# Patient Record
Sex: MALE | Race: ASIAN | NOT HISPANIC OR LATINO | URBAN - METROPOLITAN AREA
[De-identification: names, ages, dates, MRNs, and addresses within clinical notes are randomized per-mention and may not be internally consistent; named-entity substitution may affect disease eponyms.]

---

## 2021-05-25 ENCOUNTER — INPATIENT (INPATIENT)
Facility: HOSPITAL | Age: 71
LOS: 8 days | Discharge: NURSING, REHAB OR LONG-TERM CARE | DRG: 65 | End: 2021-06-03
Attending: HOSPITALIST | Admitting: FAMILY MEDICINE
Payer: MEDICARE

## 2021-05-25 VITALS
WEIGHT: 134.04 LBS | DIASTOLIC BLOOD PRESSURE: 97 MMHG | RESPIRATION RATE: 16 BRPM | TEMPERATURE: 98 F | OXYGEN SATURATION: 99 % | HEART RATE: 71 BPM | SYSTOLIC BLOOD PRESSURE: 185 MMHG

## 2021-05-25 DIAGNOSIS — Z29.9 ENCOUNTER FOR PROPHYLACTIC MEASURES, UNSPECIFIED: ICD-10-CM

## 2021-05-25 DIAGNOSIS — F17.210 NICOTINE DEPENDENCE, CIGARETTES, UNCOMPLICATED: ICD-10-CM

## 2021-05-25 DIAGNOSIS — I63.9 CEREBRAL INFARCTION, UNSPECIFIED: ICD-10-CM

## 2021-05-25 DIAGNOSIS — Z87.09 PERSONAL HISTORY OF OTHER DISEASES OF THE RESPIRATORY SYSTEM: ICD-10-CM

## 2021-05-25 DIAGNOSIS — R73.03 PREDIABETES: ICD-10-CM

## 2021-05-25 LAB
ALBUMIN SERPL ELPH-MCNC: 3.9 G/DL — SIGNIFICANT CHANGE UP (ref 3.3–5)
ALP SERPL-CCNC: 76 U/L — SIGNIFICANT CHANGE UP (ref 40–120)
ALT FLD-CCNC: 27 U/L — SIGNIFICANT CHANGE UP (ref 12–78)
ANION GAP SERPL CALC-SCNC: 7 MMOL/L — SIGNIFICANT CHANGE UP (ref 5–17)
APTT BLD: 29.9 SEC — SIGNIFICANT CHANGE UP (ref 27.5–35.5)
AST SERPL-CCNC: 20 U/L — SIGNIFICANT CHANGE UP (ref 15–37)
BASOPHILS # BLD AUTO: 0.04 K/UL — SIGNIFICANT CHANGE UP (ref 0–0.2)
BASOPHILS NFR BLD AUTO: 0.5 % — SIGNIFICANT CHANGE UP (ref 0–2)
BILIRUB SERPL-MCNC: 0.3 MG/DL — SIGNIFICANT CHANGE UP (ref 0.2–1.2)
BLD GP AB SCN SERPL QL: SIGNIFICANT CHANGE UP
BUN SERPL-MCNC: 12 MG/DL — SIGNIFICANT CHANGE UP (ref 7–23)
CALCIUM SERPL-MCNC: 8.5 MG/DL — SIGNIFICANT CHANGE UP (ref 8.5–10.1)
CHLORIDE SERPL-SCNC: 110 MMOL/L — HIGH (ref 96–108)
CK MB CFR SERPL CALC: <1 NG/ML — SIGNIFICANT CHANGE UP (ref 0–3.6)
CO2 SERPL-SCNC: 25 MMOL/L — SIGNIFICANT CHANGE UP (ref 22–31)
CREAT SERPL-MCNC: 0.83 MG/DL — SIGNIFICANT CHANGE UP (ref 0.5–1.3)
EOSINOPHIL # BLD AUTO: 0.16 K/UL — SIGNIFICANT CHANGE UP (ref 0–0.5)
EOSINOPHIL NFR BLD AUTO: 2 % — SIGNIFICANT CHANGE UP (ref 0–6)
GLUCOSE SERPL-MCNC: 100 MG/DL — HIGH (ref 70–99)
HCT VFR BLD CALC: 48.3 % — SIGNIFICANT CHANGE UP (ref 39–50)
HGB BLD-MCNC: 15.6 G/DL — SIGNIFICANT CHANGE UP (ref 13–17)
IMM GRANULOCYTES NFR BLD AUTO: 0.5 % — SIGNIFICANT CHANGE UP (ref 0–1.5)
INR BLD: 0.88 RATIO — SIGNIFICANT CHANGE UP (ref 0.88–1.16)
LYMPHOCYTES # BLD AUTO: 3.11 K/UL — SIGNIFICANT CHANGE UP (ref 1–3.3)
LYMPHOCYTES # BLD AUTO: 37.9 % — SIGNIFICANT CHANGE UP (ref 13–44)
MCHC RBC-ENTMCNC: 27.1 PG — SIGNIFICANT CHANGE UP (ref 27–34)
MCHC RBC-ENTMCNC: 32.3 GM/DL — SIGNIFICANT CHANGE UP (ref 32–36)
MCV RBC AUTO: 84 FL — SIGNIFICANT CHANGE UP (ref 80–100)
MONOCYTES # BLD AUTO: 1.09 K/UL — HIGH (ref 0–0.9)
MONOCYTES NFR BLD AUTO: 13.3 % — SIGNIFICANT CHANGE UP (ref 2–14)
NEUTROPHILS # BLD AUTO: 3.76 K/UL — SIGNIFICANT CHANGE UP (ref 1.8–7.4)
NEUTROPHILS NFR BLD AUTO: 45.8 % — SIGNIFICANT CHANGE UP (ref 43–77)
NRBC # BLD: 0 /100 WBCS — SIGNIFICANT CHANGE UP (ref 0–0)
PLATELET # BLD AUTO: 346 K/UL — SIGNIFICANT CHANGE UP (ref 150–400)
POTASSIUM SERPL-MCNC: 3.7 MMOL/L — SIGNIFICANT CHANGE UP (ref 3.5–5.3)
POTASSIUM SERPL-SCNC: 3.7 MMOL/L — SIGNIFICANT CHANGE UP (ref 3.5–5.3)
PROT SERPL-MCNC: 7.5 G/DL — SIGNIFICANT CHANGE UP (ref 6–8.3)
PROTHROM AB SERPL-ACNC: 10.4 SEC — LOW (ref 10.6–13.6)
RBC # BLD: 5.75 M/UL — SIGNIFICANT CHANGE UP (ref 4.2–5.8)
RBC # FLD: 13.8 % — SIGNIFICANT CHANGE UP (ref 10.3–14.5)
SARS-COV-2 RNA SPEC QL NAA+PROBE: SIGNIFICANT CHANGE UP
SODIUM SERPL-SCNC: 142 MMOL/L — SIGNIFICANT CHANGE UP (ref 135–145)
TROPONIN I SERPL-MCNC: <.015 NG/ML — SIGNIFICANT CHANGE UP (ref 0.01–0.04)
WBC # BLD: 8.2 K/UL — SIGNIFICANT CHANGE UP (ref 3.8–10.5)
WBC # FLD AUTO: 8.2 K/UL — SIGNIFICANT CHANGE UP (ref 3.8–10.5)

## 2021-05-25 PROCEDURE — 99223 1ST HOSP IP/OBS HIGH 75: CPT | Mod: GC

## 2021-05-25 PROCEDURE — 71045 X-RAY EXAM CHEST 1 VIEW: CPT | Mod: 26

## 2021-05-25 PROCEDURE — 70450 CT HEAD/BRAIN W/O DYE: CPT | Mod: 26,MA,59

## 2021-05-25 PROCEDURE — 70496 CT ANGIOGRAPHY HEAD: CPT | Mod: 26,MA

## 2021-05-25 PROCEDURE — 99285 EMERGENCY DEPT VISIT HI MDM: CPT

## 2021-05-25 PROCEDURE — 70498 CT ANGIOGRAPHY NECK: CPT | Mod: 26,MA

## 2021-05-25 PROCEDURE — 0042T: CPT

## 2021-05-25 RX ORDER — SODIUM CHLORIDE 9 MG/ML
1000 INJECTION, SOLUTION INTRAVENOUS
Refills: 0 | Status: DISCONTINUED | OUTPATIENT
Start: 2021-05-25 | End: 2021-05-25

## 2021-05-25 RX ORDER — ENOXAPARIN SODIUM 100 MG/ML
40 INJECTION SUBCUTANEOUS DAILY
Refills: 0 | Status: DISCONTINUED | OUTPATIENT
Start: 2021-05-25 | End: 2021-06-03

## 2021-05-25 RX ORDER — ASPIRIN/CALCIUM CARB/MAGNESIUM 324 MG
325 TABLET ORAL ONCE
Refills: 0 | Status: COMPLETED | OUTPATIENT
Start: 2021-05-25 | End: 2021-05-25

## 2021-05-25 RX ORDER — ALBUTEROL 90 UG/1
2 AEROSOL, METERED ORAL EVERY 6 HOURS
Refills: 0 | Status: DISCONTINUED | OUTPATIENT
Start: 2021-05-25 | End: 2021-06-03

## 2021-05-25 RX ORDER — NICOTINE POLACRILEX 2 MG
1 GUM BUCCAL DAILY
Refills: 0 | Status: DISCONTINUED | OUTPATIENT
Start: 2021-05-25 | End: 2021-06-03

## 2021-05-25 RX ORDER — ASPIRIN/CALCIUM CARB/MAGNESIUM 324 MG
300 TABLET ORAL DAILY
Refills: 0 | Status: DISCONTINUED | OUTPATIENT
Start: 2021-05-26 | End: 2021-05-27

## 2021-05-25 RX ORDER — ATORVASTATIN CALCIUM 80 MG/1
80 TABLET, FILM COATED ORAL AT BEDTIME
Refills: 0 | Status: DISCONTINUED | OUTPATIENT
Start: 2021-05-25 | End: 2021-06-03

## 2021-05-25 RX ORDER — ATORVASTATIN CALCIUM 80 MG/1
80 TABLET, FILM COATED ORAL AT BEDTIME
Refills: 0 | Status: DISCONTINUED | OUTPATIENT
Start: 2021-05-25 | End: 2021-05-25

## 2021-05-25 RX ORDER — ASPIRIN/CALCIUM CARB/MAGNESIUM 324 MG
300 TABLET ORAL DAILY
Refills: 0 | Status: DISCONTINUED | OUTPATIENT
Start: 2021-05-25 | End: 2021-05-25

## 2021-05-25 RX ADMIN — ATORVASTATIN CALCIUM 80 MILLIGRAM(S): 80 TABLET, FILM COATED ORAL at 21:45

## 2021-05-25 RX ADMIN — Medication 1 PATCH: at 20:35

## 2021-05-25 RX ADMIN — Medication 325 MILLIGRAM(S): at 18:23

## 2021-05-25 RX ADMIN — ENOXAPARIN SODIUM 40 MILLIGRAM(S): 100 INJECTION SUBCUTANEOUS at 20:35

## 2021-05-25 RX ADMIN — SODIUM CHLORIDE 70 MILLILITER(S): 9 INJECTION, SOLUTION INTRAVENOUS at 19:42

## 2021-05-25 NOTE — H&P ADULT - HISTORY OF PRESENT ILLNESS
72 yo M pmhx borderline diabetes, current smoker presenting with left arm and leg weakness that started 11:45 AM when patient was on a boat fishing. He was alone and rowed back to his car. When he got out of the boat, he started to become dizzy, as if he was going to pass out and found that he had difficulty ambulating due to extreme weakness of the left side of his body. The weakness remained until he reached home around 12:00. He laid down in bed and about 30 minutes later, he began to experience the weakness again. He called his PCP, Dr. Mills in [place] who told him he might be having a mini stroke and to go to the ER. He then told his son, John who brought him to the ER. He admits to continued weakness on the left side of his body, improved from before and dizziness upon standing. He also admitted to some difficulty swallowing when trying to take asa 325 in the ED.     Denies facial dropping, numbness/tingling, fall, trauma, loss of consciousness, chest pain, palpitations, shortness of breath.     ED Course:   178/88 HR 79 RR 16 SpO2 96%   Labs: insignificant   Imaging: CT Head/Stoke Protocol: HEAD CT: Mild volume loss, microvascular disease, no acute hemorrhage or midline shift.    CT PERFUSION demonstrated: No core infarct. No active ischemia.  If symptoms persist consider follow up head CT or MRI, MRA  if no contraindication.  CTA COW:  Patent intracranial circulation without flow limiting stenosis.  CTA NECK: Patent, ECAs, ICAs, no  hemodynamically significant stenosis at  ICA origins by NASCET criteria.  Bilateral vertebral arteries are patent without flow limiting stenosis.    Given  X 1  72 yo M pmhx borderline diabetes, current smoker, COPD presenting with left arm and leg weakness that started 11:45 AM when patient was on a boat fishing. He was alone and rowed back to his car. When he got out of the boat, he started to become dizzy, as if he was going to pass out and found that he had difficulty ambulating due to extreme weakness of the left side of his body. The weakness remained until he reached home around 12:00. He laid down in bed and about 30 minutes later, he began to experience the weakness again. He called his PCP, Dr. Mills in [place] who told him he might be having a mini stroke and to go to the ER. He then told his son, John who brought him to the ER. He admits to continued weakness on the left side of his body, improved from before and dizziness upon standing. He also admitted to some difficulty swallowing when trying to take asa 325 in the ED.     Denies facial dropping, numbness/tingling, fall, trauma, loss of consciousness, chest pain, palpitations, shortness of breath.     ED Course:   178/88 HR 79 RR 16 SpO2 96%   Labs: insignificant   Imaging: CT Head/Stoke Protocol: HEAD CT: Mild volume loss, microvascular disease, no acute hemorrhage or midline shift.    CT PERFUSION demonstrated: No core infarct. No active ischemia.  If symptoms persist consider follow up head CT or MRI, MRA  if no contraindication.  CTA COW:  Patent intracranial circulation without flow limiting stenosis.  CTA NECK: Patent, ECAs, ICAs, no  hemodynamically significant stenosis at  ICA origins by NASCET criteria.  Bilateral vertebral arteries are patent without flow limiting stenosis.    Given  X 1  70 yo M pmhx borderline diabetes, current smoker, COPD presenting with left arm and leg weakness that started 11:45 AM when patient was on a boat fishing. He was alone and rowed back to his car. When he got out of the boat, he started to become dizzy, as if he was going to pass out and found that he had difficulty ambulating due to extreme weakness of the left side of his body. The weakness remained until he reached home around 12:00. He laid down in bed and about 30 minutes later, he began to experience the weakness again. He called his PCP, Dr. Mills in [place] who told him he might be having a mini stroke and to go to the ER. He then told his son, John who brought him to the ER. He admits to continued weakness on the left side of his body, improved from before and dizziness upon standing. He also admitted to some difficulty swallowing when trying to take asa 325 in the ED.     Admits to similar episode occurring a few years ago in which he was evaluated in the ER for stroke. He was told he had an imbalance in the ears and given medication for 3 days. Symptoms resolved after the medications.     Denies facial dropping, numbness/tingling, fall, trauma, loss of consciousness, chest pain, palpitations, shortness of breath.     ED Course:   178/88 HR 79 RR 16 SpO2 96%   Labs: insignificant   Imaging: CT Head/Stoke Protocol: HEAD CT: Mild volume loss, microvascular disease, no acute hemorrhage or midline shift.    CT PERFUSION demonstrated: No core infarct. No active ischemia.  If symptoms persist consider follow up head CT or MRI, MRA  if no contraindication.  CTA COW:  Patent intracranial circulation without flow limiting stenosis.  CTA NECK: Patent, ECAs, ICAs, no  hemodynamically significant stenosis at  ICA origins by NASCET criteria.  Bilateral vertebral arteries are patent without flow limiting stenosis.    Given  X 1  70 yo M pmhx borderline diabetes, current smoker, COPD presenting with left arm and leg weakness that started 11:45 AM when patient was on a boat fishing. He was alone and rowed back to his car. When he got out of the boat, he started to become dizzy, as if he was going to pass out and found that he had difficulty ambulating due to extreme weakness of the left side of his body. The weakness remained until he reached home around 12:00. He laid down in bed and about 30 minutes later, he began to experience the weakness again. He called his PCP, Dr. Mills in [place] who told him he might be having a mini stroke and to go to the ER. He then told his son, John who brought him to the ER. He admits to continued weakness on the left side of his body, improved from before and dizziness upon standing. He also admitted to some difficulty swallowing when trying to take asa 325 in the ED.     Admits to similar episode occurring a few years ago in which he was evaluated in the ER for stroke. He was told he had an imbalance in the ears and given medication for 3 days. Symptoms resolved after the medications.     Denies facial dropping, numbness/tingling, fall, trauma, loss of consciousness, chest pain, palpitations, shortness of breath.     ED Course:   178/88 HR 79 RR 16 SpO2 96%   Labs: CK MB negative, troponin negative   Imaging: CT Head/Stoke Protocol: HEAD CT: Mild volume loss, microvascular disease, no acute hemorrhage or midline shift.    CT PERFUSION demonstrated: No core infarct. No active ischemia.  If symptoms persist consider follow up head CT or MRI, MRA  if no contraindication.  CTA COW:  Patent intracranial circulation without flow limiting stenosis.  CTA NECK: Patent, ECAs, ICAs, no  hemodynamically significant stenosis at  ICA origins by NASCET criteria.  Bilateral vertebral arteries are patent without flow limiting stenosis.    Given  X 1  70 yo M pmhx borderline diabetes, current smoker, COPD presenting with left arm and leg weakness that started 11:45 AM when patient was on a boat fishing. He was alone and was able to row back to his car. When he got out of the boat, he started to become dizzy, as if he was going to pass out and found that he had difficulty ambulating due to extreme weakness of the left side of his body. The weakness remained until he reached home around 12:00. He laid down in bed and about 30 minutes later, he began to experience the weakness again. He called his PCP, Dr. Mills in [place] who told him he might be having a mini stroke and to go to the ER. He then told his son, John who brought him to the ER. He admits to continued weakness on the left side of his body, improved from before and dizziness upon standing. He also admitted to some difficulty swallowing when trying to take asa 325 in the ED.     Admits to similar episode occurring a few years ago in which he was evaluated in the ER for stroke. He was told he had an imbalance in the ears and given medication for 3 days. Symptoms resolved after the medications.     Denies facial dropping, numbness/tingling, fall, trauma, loss of consciousness, chest pain, palpitations, shortness of breath.     ED Course:   178/88 HR 79 RR 16 SpO2 96%   Labs: CK MB negative, troponin negative   Imaging: CT Head/Stoke Protocol: HEAD CT: Mild volume loss, microvascular disease, no acute hemorrhage or midline shift.  CT PERFUSION demonstrated: No core infarct. No active ischemia.  If symptoms persist consider follow up head CT or MRI, MRA  if no contraindication.  CTA COW:  Patent intracranial circulation without flow limiting stenosis.  CTA NECK: Patent, ECAs, ICAs, no  hemodynamically significant stenosis at  ICA origins by NASCET criteria.  Bilateral vertebral arteries are patent without flow limiting stenosis.    Given  X 1  72 yo M pmhx borderline diabetes, current smoker, COPD presenting with left arm and leg weakness that started 11:45 AM when patient was on a boat fishing. He was alone and was able to row back to his car. When he got out of the boat, he started to become dizzy, as if he was going to pass out and found that he had difficulty ambulating due to extreme weakness of the left side of his body. The weakness remained until he reached home around 12:00. He laid down in bed and about 30 minutes later, he began to experience the weakness again. He called his PCP, Dr. Mills in [place] who told him he might be having a mini stroke and to go to the ER. He then told his son, John who brought him to the ER. He admits to continued weakness on the left side of his body, improved from before and dizziness upon standing. He also admitted to some difficulty swallowing when trying to take asa 325 in the ED.     Admits to similar episode occurring a few years ago in which he was evaluated in the ER for stroke. He was told he had an imbalance in the ears and given medication for 3 days. Symptoms resolved after the medications.     Denies facial dropping, numbness/tingling, fall, trauma, loss of consciousness, chest pain, palpitations, shortness of breath.     ED Course:   178/88 HR 79 RR 16 SpO2 96%   EKG NSR no ST or T wave changes   Labs: CK MB negative, troponin negative   Imaging: CT Head/Stoke Protocol: HEAD CT: Mild volume loss, microvascular disease, no acute hemorrhage or midline shift.  CT PERFUSION demonstrated: No core infarct. No active ischemia.  If symptoms persist consider follow up head CT or MRI, MRA  if no contraindication.  CTA COW:  Patent intracranial circulation without flow limiting stenosis.  CTA NECK: Patent, ECAs, ICAs, no  hemodynamically significant stenosis at  ICA origins by NASCET criteria.  Bilateral vertebral arteries are patent without flow limiting stenosis.        Given  X 1

## 2021-05-25 NOTE — H&P ADULT - NSHPREVIEWOFSYSTEMS_GEN_ALL_CORE
Constitutional: denies fever, chills, sweating  HEENT: admits to lightheadedness, dizziness   Respiratory: denies SOB, cough, or wheezing  Cardiovascular: denies CP, palpitations  Gastrointestinal: denies nausea, vomiting, diarrhea, constipation, abdominal pain, or bloody stools  Genitourinary: denies painful urination, increased frequency, urgency, or bloody urine  Skin/Breast: denies rashes or itching  Musculoskeletal: denies muscle aches, joint swelling, or muscle weakness  Neurologic: denies loss of sensation, numbness, or tingling  ROS negative except as noted above

## 2021-05-25 NOTE — H&P ADULT - NSHPOUTPATIENTPROVIDERS_GEN_ALL_CORE
Josiane Mills (PCP - [place])   COVID Moderna 4/2021 Josiane Mills (PCP - [place])   COVID Moderna x 2; 4/2021

## 2021-05-25 NOTE — H&P ADULT - PROBLEM SELECTOR PLAN 5
Lovenox 40 mg subQ daily  IMPROVE VTE Individual Risk Assessment          RISK                                                          Points  [  ] Previous VTE                                                3  [  ] Thrombophilia                                             2  [  ] Lower limb paralysis                                   2        (unable to hold up >15 seconds)    [  ] Current Cancer                                             2         (within 6 months)  [  ] Immobilization > 24 hrs                              1  [  ] ICU/CCU stay > 24 hours                             1  [ 1 ] Age > 60                                                         1    IMPROVE VTE Score:         [     1    ]

## 2021-05-25 NOTE — H&P ADULT - PROBLEM SELECTOR PLAN 3
Chantix held due to strict NPO status, may continue once passes bedside screen  - start nicotine patch 14 transdermal daily

## 2021-05-25 NOTE — H&P ADULT - NSHPSOCIALHISTORY_GEN_ALL_CORE
Smokin/2 pack/day since teens; 25 pack year history  ETOH: denies  ADLS: independently  Lives with son  CODE: Full

## 2021-05-25 NOTE — H&P ADULT - ATTENDING COMMENTS
70 yo M pmhx borderline diabetes, current smoker, COPD presenting with left sided weakness and dizziness admitted for CVA. Plan: apprec neuro recs, monitor neuro course, neuro check, s/s eval apprec, monitor clinical course, smoking cessation discussed with admitting team approx 8 min, nicotine patch, pt eval apprec

## 2021-05-25 NOTE — ED PROVIDER NOTE - OBJECTIVE STATEMENT
71 m no PMH here with left leg and arm weakness. started between 11 and 12 this morning while he was fishing. He reports he feels his strength is better now. No other symptoms. no dizziness, headache. He does not take any medications.

## 2021-05-25 NOTE — ED PROVIDER NOTE - NS ED ROS FT
Constitutional: No fever.  Neurological: No headache. + weakness.  Eyes: No vision changes.   Ears, Nose, Mouth, Throat: No congestion.  Cardiovascular: No chest pain.  Respiratory: No difficulty breathing.  Gastrointestinal: No nausea or vomiting.  Genitourinary: No dysuria.  Musculoskeletal: No joint pain.  Integumentary (skin and/or breast): No rash.

## 2021-05-25 NOTE — H&P ADULT - PROBLEM SELECTOR PLAN 1
Patient presenting with left sided weakness, dizziness likely 2/2 to neurological event, CVA   CT Brain Stroke Protocol showing no acute ischemic events, no stenosis   - NIHSS 2, residual weakness left side  - MRI head noncon for AM   - Neuro checks q4  - Strict NPO due to dysphagia pending bedside dysphagia screen; Speech and Swallow Consult  - ASA suppository daily  - PT Evaluation  - Lipid panel/HA1C for AM  - Admit to tele   - Fall Risk precautions, Aspiration Precautions  - NeuroMolly consulted appreciate recs Patient presenting with left sided weakness, dizziness likely 2/2 to neurological event, CVA   CT Brain Stroke Protocol showing no acute ischemic events, no stenosis   - NIHSS 2, residual weakness left side  - MRI head noncon for AM   - Neuro checks q4  - Strict NPO due to dysphagia pending bedside dysphagia screen; Speech and Swallow Consult  - ASA suppository daily; lipitor held due to strict NPO status may start lipitor 80 mg QHS   - PT Evaluation  - Lipid panel/HA1C for AM  - Admit to tele   - Fall Risk precautions, Aspiration Precautions  - NeuroMolly consulted appreciate recs Patient presenting with left sided weakness, dizziness likely 2/2 to neurological event, CVA   CT Brain Stroke Protocol showing no acute ischemic events, no stenosis   - NIHSS 2, residual weakness left side  - MRI head noncon for AM   - Neuro checks q4  - Strict NPO due to dysphagia pending bedside dysphagia screen; Speech and Swallow Consult  - ASA suppository daily; lipitor held due to strict NPO status may start lipitor 80 mg QHS once passes dysphagia screen  - PT Evaluation  - Lipid panel/HA1C for AM  - Admit to tele   - Fall Risk precautions, Aspiration Precautions  - Neuro, Molly consulted appreciate recs

## 2021-05-25 NOTE — ED PROVIDER NOTE - PHYSICAL EXAMINATION
Vital signs as available reviewed.  General:  Comfortable, no acute distress.  Head:  Normocephalic, atraumatic.  Eyes:  Conjunctiva pink, no icterus.  Cardiovascular:  Regular rate, no obvious murmur.  Respiratory:  Clear to auscultation, good air entry bilaterally.  Abdomen:  Soft, non-tender.  Musculoskeletal:  No deformity or calf tenderness.  Neurologic: See stroke tab.  Skin:  Warm and dry.

## 2021-05-25 NOTE — ED ADULT NURSE REASSESSMENT NOTE - NS ED NURSE REASSESS COMMENT FT1
Assumed care of pt from previous RN, Pt ambulated to bathroom with steady gait, resting comfortably on stretcher, IVF infusing, awaiting admission bed, respirations even and unlabored, will continue to monitor closely.

## 2021-05-25 NOTE — ED ADULT NURSE REASSESSMENT NOTE - NS ED NURSE REASSESS COMMENT FT1
Upon completion of CT scan, pt's symptoms improved immensely.  Pt now able to lift and hold left leg and has very minimal drift to left arm.

## 2021-05-25 NOTE — H&P ADULT - ASSESSMENT
72 yo M pmhx borderline diabetes, current smoker, COPD presenting with left sided weakness and dizziness admitted for CVA.

## 2021-05-25 NOTE — H&P ADULT - NSHPPHYSICALEXAM_GEN_ALL_CORE
LOS:     VITALS:   T(C): 36.6 (05-25-21 @ 15:09), Max: 36.6 (05-25-21 @ 15:09)  HR: 70 (05-25-21 @ 18:20) (70 - 79)  BP: 153/75 (05-25-21 @ 18:20) (153/75 - 185/97)  RR: 16 (05-25-21 @ 18:20) (16 - 16)  SpO2: 98% (05-25-21 @ 18:20) (96% - 99%)    GENERAL: NAD, lying in bed comfortably  HEAD:  Atraumatic, Normocephalic  EYES: EOMI, PERRLA, conjunctiva and sclera clear  ENT: Moist mucous membranes  NECK: Supple, No JVD  CHEST/LUNG: Clear to auscultation bilaterally; No rales, rhonchi, wheezing, or rubs. Unlabored respirations  HEART: Regular rate and rhythm; No murmurs, rubs, or gallops  ABDOMEN: BSx4; Soft, nontender, nondistended  EXTREMITIES:  2+ Peripheral Pulses, brisk capillary refill. No clubbing, cyanosis, or edema  NERVOUS SYSTEM:  A&Ox3, + 3/5 strength left upper extremity and left lower extremity, sensation intact, VITALS:   T(C): 36.6 (05-25-21 @ 15:09), Max: 36.6 (05-25-21 @ 15:09)  HR: 70 (05-25-21 @ 18:20) (70 - 79)  BP: 153/75 (05-25-21 @ 18:20) (153/75 - 185/97)  RR: 16 (05-25-21 @ 18:20) (16 - 16)  SpO2: 98% (05-25-21 @ 18:20) (96% - 99%)    GENERAL: NAD, lying in bed comfortably  HEAD:  Atraumatic, Normocephalic  EYES: EOMI, PERRLA, conjunctiva and sclera clear  ENT: Moist mucous membranes  NECK: Supple, No JVD  CHEST/LUNG: Clear to auscultation bilaterally; No rales, rhonchi, wheezing, or rubs. Unlabored respirations  HEART: Regular rate and rhythm; No murmurs, rubs, or gallops  ABDOMEN: BSx4; Soft, nontender, nondistended  EXTREMITIES:  2+ Peripheral Pulses, brisk capillary refill. No clubbing, cyanosis, or edema  NERVOUS SYSTEM:  A&Ox3, + 3/5 strength left upper extremity and left lower extremity, sensation intact,

## 2021-05-25 NOTE — H&P ADULT - NSICDXFAMILYHX_GEN_ALL_CORE_FT
FAMILY HISTORY:  Mother  Still living? No  Family history of cerebrovascular accident (CVA) in mother, Age at diagnosis: Age Unknown    Sibling  Still living? Unknown  Family hx of lung cancer, Age at diagnosis: Age Unknown  FHx: colon cancer, Age at diagnosis: Age Unknown

## 2021-05-25 NOTE — ED ADULT NURSE NOTE - OBJECTIVE STATEMENT
Pt A&Ox4, ambulatory to ED c/o left side weakness.  Pt states he woke up "fine" but then was working outside and had difficulty walking and weakness to his right arm.  Pt states similar symptoms have happened in the past and resolved.  Pt presents with drift to left arm and leg, hand grasp equal.

## 2021-05-25 NOTE — H&P ADULT - NSICDXPASTMEDICALHX_GEN_ALL_CORE_FT
PAST MEDICAL HISTORY:  Borderline diabetic     Chronic obstructive pulmonary disease, unspecified COPD type     Current smoker

## 2021-05-26 LAB
A1C WITH ESTIMATED AVERAGE GLUCOSE RESULT: 6.3 % — HIGH (ref 4–5.6)
A1C WITH ESTIMATED AVERAGE GLUCOSE RESULT: 6.4 % — HIGH (ref 4–5.6)
ALBUMIN SERPL ELPH-MCNC: 3.7 G/DL — SIGNIFICANT CHANGE UP (ref 3.3–5)
ALP SERPL-CCNC: 76 U/L — SIGNIFICANT CHANGE UP (ref 40–120)
ALT FLD-CCNC: 19 U/L — SIGNIFICANT CHANGE UP (ref 12–78)
ANION GAP SERPL CALC-SCNC: 8 MMOL/L — SIGNIFICANT CHANGE UP (ref 5–17)
AST SERPL-CCNC: 25 U/L — SIGNIFICANT CHANGE UP (ref 15–37)
BASOPHILS # BLD AUTO: 0.05 K/UL — SIGNIFICANT CHANGE UP (ref 0–0.2)
BASOPHILS NFR BLD AUTO: 0.5 % — SIGNIFICANT CHANGE UP (ref 0–2)
BILIRUB SERPL-MCNC: 0.5 MG/DL — SIGNIFICANT CHANGE UP (ref 0.2–1.2)
BUN SERPL-MCNC: 8 MG/DL — SIGNIFICANT CHANGE UP (ref 7–23)
CALCIUM SERPL-MCNC: 8.6 MG/DL — SIGNIFICANT CHANGE UP (ref 8.5–10.1)
CHLORIDE SERPL-SCNC: 109 MMOL/L — HIGH (ref 96–108)
CHOLEST SERPL-MCNC: 166 MG/DL — SIGNIFICANT CHANGE UP
CO2 SERPL-SCNC: 25 MMOL/L — SIGNIFICANT CHANGE UP (ref 22–31)
COVID-19 SPIKE DOMAIN AB INTERP: POSITIVE
COVID-19 SPIKE DOMAIN ANTIBODY RESULT: >250 U/ML — HIGH
CREAT SERPL-MCNC: 0.68 MG/DL — SIGNIFICANT CHANGE UP (ref 0.5–1.3)
EOSINOPHIL # BLD AUTO: 0.08 K/UL — SIGNIFICANT CHANGE UP (ref 0–0.5)
EOSINOPHIL NFR BLD AUTO: 0.8 % — SIGNIFICANT CHANGE UP (ref 0–6)
ESTIMATED AVERAGE GLUCOSE: 134 MG/DL — HIGH (ref 68–114)
ESTIMATED AVERAGE GLUCOSE: 137 MG/DL — HIGH (ref 68–114)
GLUCOSE SERPL-MCNC: 102 MG/DL — HIGH (ref 70–99)
HCT VFR BLD CALC: 46.7 % — SIGNIFICANT CHANGE UP (ref 39–50)
HCV AB S/CO SERPL IA: 0.11 S/CO — SIGNIFICANT CHANGE UP (ref 0–0.99)
HCV AB SERPL-IMP: SIGNIFICANT CHANGE UP
HDLC SERPL-MCNC: 50 MG/DL — SIGNIFICANT CHANGE UP
HGB BLD-MCNC: 15.3 G/DL — SIGNIFICANT CHANGE UP (ref 13–17)
IMM GRANULOCYTES NFR BLD AUTO: 0.6 % — SIGNIFICANT CHANGE UP (ref 0–1.5)
LIPID PNL WITH DIRECT LDL SERPL: 87 MG/DL — SIGNIFICANT CHANGE UP
LYMPHOCYTES # BLD AUTO: 2.21 K/UL — SIGNIFICANT CHANGE UP (ref 1–3.3)
LYMPHOCYTES # BLD AUTO: 23 % — SIGNIFICANT CHANGE UP (ref 13–44)
MAGNESIUM SERPL-MCNC: 2.3 MG/DL — SIGNIFICANT CHANGE UP (ref 1.6–2.6)
MCHC RBC-ENTMCNC: 27.5 PG — SIGNIFICANT CHANGE UP (ref 27–34)
MCHC RBC-ENTMCNC: 32.8 GM/DL — SIGNIFICANT CHANGE UP (ref 32–36)
MCV RBC AUTO: 83.8 FL — SIGNIFICANT CHANGE UP (ref 80–100)
MONOCYTES # BLD AUTO: 1.05 K/UL — HIGH (ref 0–0.9)
MONOCYTES NFR BLD AUTO: 10.9 % — SIGNIFICANT CHANGE UP (ref 2–14)
NEUTROPHILS # BLD AUTO: 6.15 K/UL — SIGNIFICANT CHANGE UP (ref 1.8–7.4)
NEUTROPHILS NFR BLD AUTO: 64.2 % — SIGNIFICANT CHANGE UP (ref 43–77)
NON HDL CHOLESTEROL: 117 MG/DL — SIGNIFICANT CHANGE UP
NRBC # BLD: 0 /100 WBCS — SIGNIFICANT CHANGE UP (ref 0–0)
PHOSPHATE SERPL-MCNC: 2.5 MG/DL — SIGNIFICANT CHANGE UP (ref 2.5–4.5)
PLATELET # BLD AUTO: 340 K/UL — SIGNIFICANT CHANGE UP (ref 150–400)
POTASSIUM SERPL-MCNC: 3.9 MMOL/L — SIGNIFICANT CHANGE UP (ref 3.5–5.3)
POTASSIUM SERPL-SCNC: 3.9 MMOL/L — SIGNIFICANT CHANGE UP (ref 3.5–5.3)
PROT SERPL-MCNC: 7.1 G/DL — SIGNIFICANT CHANGE UP (ref 6–8.3)
RBC # BLD: 5.57 M/UL — SIGNIFICANT CHANGE UP (ref 4.2–5.8)
RBC # FLD: 13.8 % — SIGNIFICANT CHANGE UP (ref 10.3–14.5)
SARS-COV-2 IGG+IGM SERPL QL IA: >250 U/ML — HIGH
SARS-COV-2 IGG+IGM SERPL QL IA: POSITIVE
SODIUM SERPL-SCNC: 142 MMOL/L — SIGNIFICANT CHANGE UP (ref 135–145)
TRIGL SERPL-MCNC: 149 MG/DL — SIGNIFICANT CHANGE UP
WBC # BLD: 9.6 K/UL — SIGNIFICANT CHANGE UP (ref 3.8–10.5)
WBC # FLD AUTO: 9.6 K/UL — SIGNIFICANT CHANGE UP (ref 3.8–10.5)

## 2021-05-26 PROCEDURE — 93010 ELECTROCARDIOGRAM REPORT: CPT

## 2021-05-26 PROCEDURE — 99233 SBSQ HOSP IP/OBS HIGH 50: CPT

## 2021-05-26 PROCEDURE — 70551 MRI BRAIN STEM W/O DYE: CPT | Mod: 26

## 2021-05-26 RX ORDER — SODIUM CHLORIDE 9 MG/ML
1000 INJECTION, SOLUTION INTRAVENOUS
Refills: 0 | Status: DISCONTINUED | OUTPATIENT
Start: 2021-05-26 | End: 2021-05-27

## 2021-05-26 RX ORDER — HYDROCORTISONE 1 %
1 OINTMENT (GRAM) TOPICAL
Qty: 0 | Refills: 0 | DISCHARGE

## 2021-05-26 RX ADMIN — Medication 1 PATCH: at 21:05

## 2021-05-26 RX ADMIN — Medication 1 PATCH: at 21:06

## 2021-05-26 RX ADMIN — SODIUM CHLORIDE 50 MILLILITER(S): 9 INJECTION, SOLUTION INTRAVENOUS at 12:27

## 2021-05-26 RX ADMIN — Medication 300 MILLIGRAM(S): at 15:27

## 2021-05-26 RX ADMIN — Medication 1 PATCH: at 15:26

## 2021-05-26 RX ADMIN — Medication 1 PATCH: at 07:25

## 2021-05-26 RX ADMIN — ENOXAPARIN SODIUM 40 MILLIGRAM(S): 100 INJECTION SUBCUTANEOUS at 12:27

## 2021-05-26 NOTE — SWALLOW BEDSIDE ASSESSMENT ADULT - SWALLOW EVAL: DIAGNOSIS
Pt self-administered PO trials of puree, honey thick liquids, and nectar thick liquids. Pt p/w a functional oral phase marked by adequate retrieval and containment, timely manipulation and transfer, and adequate clearance post swallow. Pharyngeal dysphagia marked by suspected delayed swallow onset, +laryngeal elevation to palpation. Pt demonstrated immediate reflexive cough response with honey thick liquids. Pt demonstrated wet vocal quality across all PO. Recommend that oral nutrition/hydration/medication is contraindicated, consider short-term non-oral means. Recommend MBS to determine PO candidacy and utilization of compensatory strategies. Discussed with MD.

## 2021-05-26 NOTE — PHYSICAL THERAPY INITIAL EVALUATION ADULT - PERTINENT HX OF CURRENT PROBLEM, REHAB EVAL
2 yo M pmhx borderline diabetes, current smoker, COPD presenting with left sided weakness and dizziness admitted for CVA.

## 2021-05-26 NOTE — PROGRESS NOTE ADULT - SUBJECTIVE AND OBJECTIVE BOX
Neurology follow up note    [facility] [id number]      Interval History:    Patient feels ok no new complaints.    MEDICATIONS    ALBUTerol    90 MICROgram(s) HFA Inhaler 2 Puff(s) Inhalation every 6 hours PRN  aspirin Suppository 300 milliGRAM(s) Rectal daily  atorvastatin 80 milliGRAM(s) Oral at bedtime  enoxaparin Injectable 40 milliGRAM(s) SubCutaneous daily  nicotine -  14 mG/24Hr(s) Patch 1 patch Transdermal daily  sodium chloride 0.45%. 1000 milliLiter(s) IV Continuous <Continuous>      Allergies    No Known Allergies    Intolerances        Height (cm): 162.6 (05-26 @ 01:09)  Weight (kg): 58.8 (05-26 @ 01:09)  BMI (kg/m2): 22.2 (05-26 @ 01:09)    Vital Signs Last 24 Hrs  T(C): 36.7 (26 May 2021 05:17), Max: 36.7 (26 May 2021 00:30)  T(F): 98.1 (26 May 2021 05:17), Max: 98.1 (26 May 2021 05:17)  HR: 86 (26 May 2021 05:17) (64 - 86)  BP: 148/71 (26 May 2021 05:17) (143/70 - 185/97)  BP(mean): --  RR: 18 (26 May 2021 05:17) (16 - 18)  SpO2: 95% (26 May 2021 05:17) (95% - 99%)      REVIEW OF SYSTEMS:  Constitutional:  The patient denies fever, chills, or night sweats.  Head:  No headaches.  Eyes:  No double vision or blurry vision.  Ears:  No ringing in the ears.  Neck:  No neck pain.  Cardiovascular:  No chest pain.  Respiratory:  No shortness of breath.  Abdomen:  No nausea, vomiting, or abdominal pain.  Extremities/Neurological:  No numbness or tingling.  Musculoskeletal:  No joint pain.      PHYSICAL EXAMINATION:    HEENT:  Head:  Normocephalic, atraumatic.  Eyes:  No scleral icterus.  Ears:  Hearing bilaterally intact.  NECK:  Supple..  CARDIOVASCULAR:  S1 and S2 heard.  RESPIRATORY:  Good air entry bilaterally.  ABDOMEN:  Soft, nontender.  EXTREMITIES:  No clubbing or cyanosis were noted.      NEUROLOGIC:  The patient is awake, alert,   Extraocular movements were intact.  Pupils were equal, round, and reactive bilaterally 3 mm to 2 mm.  Speech was fluent.  Smile was symmetric.  Motor:  Right upper and right lower were 5/5, left upper was able to elevate  bed would fall to bed.  Overall strength was 3/5 bi/triceps hand grasp.   Left lower extremity was 3+/5.  Sensory:  Bilateral upper and lower intact to light touch.            LABS:  CBC Full  -  ( 26 May 2021 07:38 )  WBC Count : 9.60 K/uL  RBC Count : 5.57 M/uL  Hemoglobin : 15.3 g/dL  Hematocrit : 46.7 %  Platelet Count - Automated : 340 K/uL  Mean Cell Volume : 83.8 fl  Mean Cell Hemoglobin : 27.5 pg  Mean Cell Hemoglobin Concentration : 32.8 gm/dL  Auto Neutrophil # : 6.15 K/uL  Auto Lymphocyte # : 2.21 K/uL  Auto Monocyte # : 1.05 K/uL  Auto Eosinophil # : 0.08 K/uL  Auto Basophil # : 0.05 K/uL  Auto Neutrophil % : 64.2 %  Auto Lymphocyte % : 23.0 %  Auto Monocyte % : 10.9 %  Auto Eosinophil % : 0.8 %  Auto Basophil % : 0.5 %      05-26    142  |  109<H>  |  8   ----------------------------<  102<H>  3.9   |  25  |  0.68    Ca    8.6      26 May 2021 07:38  Phos  2.5     05-26  Mg     2.3     05-26    TPro  7.1  /  Alb  3.7  /  TBili  0.5  /  DBili  x   /  AST  25  /  ALT  19  /  AlkPhos  76  05-26    Hemoglobin A1C:     LIVER FUNCTIONS - ( 26 May 2021 07:38 )  Alb: 3.7 g/dL / Pro: 7.1 g/dL / ALK PHOS: 76 U/L / ALT: 19 U/L / AST: 25 U/L / GGT: x           Vitamin B12   PT/INR - ( 25 May 2021 15:48 )   PT: 10.4 sec;   INR: 0.88 ratio         PTT - ( 25 May 2021 15:48 )  PTT:29.9 sec      RADIOLOGY    ANALYSIS AND PLAN:  This is a 71-year-old with left-sided hemiparesis.  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  What could be calculated from the NIH stroke scale would be 4.  The patient would not be a TPA candidate secondary to timeframe of symptoms, and at present etiology of cerebrovascular accident unknown.  Telemetry evaluation.  Echocardiogram.  We will plan for MRI imaging of the brain.  We would recommend to avoid hypotension, allow for permissive hypertensive.  Do not treat a systolic blood pressure unless it is above 200 or diastolic above 105.  We will start the patient on aspirin.  High-dose statin.  Physical therapy evaluation.  S/S evaluation if able to swallow asa 325     Son's name is John, his telephone number is [phone] yesterday today went to voicemail     Greater than 45 minutes of time was spent with the patient, plan of care, reviewing data, with greater than 50% of the visit was spent counseling and/or coordinating care with multidisciplinary healthcare team

## 2021-05-26 NOTE — SWALLOW BEDSIDE ASSESSMENT ADULT - COMMENTS
Pt received upright in bed, awake and cooperative, on room air. Pt was agreeable to assessment. Pt followed simple commands independently. Pt's vocal quality was hoarse, speech production was grossly WFL. Pt denied pain pre and post assessment.    MRI brain: "Small focus of restricted diffusion within the right posterior corona radiata measuring approximately 1.5 x 0.9 cm, compatible with an acute lacunar infarct. No associated mass effect or hemorrhage. Mild periventricular and subcortical white matter chronic microvascular ischemic changes."  CXR 5/25: "Hyperinflated lungs. No active infiltrates." Pt's WBC is WFL, no fever.

## 2021-05-26 NOTE — SWALLOW BEDSIDE ASSESSMENT ADULT - PHARYNGEAL PHASE
Delayed pharyngeal swallow/Wet vocal quality post oral intake Delayed pharyngeal swallow/Wet vocal quality post oral intake/Cough post oral intake

## 2021-05-26 NOTE — PROGRESS NOTE ADULT - SUBJECTIVE AND OBJECTIVE BOX
Saul Marion MD  [phone]    SUBJECTIVE:  Seen with Dr. Barnes.  Interviewed with Cantonese  #[id number].  Resting in bed.  Reports that leg weakness is resolved.  Still with left shoulder weakness.  No dizziness.  No numbness/tingling.  Denies any apparent aspiration of saliva.  No N/V.  No SOB.  NAD.    Tele: NSR, no events reported.    MEDICATIONS  (STANDING):  aspirin Suppository 300 milliGRAM(s) Rectal daily  atorvastatin 80 milliGRAM(s) Oral at bedtime  enoxaparin Injectable 40 milliGRAM(s) SubCutaneous daily  nicotine -  14 mG/24Hr(s) Patch 1 patch Transdermal daily    MEDICATIONS  (PRN):  ALBUTerol    90 MICROgram(s) HFA Inhaler 2 Puff(s) Inhalation every 6 hours PRN Shortness of Breath and/or Wheezing      Vital Signs Last 24 Hrs  T(C): 36.7 (26 May 2021 05:17), Max: 36.7 (26 May 2021 00:30)  T(F): 98.1 (26 May 2021 05:17), Max: 98.1 (26 May 2021 05:17)  HR: 86 (26 May 2021 05:17) (64 - 86)  BP: 148/71 (26 May 2021 05:17) (143/70 - 185/97)  BP(mean): --  RR: 18 (26 May 2021 05:17) (16 - 18)  SpO2: 95% (26 May 2021 05:17) (95% - 99%)    CAPILLARY BLOOD GLUCOSE      POCT Blood Glucose.: 95 mg/dL (25 May 2021 16:43)    I&O's Summary    25 May 2021 07:01  -  26 May 2021 07:00  --------------------------------------------------------  IN: 0 mL / OUT: 600 mL / NET: -600 mL        PHYSICAL EXAM:  GENERAL: Looks stated age, NAD  CARDIOVASCULAR: Normal S1, S2  PULMONARY: Lungs clear to auscultation bilaterally. No wheezes/rales/rhonchi  GI: Abdomen non-distended, soft, Nontender.  Bowel sounds present  MSK/Ext:  No leg edema.  No calf tenderness bilaterally  PSYCH: Normal Affect. AAOx3  NEUROLOGY: CN II-XII grossly intact.  UE and LE sensations grossly intact.  4/5 left hand  strength, 5/5 on the right.  5/5 LE strength b/l.  5/5 left elbow strength.  Left shoulder with 3/5 strength with abduction, 4/5 with adduction.     LABS:                        15.3   9.60  )-----------( 340      ( 26 May 2021 07:38 )             46.7     05-26    142  |  109<H>  |  8   ----------------------------<  102<H>  3.9   |  25  |  0.68    Ca    8.6      26 May 2021 07:38  Phos  2.5     05-26  Mg     2.3     05-26    TPro  7.1  /  Alb  3.7  /  TBili  0.5  /  DBili  x   /  AST  25  /  ALT  19  /  AlkPhos  76  05-26    PT/INR - ( 25 May 2021 15:48 )   PT: 10.4 sec;   INR: 0.88 ratio         PTT - ( 25 May 2021 15:48 )  PTT:29.9 sec  CARDIAC MARKERS ( 25 May 2021 15:48 )  <.015 ng/mL / x     / x     / x     / <1.0 ng/mL            RADIOLOGY & ADDITIONAL TESTS:

## 2021-05-26 NOTE — SWALLOW BEDSIDE ASSESSMENT ADULT - SLP PERTINENT HISTORY OF CURRENT PROBLEM
Per charting, "70 yo M pmhx borderline diabetes, current smoker, COPD presenting with left sided weakness and dizziness admitted for CVA."

## 2021-05-26 NOTE — PHYSICAL THERAPY INITIAL EVALUATION ADULT - ADDITIONAL COMMENTS
Pt reports living in a private house with his wife and son. Pt reports 1 [address], bedroom on ground floor. Pt was independent with ambulation and ADLs PTA. Pt drives. Pt has a tub and states he will be getting a shower chair and grab bars.

## 2021-05-26 NOTE — PROGRESS NOTE ADULT - PROBLEM SELECTOR PLAN 1
Patient presented with left-sided weakness, dizziness.  CT brain negative for CVA and CTA head/neck unremarkable.  Case d/w Dr. Barnes.  Suspect CVA, now with resolution of left LE weakness.  Plan for MRI today.   Neuro checks q4.  Case d/w Speech Pathologist, recommending to continue NPO, MBS in AM.  Will give IVF while NPO.  Continue ASA suppository.  Add Lipitor 80 mg QHS once able to take PO.  PT Evaluation.  A1C 6.3, Lipid panel pending.  Continue tele monitoring.   Fall Risk precautions.

## 2021-05-26 NOTE — PHYSICAL THERAPY INITIAL EVALUATION ADULT - RANGE OF MOTION EXAMINATION, REHAB EVAL
Right UE ROM was WNL (within normal limits)/Right LE ROM was WNL (within normal limits)/Left LE ROM was WFL (within functional limits)

## 2021-05-26 NOTE — SWALLOW BEDSIDE ASSESSMENT ADULT - ASR SWALLOW ASPIRATION MONITOR
change of breathing pattern/oral hygiene/position upright (90Y)/cough/gurgly voice/fever/pneumonia/throat clearing/upper respiratory infection
no

## 2021-05-27 PROCEDURE — 99233 SBSQ HOSP IP/OBS HIGH 50: CPT

## 2021-05-27 PROCEDURE — 74230 X-RAY XM SWLNG FUNCJ C+: CPT | Mod: 26

## 2021-05-27 RX ORDER — ASPIRIN/CALCIUM CARB/MAGNESIUM 324 MG
325 TABLET ORAL DAILY
Refills: 0 | Status: DISCONTINUED | OUTPATIENT
Start: 2021-05-27 | End: 2021-05-28

## 2021-05-27 RX ORDER — SODIUM CHLORIDE 9 MG/ML
1000 INJECTION INTRAMUSCULAR; INTRAVENOUS; SUBCUTANEOUS
Refills: 0 | Status: DISCONTINUED | OUTPATIENT
Start: 2021-05-27 | End: 2021-05-28

## 2021-05-27 RX ADMIN — Medication 1 PATCH: at 11:19

## 2021-05-27 RX ADMIN — ENOXAPARIN SODIUM 40 MILLIGRAM(S): 100 INJECTION SUBCUTANEOUS at 11:19

## 2021-05-27 RX ADMIN — Medication 1 PATCH: at 07:50

## 2021-05-27 RX ADMIN — Medication 300 MILLIGRAM(S): at 11:19

## 2021-05-27 RX ADMIN — Medication 325 MILLIGRAM(S): at 21:24

## 2021-05-27 RX ADMIN — SODIUM CHLORIDE 75 MILLILITER(S): 9 INJECTION INTRAMUSCULAR; INTRAVENOUS; SUBCUTANEOUS at 17:17

## 2021-05-27 RX ADMIN — ATORVASTATIN CALCIUM 80 MILLIGRAM(S): 80 TABLET, FILM COATED ORAL at 21:24

## 2021-05-27 RX ADMIN — Medication 1 PATCH: at 21:24

## 2021-05-27 NOTE — PROGRESS NOTE ADULT - SUBJECTIVE AND OBJECTIVE BOX
Saul Marion MD  [phone]    SUBJECTIVE:  Interviewed with Mandarin  #[id number].  Reports feeling better from yesterday.  Denies any left LE weakness per se but feels that gait was unsteady when walking with PT.  Still with left shoulder and hand weakness.  No SOB on RA.  No N/V.  NAD.    MEDICATIONS  (STANDING):  aspirin enteric coated 325 milliGRAM(s) Oral daily  atorvastatin 80 milliGRAM(s) Oral at bedtime  enoxaparin Injectable 40 milliGRAM(s) SubCutaneous daily  nicotine -  14 mG/24Hr(s) Patch 1 patch Transdermal daily    MEDICATIONS  (PRN):  ALBUTerol    90 MICROgram(s) HFA Inhaler 2 Puff(s) Inhalation every 6 hours PRN Shortness of Breath and/or Wheezing      Vital Signs Last 24 Hrs  T(C): 36.5 (27 May 2021 13:43), Max: 36.7 (26 May 2021 16:15)  T(F): 97.7 (27 May 2021 13:43), Max: 98.1 (26 May 2021 16:15)  HR: 93 (27 May 2021 13:43) (75 - 95)  BP: 131/79 (27 May 2021 13:43) (131/79 - 155/84)  BP(mean): --  RR: 18 (27 May 2021 13:43) (18 - 18)  SpO2: 94% (27 May 2021 13:43) (92% - 95%)    CAPILLARY BLOOD GLUCOSE        I&O's Summary    26 May 2021 07:01  -  27 May 2021 07:00  --------------------------------------------------------  IN: 350 mL / OUT: 500 mL / NET: -150 mL        PHYSICAL EXAM:  GENERAL: Looks stated age, NAD  CARDIOVASCULAR: Normal S1, S2  PULMONARY: Lungs clear to auscultation bilaterally. No wheezes/rales/rhonchi  GI: Abdomen non-distended, soft, Nontender.  Bowel sounds present  MSK/Ext:  No leg edema.  No calf tenderness bilaterally  PSYCH: Normal Affect. AAOx3  NEUROLOGY: CN II-XII grossly intact.  5/5 Right UE/LE, left LE strength.  Left hand  4/5, left elbow 5/5 with flexion/extension.   Left shoulder with 3/5 strength with abduction, 4/5 with adduction.     LABS:                        15.3   9.60  )-----------( 340      ( 26 May 2021 07:38 )             46.7     05-26    142  |  109<H>  |  8   ----------------------------<  102<H>  3.9   |  25  |  0.68    Ca    8.6      26 May 2021 07:38  Phos  2.5     05-26  Mg     2.3     05-26    TPro  7.1  /  Alb  3.7  /  TBili  0.5  /  DBili  x   /  AST  25  /  ALT  19  /  AlkPhos  76  05-26    PT/INR - ( 25 May 2021 15:48 )   PT: 10.4 sec;   INR: 0.88 ratio         PTT - ( 25 May 2021 15:48 )  PTT:29.9 sec  CARDIAC MARKERS ( 25 May 2021 15:48 )  <.015 ng/mL / x     / x     / x     / <1.0 ng/mL            RADIOLOGY & ADDITIONAL TESTS:    < from: MR Head No Cont (05.26.21 @ 11:20) >  FINDINGS: Comparison to CT head of 5/25/2021.    There is a small focus of restricted diffusion within the right posterior corona radiata measuring approximately 1.5 x 0.9 cm, compatible with an acute lacunar infarct. No associated mass effect or hemorrhage. There are scattered foci of FLAIR hyperintensity in the periventricular and subcortical white matter of the bilateral cerebri, compatible with mild chronic microvascular ischemic changes. There is no midline shift or herniation pattern. No mass effect is found in the brain.    The ventricles, sulci and basal cisterns are prominent, compatible with age related generalized cerebral volume loss.    The vertebral and internal carotid arteries demonstrate expected flow voids indicating their patency.    The paranasal sinuses are clear.    IMPRESSION:   Small focus of restricted diffusion within the right posterior corona radiata measuring approximately 1.5 x 0.9 cm, compatible with an acute lacunar infarct. No associated mass effect or hemorrhage. Mild periventricular and subcortical white matter chronic microvascular ischemic changes.    < end of copied text >     Saul Marion MD  [phone]    SUBJECTIVE:  Interviewed with Mandarin  #[id number].  Reports feeling better from yesterday.  Denies any left LE weakness per se but feels that gait was unsteady when walking with PT.  Still with left shoulder and hand weakness.  No SOB on RA.  No N/V.  NAD.    Tele: Sinus rhythm, .  No events reported.    MEDICATIONS  (STANDING):  aspirin enteric coated 325 milliGRAM(s) Oral daily  atorvastatin 80 milliGRAM(s) Oral at bedtime  enoxaparin Injectable 40 milliGRAM(s) SubCutaneous daily  nicotine -  14 mG/24Hr(s) Patch 1 patch Transdermal daily    MEDICATIONS  (PRN):  ALBUTerol    90 MICROgram(s) HFA Inhaler 2 Puff(s) Inhalation every 6 hours PRN Shortness of Breath and/or Wheezing      Vital Signs Last 24 Hrs  T(C): 36.5 (27 May 2021 13:43), Max: 36.7 (26 May 2021 16:15)  T(F): 97.7 (27 May 2021 13:43), Max: 98.1 (26 May 2021 16:15)  HR: 93 (27 May 2021 13:43) (75 - 95)  BP: 131/79 (27 May 2021 13:43) (131/79 - 155/84)  BP(mean): --  RR: 18 (27 May 2021 13:43) (18 - 18)  SpO2: 94% (27 May 2021 13:43) (92% - 95%)    CAPILLARY BLOOD GLUCOSE        I&O's Summary    26 May 2021 07:01  -  27 May 2021 07:00  --------------------------------------------------------  IN: 350 mL / OUT: 500 mL / NET: -150 mL        PHYSICAL EXAM:  GENERAL: Looks stated age, NAD  CARDIOVASCULAR: Normal S1, S2  PULMONARY: Lungs clear to auscultation bilaterally. No wheezes/rales/rhonchi  GI: Abdomen non-distended, soft, Nontender.  Bowel sounds present  MSK/Ext:  No leg edema.  No calf tenderness bilaterally  PSYCH: Normal Affect. AAOx3  NEUROLOGY: CN II-XII grossly intact.  5/5 Right UE/LE, left LE strength.  Left hand  4/5, left elbow 5/5 with flexion/extension.   Left shoulder with 3/5 strength with abduction, 4/5 with adduction.     LABS:                        15.3   9.60  )-----------( 340      ( 26 May 2021 07:38 )             46.7     05-26    142  |  109<H>  |  8   ----------------------------<  102<H>  3.9   |  25  |  0.68    Ca    8.6      26 May 2021 07:38  Phos  2.5     05-26  Mg     2.3     05-26    TPro  7.1  /  Alb  3.7  /  TBili  0.5  /  DBili  x   /  AST  25  /  ALT  19  /  AlkPhos  76  05-26    PT/INR - ( 25 May 2021 15:48 )   PT: 10.4 sec;   INR: 0.88 ratio         PTT - ( 25 May 2021 15:48 )  PTT:29.9 sec  CARDIAC MARKERS ( 25 May 2021 15:48 )  <.015 ng/mL / x     / x     / x     / <1.0 ng/mL            RADIOLOGY & ADDITIONAL TESTS:    < from: MR Head No Cont (05.26.21 @ 11:20) >  FINDINGS: Comparison to CT head of 5/25/2021.    There is a small focus of restricted diffusion within the right posterior corona radiata measuring approximately 1.5 x 0.9 cm, compatible with an acute lacunar infarct. No associated mass effect or hemorrhage. There are scattered foci of FLAIR hyperintensity in the periventricular and subcortical white matter of the bilateral cerebri, compatible with mild chronic microvascular ischemic changes. There is no midline shift or herniation pattern. No mass effect is found in the brain.    The ventricles, sulci and basal cisterns are prominent, compatible with age related generalized cerebral volume loss.    The vertebral and internal carotid arteries demonstrate expected flow voids indicating their patency.    The paranasal sinuses are clear.    IMPRESSION:   Small focus of restricted diffusion within the right posterior corona radiata measuring approximately 1.5 x 0.9 cm, compatible with an acute lacunar infarct. No associated mass effect or hemorrhage. Mild periventricular and subcortical white matter chronic microvascular ischemic changes.    < end of copied text >

## 2021-05-27 NOTE — SWALLOW VFSS/MBS ASSESSMENT ADULT - SLP GENERAL OBSERVATIONS
Pt received sitting upright in MBS chair, +lateral view. Pt was awake and cooperative, on room air. Pt was able to follow commands for purposes of study. Pt denied pain pre and post study.

## 2021-05-27 NOTE — SWALLOW VFSS/MBS ASSESSMENT ADULT - RECOMMENDED CONSISTENCY
1. Soft solids (dysphagia 3) with nectar thick liquids via SINGLE/SMALL CUP SIPS equivalent to teaspoon volume.  2. Pt is at a heightened aspiration risk if consuming larger bolus volume.  3. No straws.  4. Aspiration precautions.  5. Safe swallowing guidelines: position upright 90 degrees, small bite/sip, complete full mastication of solid textures, alternate bite/sip, pace meal slowly, and remain upright 30 minutes post meal.  6. Crushed/whole pills in applesauce.  7. Swallowing therapy s/p d/c to maximize oropharyngeal swallow mechanism.  8. Ongoing oral care. IMPRESSIONS: to mild vallecular and pyriform residue post swallow.  5. Mild pharyngeal dysphagia when given honey thick liquids, puree, and regular solids marked by timely pharyngeal swallow trigger, mildly reduced BOT retraction, mildly reduced hyolaryngeal elevation/excursion, and complete epiglottic retroflexion. There was no penetration and/or aspiration pre/during/post swallow. There was trace BOT and posterior pharyngeal wall and mild valleculae and pyriform residue post swallow.    RECOMMENDATIONS:  1. Soft solids (dysphagia 3) with nectar thick liquids via SINGLE/SMALL CUP SIPS equivalent to teaspoon volume.  2. Pt is at a heightened aspiration risk if consuming larger bolus volume.  3. No straws.  4. Aspiration precautions.  5. Safe swallowing guidelines: position upright 90 degrees, small bite/sip, complete full mastication of solid textures, alternate bite/sip, pace meal slowly, and remain upright 30 minutes post meal.  6. Crushed/whole pills in applesauce.  7. Swallowing therapy s/p d/c to maximize oropharyngeal swallow mechanism.  8. Ongoing oral care.

## 2021-05-27 NOTE — PROGRESS NOTE ADULT - SUBJECTIVE AND OBJECTIVE BOX
Neurology follow up note    FUK [id number]      Interval History:    Patient feels ok no new complaints.    MEDICATIONS    ALBUTerol    90 MICROgram(s) HFA Inhaler 2 Puff(s) Inhalation every 6 hours PRN  aspirin enteric coated 325 milliGRAM(s) Oral daily  atorvastatin 80 milliGRAM(s) Oral at bedtime  enoxaparin Injectable 40 milliGRAM(s) SubCutaneous daily  nicotine -  14 mG/24Hr(s) Patch 1 patch Transdermal daily      Allergies    No Known Allergies    Intolerances            Vital Signs Last 24 Hrs  T(C): 36.5 (27 May 2021 13:43), Max: 36.7 (26 May 2021 16:15)  T(F): 97.7 (27 May 2021 13:43), Max: 98.1 (26 May 2021 16:15)  HR: 93 (27 May 2021 13:43) (75 - 95)  BP: 131/79 (27 May 2021 13:43) (131/79 - 155/84)  BP(mean): --  RR: 18 (27 May 2021 13:43) (18 - 18)  SpO2: 94% (27 May 2021 13:43) (92% - 95%)    REVIEW OF SYSTEMS:  Constitutional:  The patient denies fever, chills, or night sweats.  Head:  No headaches.  Eyes:  No double vision or blurry vision.  Ears:  No ringing in the ears.  Neck:  No neck pain.  Cardiovascular:  No chest pain.  Respiratory:  No shortness of breath.  Abdomen:  No nausea, vomiting, or abdominal pain.  Extremities/Neurological:  No numbness or tingling.  Musculoskeletal:  No joint pain.      PHYSICAL EXAMINATION:    HEENT:  Head:  Normocephalic, atraumatic.  Eyes:  No scleral icterus.  Ears:  Hearing bilaterally intact.  NECK:  Supple..  CARDIOVASCULAR:  S1 and S2 heard.  RESPIRATORY:  Good air entry bilaterally.  ABDOMEN:  Soft, nontender.  EXTREMITIES:  No clubbing or cyanosis were noted.      NEUROLOGIC:  The patient is awake, alert,   Extraocular movements were intact.  Pupils were equal, round, and reactive bilaterally 3 mm to 2 mm.  Speech was slight dysarthria noted today.  Smile was slight left drop.  Motor:  Right upper and right lower were 5/5, left upper was able to elevate  bed would fall to bed.  Overall strength was 3/5 bi/triceps hand grasp.   Left lower extremity was 3+/5.  Sensory:  Bilateral upper and lower intact to light touch.                      LABS:  CBC Full  -  ( 26 May 2021 07:38 )  WBC Count : 9.60 K/uL  RBC Count : 5.57 M/uL  Hemoglobin : 15.3 g/dL  Hematocrit : 46.7 %  Platelet Count - Automated : 340 K/uL  Mean Cell Volume : 83.8 fl  Mean Cell Hemoglobin : 27.5 pg  Mean Cell Hemoglobin Concentration : 32.8 gm/dL  Auto Neutrophil # : 6.15 K/uL  Auto Lymphocyte # : 2.21 K/uL  Auto Monocyte # : 1.05 K/uL  Auto Eosinophil # : 0.08 K/uL  Auto Basophil # : 0.05 K/uL  Auto Neutrophil % : 64.2 %  Auto Lymphocyte % : 23.0 %  Auto Monocyte % : 10.9 %  Auto Eosinophil % : 0.8 %  Auto Basophil % : 0.5 %      05-26    142  |  109<H>  |  8   ----------------------------<  102<H>  3.9   |  25  |  0.68    Ca    8.6      26 May 2021 07:38  Phos  2.5     05-26  Mg     2.3     05-26    TPro  7.1  /  Alb  3.7  /  TBili  0.5  /  DBili  x   /  AST  25  /  ALT  19  /  AlkPhos  76  05-26    Hemoglobin A1C:     LIVER FUNCTIONS - ( 26 May 2021 07:38 )  Alb: 3.7 g/dL / Pro: 7.1 g/dL / ALK PHOS: 76 U/L / ALT: 19 U/L / AST: 25 U/L / GGT: x           Vitamin B12   PT/INR - ( 25 May 2021 15:48 )   PT: 10.4 sec;   INR: 0.88 ratio         PTT - ( 25 May 2021 15:48 )  PTT:29.9 sec      RADIOLOGY    ANALYSIS AND PLAN:  This is a 71-year-old with left-sided hemiparesis.  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  What could be calculated from the NIH stroke scale would be 4.  The patient would not be a TPA candidate secondary to timeframe of symptoms, and at present etiology of cerebrovascular accident unknown.  Telemetry evaluation.  Echocardiogram.  We will plan for MRI imaging of the brain.  We would recommend to avoid hypotension, allow for permissive hypertensive.  Do not treat a systolic blood pressure unless it is above 200 or diastolic above 105.  We will start the patient on aspirin.  High-dose statin.  Physical therapy evaluation.  S/S evaluation if able to swallow asa 325     Son's name is John, his telephone number is [phone] spoke to rafael yesterday     Greater than 45 minutes of time was spent with the patient, plan of care, reviewing data, with greater than 50% of the visit was spent counseling and/or coordinating care with multidisciplinary healthcare team     Neurology follow up note    FUK [id number]      Interval History:    Patient feels ok no new complaints.    MEDICATIONS    ALBUTerol    90 MICROgram(s) HFA Inhaler 2 Puff(s) Inhalation every 6 hours PRN  aspirin enteric coated 325 milliGRAM(s) Oral daily  atorvastatin 80 milliGRAM(s) Oral at bedtime  enoxaparin Injectable 40 milliGRAM(s) SubCutaneous daily  nicotine -  14 mG/24Hr(s) Patch 1 patch Transdermal daily      Allergies    No Known Allergies    Intolerances            Vital Signs Last 24 Hrs  T(C): 36.5 (27 May 2021 13:43), Max: 36.7 (26 May 2021 16:15)  T(F): 97.7 (27 May 2021 13:43), Max: 98.1 (26 May 2021 16:15)  HR: 93 (27 May 2021 13:43) (75 - 95)  BP: 131/79 (27 May 2021 13:43) (131/79 - 155/84)  BP(mean): --  RR: 18 (27 May 2021 13:43) (18 - 18)  SpO2: 94% (27 May 2021 13:43) (92% - 95%)    REVIEW OF SYSTEMS:  Constitutional:  The patient denies fever, chills, or night sweats.  Head:  No headaches.  Eyes:  No double vision or blurry vision.  Ears:  No ringing in the ears.  Neck:  No neck pain.  Cardiovascular:  No chest pain.  Respiratory:  No shortness of breath.  Abdomen:  No nausea, vomiting, or abdominal pain.  Extremities/Neurological:  No numbness or tingling.  Musculoskeletal:  No joint pain.      PHYSICAL EXAMINATION:    HEENT:  Head:  Normocephalic, atraumatic.  Eyes:  No scleral icterus.  Ears:  Hearing bilaterally intact.  NECK:  Supple..  CARDIOVASCULAR:  S1 and S2 heard.  RESPIRATORY:  Good air entry bilaterally.  ABDOMEN:  Soft, nontender.  EXTREMITIES:  No clubbing or cyanosis were noted.      NEUROLOGIC:  The patient is awake, alert,   Extraocular movements were intact.  Pupils were equal, round, and reactive bilaterally 3 mm to 2 mm.  Speech was slight dysarthria noted today.  Smile was slight left drop.  Motor:  Right upper and right lower were 5/5, left upper was able to elevate  bed would fall to bed.  Overall strength was 3/5 bi/triceps hand grasp.   Left lower extremity was 3+/5.  Sensory:  Bilateral upper and lower intact to light touch.                      LABS:  CBC Full  -  ( 26 May 2021 07:38 )  WBC Count : 9.60 K/uL  RBC Count : 5.57 M/uL  Hemoglobin : 15.3 g/dL  Hematocrit : 46.7 %  Platelet Count - Automated : 340 K/uL  Mean Cell Volume : 83.8 fl  Mean Cell Hemoglobin : 27.5 pg  Mean Cell Hemoglobin Concentration : 32.8 gm/dL  Auto Neutrophil # : 6.15 K/uL  Auto Lymphocyte # : 2.21 K/uL  Auto Monocyte # : 1.05 K/uL  Auto Eosinophil # : 0.08 K/uL  Auto Basophil # : 0.05 K/uL  Auto Neutrophil % : 64.2 %  Auto Lymphocyte % : 23.0 %  Auto Monocyte % : 10.9 %  Auto Eosinophil % : 0.8 %  Auto Basophil % : 0.5 %      05-26    142  |  109<H>  |  8   ----------------------------<  102<H>  3.9   |  25  |  0.68    Ca    8.6      26 May 2021 07:38  Phos  2.5     05-26  Mg     2.3     05-26    TPro  7.1  /  Alb  3.7  /  TBili  0.5  /  DBili  x   /  AST  25  /  ALT  19  /  AlkPhos  76  05-26    Hemoglobin A1C:     LIVER FUNCTIONS - ( 26 May 2021 07:38 )  Alb: 3.7 g/dL / Pro: 7.1 g/dL / ALK PHOS: 76 U/L / ALT: 19 U/L / AST: 25 U/L / GGT: x           Vitamin B12   PT/INR - ( 25 May 2021 15:48 )   PT: 10.4 sec;   INR: 0.88 ratio         PTT - ( 25 May 2021 15:48 )  PTT:29.9 sec      RADIOLOGY    ANALYSIS AND PLAN:  This is a 71-year-old with left-sided hemiparesis.  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  What could be calculated from the NIH stroke scale would be 4.  The patient would not be a TPA candidate secondary to timeframe of symptoms, and at present etiology of cerebrovascular accident unknown.  Telemetry evaluation.  MRI imaging of the brain noted positive CVA.  We would recommend to avoid hypotension, allow for permissive hypertensive.  Do not treat a systolic blood pressure unless it is above 200 or diastolic above 105.  We will start the patient on aspirin.  High-dose statin.  Physical therapy evaluation.  S/S evaluation if able to swallow asa 325   will start ns to monitor vital     Son's name is John, his telephone number is [phone]  5/27/2021    Greater than 40 minutes of time was spent with the patient, plan of care, reviewing data, with greater than 50% of the visit was spent counseling and/or coordinating care with multidisciplinary healthcare team

## 2021-05-27 NOTE — SWALLOW VFSS/MBS ASSESSMENT ADULT - COMMENTS
Clinical swallow assessment completed 5/26, at which time oral nutrition/hydration/medication was contraindicated. MBS was recommended to determine PO candidacy.    CXR 5/25: "Hyperinflated lungs. No active infiltrates."

## 2021-05-27 NOTE — SWALLOW VFSS/MBS ASSESSMENT ADULT - DIAGNOSTIC IMPRESSIONS
1. Mild oral dysphagia when given thin and nectar thick liquids marked by adequate retrieval and containment, reduced bolus cohesion resulting in premature spillage to hypopharynx, timely oral transit, and adequate clearance post swallow.  2. Functional oral phase when given honey thick liquids, puree, and regular solids marked by adequate retrieval and containment, timely mastication, adequate bolus cohesion, timely oral transit, and adequate clearance post swallow.  3. Moderate to severe pharyngeal dysphagia when given nectar thick liquids marked by brief delay in pharyngeal swallow trigger at the level of the pyriforms, mildly reduced BOT retraction, reduced hoylaryngeal elevation/excursion, and incomplete epiglottic retroflexion. There was SILENT aspiration during the swallow; aspirated material was mild and ejected upon completion of swallow. There was mild residue in laryngeal vestibule post swallow. Pt was cued to cough; volitional was partially effective in ejecting barium from airway. Bolus modification to single/small cup sip (equivalent to teaspoon volume) was successful in eliminating aspiration and resulted in intermittent trace flash penetration with complete retrieval. There was trace BOT and posterior pharyngeal wall and trace to mild valleculae and pyriform residue post swallow.  4. Moderate pharyngeal dysphagia when given thin liquids marked by brief delay in pharyngeal swallow trigger at the level of the pyriforms, mildly reduced BOT retraction, reduced hyolaryngeal elevation/excursion, and incomplete epiglottic retroflexion. There was SILENT penetration during the swallow w/o complete retrieval. Compensatory strategies of chin tuck maneuver, right head turn, and bolus modification to teaspoon volume were unsuccessful in eliminating penetration. There was mild residue in laryngeal vestibule post swallow, which was observed to migrate down towards the level of the VFs despite cued cough. There was trace BOT and posterior pharyngeal wall and trace

## 2021-05-27 NOTE — PROGRESS NOTE ADULT - PROBLEM SELECTOR PLAN 1
Patient presented with left-sided weakness, dizziness.  CT brain was negative for CVA and CTA head/neck was unremarkable but MRI shows a small focus of restricted diffusion within the right posterior corona radiata measuring approximately 1.5 x 0.9 cm, compatible with an acute lacunar infarct.  MBS done, recommended for soft diet with nectar-thick liquids.  Also instructed patient to take small sips of about one teaspoon at a time when drinking.  Continue ASA (change to PO) and will now give Lipitor.  Counseled patient on lifestyle modifications including importance of quitting smoking.  Neuro checks q4.  PT Eval appreciated, recommending rehab.  A1C 6.3, Lipid panel noted, LDL 87, HDL 50, .  Continue tele monitoring.  Fall Risk precautions. Patient presented with left-sided weakness, dizziness.  CT brain was negative for CVA and CTA head/neck was unremarkable but MRI shows a small focus of restricted diffusion within the right posterior corona radiata measuring approximately 1.5 x 0.9 cm, compatible with an acute lacunar infarct.  MBS done, recommended for soft diet with nectar-thick liquids.  Also instructed patient to take small sips of about one teaspoon at a time when drinking.  Continue ASA (change to PO).  Lipitor 80mg qhs.  Case d/w Dr. Barnes, felt speech to be a little more dysarthric today.  Mildly tachycardic now on tele.  Will give additional IVF, NSS @75ml/hr.  Continue tele monitoring.  Counseled patient on lifestyle modifications including importance of quitting smoking.  Neuro checks q4.  PT Eval appreciated, recommending rehab.  A1C 6.3, Lipid panel noted, LDL 87, HDL 50, .  Fall Risk precautions.

## 2021-05-27 NOTE — SWALLOW VFSS/MBS ASSESSMENT ADULT - SLP PERTINENT HISTORY OF CURRENT PROBLEM
Per charting, "71-year-old man with PMH of borderline diabetes, current smoker, COPD presenting with left-sided (UE/LE) weakness and dizziness, admitted for CVA."

## 2021-05-27 NOTE — PROGRESS NOTE ADULT - PROBLEM SELECTOR PLAN 3
Chantix initially held due to strict NPO status.  Does not appear to be formulary.  Will speak to pharmacist to clarify.  Continue nicotine patch 14mg/24 hr transdermal daily for now.

## 2021-05-27 NOTE — PROGRESS NOTE ADULT - PROBLEM SELECTOR PLAN 4
Patient reports history of borderline diabetes, controlled with diet.  Patient with pre-diabetes based on of A1C 6.3, repeat 6.4.  Consistent carb diet.  Nutrition consult.

## 2021-05-27 NOTE — SWALLOW VFSS/MBS ASSESSMENT ADULT - ROSENBEK'S PENETRATION ASPIRATION SCALE
(1) no aspiration, contrast does not enter airway (6) contrast passes glottis, no subglottic residue remains (aspiration) (5) contrast contacts vocal cords, visible residue remains (penetration)

## 2021-05-27 NOTE — PROGRESS NOTE ADULT - PROBLEM SELECTOR PLAN 2
Current Smoker 1/2 pack/day > 50 years.  Advised patient to stop smoking.  Continue albuterol as needed for SOB/Wheezing.

## 2021-05-27 NOTE — SWALLOW VFSS/MBS ASSESSMENT ADULT - ORAL PHASE
within functional limits Incomplete tongue to palate contact/Uncontrolled bolus / spillover in hypopharynx

## 2021-05-28 ENCOUNTER — TRANSCRIPTION ENCOUNTER (OUTPATIENT)
Age: 71
End: 2021-05-28

## 2021-05-28 DIAGNOSIS — R13.10 DYSPHAGIA, UNSPECIFIED: ICD-10-CM

## 2021-05-28 DIAGNOSIS — Z02.9 ENCOUNTER FOR ADMINISTRATIVE EXAMINATIONS, UNSPECIFIED: ICD-10-CM

## 2021-05-28 DIAGNOSIS — K59.00 CONSTIPATION, UNSPECIFIED: ICD-10-CM

## 2021-05-28 LAB
ANION GAP SERPL CALC-SCNC: 7 MMOL/L — SIGNIFICANT CHANGE UP (ref 5–17)
BUN SERPL-MCNC: 14 MG/DL — SIGNIFICANT CHANGE UP (ref 7–23)
CALCIUM SERPL-MCNC: 8.5 MG/DL — SIGNIFICANT CHANGE UP (ref 8.5–10.1)
CHLORIDE SERPL-SCNC: 109 MMOL/L — HIGH (ref 96–108)
CO2 SERPL-SCNC: 27 MMOL/L — SIGNIFICANT CHANGE UP (ref 22–31)
CREAT SERPL-MCNC: 0.73 MG/DL — SIGNIFICANT CHANGE UP (ref 0.5–1.3)
GLUCOSE SERPL-MCNC: 111 MG/DL — HIGH (ref 70–99)
HCT VFR BLD CALC: 50.2 % — HIGH (ref 39–50)
HGB BLD-MCNC: 16 G/DL — SIGNIFICANT CHANGE UP (ref 13–17)
MCHC RBC-ENTMCNC: 26.5 PG — LOW (ref 27–34)
MCHC RBC-ENTMCNC: 31.9 GM/DL — LOW (ref 32–36)
MCV RBC AUTO: 83.1 FL — SIGNIFICANT CHANGE UP (ref 80–100)
NRBC # BLD: 0 /100 WBCS — SIGNIFICANT CHANGE UP (ref 0–0)
PLATELET # BLD AUTO: 336 K/UL — SIGNIFICANT CHANGE UP (ref 150–400)
POTASSIUM SERPL-MCNC: 4 MMOL/L — SIGNIFICANT CHANGE UP (ref 3.5–5.3)
POTASSIUM SERPL-SCNC: 4 MMOL/L — SIGNIFICANT CHANGE UP (ref 3.5–5.3)
RBC # BLD: 6.04 M/UL — HIGH (ref 4.2–5.8)
RBC # FLD: 13.8 % — SIGNIFICANT CHANGE UP (ref 10.3–14.5)
SODIUM SERPL-SCNC: 143 MMOL/L — SIGNIFICANT CHANGE UP (ref 135–145)
WBC # BLD: 10.95 K/UL — HIGH (ref 3.8–10.5)
WBC # FLD AUTO: 10.95 K/UL — HIGH (ref 3.8–10.5)

## 2021-05-28 PROCEDURE — 99233 SBSQ HOSP IP/OBS HIGH 50: CPT

## 2021-05-28 RX ORDER — POLYETHYLENE GLYCOL 3350 17 G/17G
17 POWDER, FOR SOLUTION ORAL ONCE
Refills: 0 | Status: COMPLETED | OUTPATIENT
Start: 2021-05-28 | End: 2021-05-28

## 2021-05-28 RX ORDER — TIOTROPIUM BROMIDE 18 UG/1
1 CAPSULE ORAL; RESPIRATORY (INHALATION) DAILY
Refills: 0 | Status: DISCONTINUED | OUTPATIENT
Start: 2021-05-28 | End: 2021-06-03

## 2021-05-28 RX ORDER — CLOPIDOGREL BISULFATE 75 MG/1
75 TABLET, FILM COATED ORAL DAILY
Refills: 0 | Status: DISCONTINUED | OUTPATIENT
Start: 2021-05-28 | End: 2021-06-03

## 2021-05-28 RX ORDER — BUDESONIDE AND FORMOTEROL FUMARATE DIHYDRATE 160; 4.5 UG/1; UG/1
2 AEROSOL RESPIRATORY (INHALATION)
Refills: 0 | Status: DISCONTINUED | OUTPATIENT
Start: 2021-05-28 | End: 2021-06-03

## 2021-05-28 RX ORDER — ASPIRIN/CALCIUM CARB/MAGNESIUM 324 MG
81 TABLET ORAL DAILY
Refills: 0 | Status: DISCONTINUED | OUTPATIENT
Start: 2021-05-28 | End: 2021-06-03

## 2021-05-28 RX ADMIN — ENOXAPARIN SODIUM 40 MILLIGRAM(S): 100 INJECTION SUBCUTANEOUS at 11:22

## 2021-05-28 RX ADMIN — POLYETHYLENE GLYCOL 3350 17 GRAM(S): 17 POWDER, FOR SOLUTION ORAL at 18:36

## 2021-05-28 RX ADMIN — Medication 1 PATCH: at 20:10

## 2021-05-28 RX ADMIN — TIOTROPIUM BROMIDE 1 CAPSULE(S): 18 CAPSULE ORAL; RESPIRATORY (INHALATION) at 21:24

## 2021-05-28 RX ADMIN — Medication 1 PATCH: at 04:56

## 2021-05-28 RX ADMIN — BUDESONIDE AND FORMOTEROL FUMARATE DIHYDRATE 2 PUFF(S): 160; 4.5 AEROSOL RESPIRATORY (INHALATION) at 18:38

## 2021-05-28 RX ADMIN — Medication 1 PATCH: at 11:21

## 2021-05-28 RX ADMIN — Medication 1 PATCH: at 11:22

## 2021-05-28 RX ADMIN — Medication 325 MILLIGRAM(S): at 11:22

## 2021-05-28 RX ADMIN — SODIUM CHLORIDE 75 MILLILITER(S): 9 INJECTION INTRAMUSCULAR; INTRAVENOUS; SUBCUTANEOUS at 04:58

## 2021-05-28 RX ADMIN — ATORVASTATIN CALCIUM 80 MILLIGRAM(S): 80 TABLET, FILM COATED ORAL at 21:23

## 2021-05-28 NOTE — PROGRESS NOTE ADULT - PROBLEM SELECTOR PLAN 2
Current Smoker 1/2 pack/day > 50 years.  Advised patient to stop smoking.  Will order Spiriva and Symbicort in place of home inhaler medication.  Continue albuterol as needed for SOB/Wheezing. MBS done, recommended for soft diet with nectar-thick liquids.  Again instructed patient to take small sips of about one teaspoon at a time when drinking.  Case d/w Speech Pathologist.  Recommending swallow therapy after discharge and repeat MBS after discharge.

## 2021-05-28 NOTE — DISCHARGE NOTE PROVIDER - NSDCFUADDINST_GEN_ALL_CORE_FT
form 5/28 as start date after total of 90 days dc the plavix and continue asprin 81 mg  You should take the Plavix (Clopidogrel) 75mg daily for 90 days starting from Friday May 28th and then discontinue after those 90 days.  You should take Aspirin 81mg daily and Lipitor (Atorvastatin) 80mg at bedtime indefinitely unless and until instructed otherwise by your outpatient doctors.

## 2021-05-28 NOTE — PROGRESS NOTE ADULT - PROBLEM SELECTOR PLAN 5
Lovenox 40mg SC daily for DVT prophylaxis. Will give Miralax x1. Patient reports history of borderline diabetes, controlled with diet.  Patient with pre-diabetes based on of A1C 6.3, repeat 6.4.  Consistent carb diet.  Nutrition consult/counseling given.

## 2021-05-28 NOTE — PROGRESS NOTE ADULT - PROBLEM SELECTOR PLAN 3
Chantix initially held due to strict NPO status.  Continue nicotine patch 14mg/24 hr transdermal daily. Chantix initially held due to strict NPO status.  Continue nicotine patch 14mg/24 hr transdermal daily.  Patient agreeable to continue nicotine patch in place of Chantix on discharge (as he was still smoking while taking Chantix).  Confirmed with pharmacist, dosing would be 14mg/24 hr patch for 6 weeks total then 7mg/24 hr patch for two more weeks. Current Smoker 1/2 pack/day > 50 years.  Advised patient to stop smoking.  Will order Spiriva and Symbicort in place of home inhaler medication.  Continue albuterol as needed for SOB/Wheezing.

## 2021-05-28 NOTE — DISCHARGE NOTE PROVIDER - HOSPITAL COURSE
The patient is a 71-year-old man, current 1/2 ppd smoker, with PMH of COPD and borderline DM who p/w initially presented with left arm and leg weakness that started while the patient was on a boat fishing.  The patient was alone and was able to row back to shore, but when he got out of the boat, he became dizzy, and had difficulty ambulating due to extreme weakness of the left side of his body.  The weakness remained until he reached home. He laid down in bed and about 30 minutes later, but began to experience the weakness again.  At that point he called his PCP and was referred to the ED.    On arrival, the patient reported continued left-sided weakness and also reported some difficulty swallowing when trying to take an aspirin in the ED.  In the ED, CT brain was negative for a CVA and CTA of the head/neck unremarkable.  The patient was admitted to the medicine service and followed by neurology on consult.    The patient initially failed his dysphagia screen and was kept NPO.  An MRI was performed that showed a small focus of restricted diffusion within the right posterior corona radiata measuring approximately 1.5 x 0.9 cm, compatible with an acute lacunar infarct.  The patient underwent an MBS and was recommended for a soft diet with nectar-thick liquids with small sips of liquid.  During the course of the hospital stay, the left lower extremity weakness resolved but the patient noted unsteadiness with his gait.  The patient also had persistent weakness with the left hand grap and weakness by the left shoulder.    The patient was seen by PT and OT and recommended for rehab.  In addition, Speech Pathology recommended swallow therapy with possible repeat MBS after discharge.  The patient was initially treated with Aspirin and then Lipitor was added once the patient was advanced to a diet.  The patient was then switched to Plavix 75mg daily for 90 days with Aspirin 81mg daily to continue indefinitely along with the Lipitor.    The patient was repeatedly counseled during the hospital course on the importance of smoking cessation.  Prior to admission, the patient had been taking Chantix but was still smoking while on Chantix.  In the hospital, the patient was started on a Nicotine patch and was agreeable to continuing on the nicotine patch and tapering off after discharge.    With regard to the history of borderline diabetes, A1C level was checked and was elevated at 6.4, consistent with pre-diabetes.  The patient was seen by nutrition and dietary education was provided.

## 2021-05-28 NOTE — DISCHARGE NOTE PROVIDER - NSDCCPCAREPLAN_GEN_ALL_CORE_FT
PRINCIPAL DISCHARGE DIAGNOSIS  Diagnosis: CVA (cerebral vascular accident)  Assessment and Plan of Treatment: Please take your aspirin, Plavix (Clopidogrel), and Lipitor (Atorvastatin) as prescribed.  You are being discharged to a rehabilitation facility following your stroke.  Please follow-up with your primary care doctor after discharge from rehab.  Please also follow-up with      SECONDARY DISCHARGE DIAGNOSES  Diagnosis: Dysphagia  Assessment and Plan of Treatment:     Diagnosis: Cigarette smoker  Assessment and Plan of Treatment:     Diagnosis: Borderline diabetes  Assessment and Plan of Treatment:     Diagnosis: COPD (chronic obstructive pulmonary disease)  Assessment and Plan of Treatment:      PRINCIPAL DISCHARGE DIAGNOSIS  Diagnosis: CVA (cerebral vascular accident)  Assessment and Plan of Treatment: Please take your aspirin, Plavix (Clopidogrel), and Lipitor (Atorvastatin) as prescribed.    Please note:  You are instructed to take the Plavix 75mg daily for 90 days starting from Friday May 28th and then discontinue.  You should take Aspirin 81mg daily and Lipitor 80mg at bedtime indefinitely unless and until instructed otherwise by your outpatient doctors.  You are being discharged to a rehabilitation facility following your stroke.  Please follow-up with your primary care doctor after discharge from   Please also follow-up with a neurologist after discharge from   If you do not have a neurologist, we recommend that you follow-up with Dr. Mora.      SECONDARY DISCHARGE DIAGNOSES  Diagnosis: Dysphagia  Assessment and Plan of Treatment: Please maintain a soft diet with nectar-thick liquids.  You will need swallow therapy and should be referred for a repeat modified barium swallow (MBS) study after therapy to see if your diet could be adjusted.    Diagnosis: Cigarette smoker  Assessment and Plan of Treatment: Please taper off the nicotine patch as prescribed and please refrain from smoking.  Do NOT smoke while taking a nicotine patch.    Diagnosis: Borderline diabetes  Assessment and Plan of Treatment: Please maintain a consistent carbohydrate diet and instructed.    Diagnosis: COPD (chronic obstructive pulmonary disease)  Assessment and Plan of Treatment: Please continue with your home inhaler medications.

## 2021-05-28 NOTE — PROGRESS NOTE ADULT - PROBLEM SELECTOR PLAN 7
Lovenox 40mg SC daily for DVT prophylaxis.    Case discussed with patient's sons, Srinath and John, over the phone with patient's consent.

## 2021-05-28 NOTE — OCCUPATIONAL THERAPY INITIAL EVALUATION ADULT - ADL RETRAINING, OT EVAL
Pt will complete UB dressing Melissa with vc's for sequencing compensatory techniques within 1 week. Pt will feed self using dominant LUE and Melissa post setup within 1 week

## 2021-05-28 NOTE — PROGRESS NOTE ADULT - PROBLEM SELECTOR PLAN 4
Patient reports history of borderline diabetes, controlled with diet.  Patient with pre-diabetes based on of A1C 6.3, repeat 6.4.  Consistent carb diet.  Nutrition consult/counseling given. Chantix initially held due to strict NPO status.  Continue nicotine patch 14mg/24 hr transdermal daily.  Patient agreeable to continue nicotine patch in place of Chantix on discharge (as he was still smoking while taking Chantix).  Confirmed with pharmacist, dosing would be 14mg/24 hr patch for 6 weeks total then 7mg/24 hr patch for two more weeks.

## 2021-05-28 NOTE — PROGRESS NOTE ADULT - SUBJECTIVE AND OBJECTIVE BOX
Neurology follow up note    FUK [id number]      Interval History:    Patient feels ok no new complaints.    MEDICATIONS    ALBUTerol    90 MICROgram(s) HFA Inhaler 2 Puff(s) Inhalation every 6 hours PRN  aspirin enteric coated 325 milliGRAM(s) Oral daily  atorvastatin 80 milliGRAM(s) Oral at bedtime  enoxaparin Injectable 40 milliGRAM(s) SubCutaneous daily  nicotine -  14 mG/24Hr(s) Patch 1 patch Transdermal daily  sodium chloride 0.9%. 1000 milliLiter(s) IV Continuous <Continuous>      Allergies    No Known Allergies    Intolerances            Vital Signs Last 24 Hrs  T(C): 36.5 (28 May 2021 11:28), Max: 36.7 (28 May 2021 04:30)  T(F): 97.7 (28 May 2021 11:28), Max: 98 (28 May 2021 04:30)  HR: 97 (28 May 2021 11:28) (71 - 98)  BP: 145/78 (28 May 2021 11:28) (131/79 - 167/73)  BP(mean): --  RR: 17 (28 May 2021 11:28) (17 - 19)  SpO2: 98% (28 May 2021 11:28) (94% - 98%)    REVIEW OF SYSTEMS:  Constitutional:  The patient denies fever, chills, or night sweats.  Head:  No headaches.  Eyes:  No double vision or blurry vision.  Ears:  No ringing in the ears.  Neck:  No neck pain.  Cardiovascular:  No chest pain.  Respiratory:  No shortness of breath.  Abdomen:  No nausea, vomiting, or abdominal pain.  Extremities/Neurological:  No numbness or tingling.  Musculoskeletal:  No joint pain.      PHYSICAL EXAMINATION:    HEENT:  Head:  Normocephalic, atraumatic.  Eyes:  No scleral icterus.  Ears:  Hearing bilaterally intact.  NECK:  Supple..  CARDIOVASCULAR:  S1 and S2 heard.  RESPIRATORY:  Good air entry bilaterally.  ABDOMEN:  Soft, nontender.  EXTREMITIES:  No clubbing or cyanosis were noted.      NEUROLOGIC:  The patient is awake, alert,   Extraocular movements were intact.  Pupils were equal, round, and reactive bilaterally 3 mm to 2 mm.  Speech was less dysarthria noted today.  Smile was less slight left drop.  Motor:  Right upper and right lower were 5/5, left upper was able to elevate  bed would fall to bed.  Overall strength was 3/5 bi/triceps hand grasp.   Left lower extremity was 4/5.  Sensory:  Bilateral upper and lower intact to light touch.                           LABS:  CBC Full  -  ( 28 May 2021 11:35 )  WBC Count : 10.95 K/uL  RBC Count : 6.04 M/uL  Hemoglobin : 16.0 g/dL  Hematocrit : 50.2 %  Platelet Count - Automated : 336 K/uL  Mean Cell Volume : 83.1 fl  Mean Cell Hemoglobin : 26.5 pg  Mean Cell Hemoglobin Concentration : 31.9 gm/dL  Auto Neutrophil # : x  Auto Lymphocyte # : x  Auto Monocyte # : x  Auto Eosinophil # : x  Auto Basophil # : x  Auto Neutrophil % : x  Auto Lymphocyte % : x  Auto Monocyte % : x  Auto Eosinophil % : x  Auto Basophil % : x      05-28    143  |  109<H>  |  14  ----------------------------<  111<H>  4.0   |  27  |  0.73    Ca    8.5      28 May 2021 06:50      Hemoglobin A1C:       Vitamin B12         RADIOLOGY    ANALYSIS AND PLAN:  This is a 71-year-old with left-sided hemiparesis.  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  What could be calculated from the NIH stroke scale would be 4.  The patient would not be a TPA candidate secondary to timeframe of symptoms, and at present etiology of cerebrovascular accident unknown.  Telemetry evaluation.  MRI imaging of the brain noted positive CVA.  High-dose statin.  Physical therapy evaluation.  will change asa 81 and plavix 75 and after total of 90 days ok to dc plavix     Son's name is John, his telephone number is [phone]  5/27/2021 spoke to rafael today     Greater than 40 minutes of time was spent with the patient, plan of care, reviewing data, with greater than 50% of the visit was spent counseling and/or coordinating care with multidisciplinary healthcare team

## 2021-05-28 NOTE — DIETITIAN INITIAL EVALUATION ADULT. - OTHER INFO
Pt s/p CVA, seen by SLP, on Dys 3 diet, nectar liquids at present. Will follow for SLP re eval for diet liberalization as permitted. Reviewed concepts of consistent CHO eating pattern with pt, limiting portion sizes of CHO containing foods. Pt verbalizes understanding.

## 2021-05-28 NOTE — DIETITIAN INITIAL EVALUATION ADULT. - ORAL INTAKE PTA/DIET HISTORY
Pt with limited English, but able to answer questions. appetite good, tolerating diet. Private doctor has discussed pre DM with pt and counseled him on managing glucose/diet. Pt states he will read over info and call or have son call with any questions.
7

## 2021-05-28 NOTE — OCCUPATIONAL THERAPY INITIAL EVALUATION ADULT - RANGE OF MOTION EXAMINATION, UPPER EXTREMITY
Pt is left hand dominant. LUE AROM WFL throughout elbow/wrist/forearm; able to achieve mass grasp/release, shoulder flex approx 0-90. Sensation intact to light touch. Moderately impaired FMC, able to oppose 2nd and 3rd digits only LUE. Mild dysmetria finger to/from nose./Right UE Active ROM was WFL (within functional limits)

## 2021-05-28 NOTE — DISCHARGE NOTE PROVIDER - CARE PROVIDER_API CALL
LUZMARIA DIOR  [id number]  [address] [place] [address]  Phone: [phone]  Fax: [phone]  Follow Up Time:     Deng Mora (DO)  Neurology; Vascular Neurology  [address] [facility], [address]  [place] [address]  Phone: [phone]  Fax: [phone]  Follow Up Time:

## 2021-05-28 NOTE — OCCUPATIONAL THERAPY INITIAL EVALUATION ADULT - GENERAL OBSERVATIONS, REHAB EVAL
Pt received seated in bedside chair (+) alarm, (+) tele; NAD. Pt alert, pleasant, cooperative with OT for eval.

## 2021-05-28 NOTE — DIETITIAN INITIAL EVALUATION ADULT. - PROBLEM SELECTOR PLAN 1
Patient presenting with left sided weakness, dizziness likely 2/2 to neurological event, CVA   CT Brain Stroke Protocol showing no acute ischemic events, no stenosis   - NIHSS 2, residual weakness left side  - MRI head noncon for AM   - Neuro checks q4  - Strict NPO due to dysphagia pending bedside dysphagia screen; Speech and Swallow Consult  - ASA suppository daily; lipitor held due to strict NPO status may start lipitor 80 mg QHS once passes dysphagia screen  - PT Evaluation  - Lipid panel/HA1C for AM  - Admit to tele   - Fall Risk precautions, Aspiration Precautions  - Neuro, Molly consulted appreciate recs

## 2021-05-28 NOTE — PROGRESS NOTE ADULT - SUBJECTIVE AND OBJECTIVE BOX
Saul Marion MD  [phone]    SUBJECTIVE:  Interviewed with Cheko  #[id number].  Overall feeling better.  No left leg weakness but still requiring a walker and does express concern with using a walker giving the decreased  strength in the left hand.  Left shoulder still weak.  Tolerating modified diet.  No N/V.  NAD.    Tele: NSR, no events reported     MEDICATIONS  (STANDING):  aspirin enteric coated 325 milliGRAM(s) Oral daily  atorvastatin 80 milliGRAM(s) Oral at bedtime  enoxaparin Injectable 40 milliGRAM(s) SubCutaneous daily  nicotine -  14 mG/24Hr(s) Patch 1 patch Transdermal daily  sodium chloride 0.9%. 1000 milliLiter(s) (75 mL/Hr) IV Continuous <Continuous>    MEDICATIONS  (PRN):  ALBUTerol    90 MICROgram(s) HFA Inhaler 2 Puff(s) Inhalation every 6 hours PRN Shortness of Breath and/or Wheezing      Vital Signs Last 24 Hrs  T(C): 36.5 (28 May 2021 11:28), Max: 36.7 (28 May 2021 04:30)  T(F): 97.7 (28 May 2021 11:28), Max: 98 (28 May 2021 04:30)  HR: 97 (28 May 2021 11:28) (71 - 98)  BP: 145/78 (28 May 2021 11:28) (131/79 - 167/73)  BP(mean): --  RR: 17 (28 May 2021 11:28) (17 - 19)  SpO2: 98% (28 May 2021 11:28) (94% - 98%)    CAPILLARY BLOOD GLUCOSE        I&O's Summary    27 May 2021 07:01  -  28 May 2021 07:00  --------------------------------------------------------  IN: 900 mL / OUT: 600 mL / NET: 300 mL    28 May 2021 07:01  -  28 May 2021 12:20  --------------------------------------------------------  IN: 150 mL / OUT: 500 mL / NET: -350 mL        PHYSICAL EXAM:  GENERAL: Looks stated age, NAD  CARDIOVASCULAR: Normal S1, S2  PULMONARY: Lungs clear to auscultation bilaterally. No wheezes/rales/rhonchi  GI: Abdomen non-distended, soft, Nontender.  Bowel sounds present  MSK/Ext:  No leg edema.  No calf tenderness bilaterally  PSYCH: Normal Affect.  NEUROLOGY: CN II-XII grossly intact.  No dysarthria appreciated with speaking to patient via  or with English.  5/5 right UE and b/l LE strength.  Left hand  4/5, left elbow 5/5 with flexion/extension.   Left shoulder with 3/5 strength with abduction, 4+/5 with adduction.     LABS:                        16.0   10.95 )-----------( 336      ( 28 May 2021 11:35 )             50.2     05-28    143  |  109<H>  |  14  ----------------------------<  111<H>  4.0   |  27  |  0.73    Ca    8.5      28 May 2021 06:50                  RADIOLOGY & ADDITIONAL TESTS:       Saul Marion MD  [phone]    SUBJECTIVE:  Interviewed with Cheko  #[id number].  Overall feeling better.  No left leg weakness but still requiring a walker and does express concern with using a walker giving the decreased  strength in the left hand.  Left shoulder still weak.  Tolerating modified diet.  No N/V.  Last BM three days ago, reports feeling constipated, agreeable to trying laxative.  NAD.    Tele: NSR, no events reported     MEDICATIONS  (STANDING):  aspirin enteric coated 325 milliGRAM(s) Oral daily  atorvastatin 80 milliGRAM(s) Oral at bedtime  enoxaparin Injectable 40 milliGRAM(s) SubCutaneous daily  nicotine -  14 mG/24Hr(s) Patch 1 patch Transdermal daily  sodium chloride 0.9%. 1000 milliLiter(s) (75 mL/Hr) IV Continuous <Continuous>    MEDICATIONS  (PRN):  ALBUTerol    90 MICROgram(s) HFA Inhaler 2 Puff(s) Inhalation every 6 hours PRN Shortness of Breath and/or Wheezing      Vital Signs Last 24 Hrs  T(C): 36.5 (28 May 2021 11:28), Max: 36.7 (28 May 2021 04:30)  T(F): 97.7 (28 May 2021 11:28), Max: 98 (28 May 2021 04:30)  HR: 97 (28 May 2021 11:28) (71 - 98)  BP: 145/78 (28 May 2021 11:28) (131/79 - 167/73)  BP(mean): --  RR: 17 (28 May 2021 11:28) (17 - 19)  SpO2: 98% (28 May 2021 11:28) (94% - 98%)    CAPILLARY BLOOD GLUCOSE        I&O's Summary    27 May 2021 07:01  -  28 May 2021 07:00  --------------------------------------------------------  IN: 900 mL / OUT: 600 mL / NET: 300 mL    28 May 2021 07:01  -  28 May 2021 12:20  --------------------------------------------------------  IN: 150 mL / OUT: 500 mL / NET: -350 mL        PHYSICAL EXAM:  GENERAL: Looks stated age, NAD  CARDIOVASCULAR: Normal S1, S2  PULMONARY: Lungs clear to auscultation bilaterally. No wheezes/rales/rhonchi  GI: Abdomen non-distended, soft, Nontender.  Bowel sounds present  MSK/Ext:  No leg edema.  No calf tenderness bilaterally  PSYCH: Normal Affect.  NEUROLOGY: CN II-XII grossly intact.  No dysarthria appreciated with speaking to patient via  or with English.  5/5 right UE and b/l LE strength.  Left hand  4/5, left elbow 5/5 with flexion/extension.   Left shoulder with 3/5 strength with abduction, 4+/5 with adduction.     LABS:                        16.0   10.95 )-----------( 336      ( 28 May 2021 11:35 )             50.2     05-28    143  |  109<H>  |  14  ----------------------------<  111<H>  4.0   |  27  |  0.73    Ca    8.5      28 May 2021 06:50                  RADIOLOGY & ADDITIONAL TESTS:

## 2021-05-28 NOTE — PROGRESS NOTE ADULT - PROBLEM SELECTOR PLAN 1
Patient presented with left-sided weakness, dizziness.  CT brain was negative for CVA and CTA head/neck was unremarkable but MRI shows a small focus of restricted diffusion within the right posterior corona radiata measuring approximately 1.5 x 0.9 cm, compatible with an acute lacunar infarct.  MBS done, recommended for soft diet with nectar-thick liquids.  Again instructed patient to take small sips of about one teaspoon at a time when drinking.  Continue ASA and Lipitor.  Started on IVF @75ml/hr on 5/27 after noted to be mildly tachycardic and found by Dr. Barnes to have some dysarthria on exam.  Tachycardia resolved, no apparent dysarthria on my exam.  Continue IVF for now.  Neurology follow-up.  Counseled patient on lifestyle modifications including importance of quitting smoking.  Neuro checks q4.  PT and OT evals appreciated, recommending rehab.  A1C 6.3, Lipid panel noted, LDL 87, HDL 50, .  Fall Risk precautions. Patient presented with left-sided weakness, dizziness.  CT brain was negative for CVA and CTA head/neck was unremarkable but MRI shows a small focus of restricted diffusion within the right posterior corona radiata measuring approximately 1.5 x 0.9 cm, compatible with an acute lacunar infarct.  MBS done, recommended for soft diet with nectar-thick liquids.  Again instructed patient to take small sips of about one teaspoon at a time when drinking.  Started on IVF @75ml/hr on 5/27 after noted to be mildly tachycardic and found by Dr. Barnes to have some dysarthria on exam.  Tachycardia resolved, no apparent dysarthria on my exam.  Case d/w Dr. Barnes, will discontinue IVF.  Possible discharge to rehab tomorrow if stable off IV fluids.  Plavix 75mg daily for 90 days and ASA 81mg daily indefinitely.  Continue Lipitor.  Counseled patient on lifestyle modifications including importance of quitting smoking.  PT and OT evals appreciated, recommending rehab.  A1C 6.3, Lipid panel noted, LDL 87, HDL 50, .  Fall Risk precautions. Patient presented with left-sided weakness, dizziness.  CT brain was negative for CVA and CTA head/neck was unremarkable but MRI shows a small focus of restricted diffusion within the right posterior corona radiata measuring approximately 1.5 x 0.9 cm, compatible with an acute lacunar infarct.  Started on IVF @75ml/hr on 5/27 after noted to be mildly tachycardic and found by Dr. Barnes to have some dysarthria on exam.  Tachycardia resolved, no apparent dysarthria on my exam.  Case d/w Dr. Barnes, will discontinue IVF.  Possible discharge to rehab tomorrow if stable off IV fluids.  Plavix 75mg daily for 90 days and ASA 81mg daily indefinitely.  Continue Lipitor.  Counseled patient on lifestyle modifications including importance of quitting smoking.  PT and OT evals appreciated, recommending rehab.  A1C 6.3, Lipid panel noted, LDL 87, HDL 50, .  Fall Risk precautions. Patient presented with left-sided weakness, dizziness.  CT brain was negative for CVA and CTA head/neck was unremarkable but MRI shows a small focus of restricted diffusion within the right posterior corona radiata measuring approximately 1.5 x 0.9 cm, compatible with an acute lacunar infarct.  Started on IVF @75ml/hr on 5/27 after noted to be mildly tachycardic and found by Dr. Barnes to have some dysarthria on exam.  Tachycardia resolved, no apparent dysarthria on my exam.  Case d/w Dr. Barnes, will discontinue IVF.  Plavix 75mg daily for 90 days and ASA 81mg daily indefinitely.  Continue Lipitor.  Counseled patient on lifestyle modifications including importance of quitting smoking.  PT and OT evals appreciated, recommending rehab.  A1C 6.3, Lipid panel noted, LDL 87, HDL 50, .  Fall Risk precautions.

## 2021-05-28 NOTE — OCCUPATIONAL THERAPY INITIAL EVALUATION ADULT - PERTINENT HX OF CURRENT PROBLEM, REHAB EVAL
70 yo M pmhx borderline diabetes, current smoker, COPD presenting with left arm and leg weakness that started 11:45 AM when patient was on a boat fishing. He was alone and was able to row back to his car. When he got out of the boat, he started to become dizzy, as if he was going to pass out and found that he had difficulty ambulating due to extreme weakness of the left side of his body. MRI head: (+) acute lacunar infarct.

## 2021-05-28 NOTE — DISCHARGE NOTE PROVIDER - NSDCMRMEDTOKEN_GEN_ALL_CORE_FT
Albuterol (Eqv-ProAir HFA) 90 mcg/inh inhalation aerosol: 2 puff(s) inhaled every 6 hours, As Needed  cetirizine 10 mg oral tablet: 1 tab(s) orally once a day  Chantix 1 mg oral tablet: 1 tab(s) orally once a day  hydrocortisone 2.5% topical ointment: Apply topically to affected area 2 times a day  Stiolto Respimat 60 ACT 2.5 mcg-2.5 mcg/inh inhalation aerosol: 2 puff(s) inhaled every 24 hours   Albuterol (Eqv-ProAir HFA) 90 mcg/inh inhalation aerosol: 2 puff(s) inhaled every 6 hours, As Needed  aspirin 81 mg oral delayed release tablet: 1 tab(s) orally once a day  atorvastatin 80 mg oral tablet: 1 tab(s) orally once a day (at bedtime)  cetirizine 10 mg oral tablet: 1 tab(s) orally once a day  Chantix 1 mg oral tablet: 1 tab(s) orally once a day  clopidogrel 75 mg oral tablet: 1 tab(s) orally once a day  nicotine 14 mg/24 hr transdermal film, extended release:  transdermal   Stiolto Respimat 60 ACT 2.5 mcg-2.5 mcg/inh inhalation aerosol: 2 puff(s) inhaled every 24 hours  tiotropium 18 mcg inhalation capsule: 1 cap(s) inhaled once a day

## 2021-05-28 NOTE — OCCUPATIONAL THERAPY INITIAL EVALUATION ADULT - ADDITIONAL COMMENTS
Pt lives with his wife and son in a private home, 1 step to enter, bedroom on main level. Bathroom has a tub. PTA pt was independent with ADLs/IADLs and functional mobility without AD. (+) .

## 2021-05-28 NOTE — PROGRESS NOTE ADULT - PROBLEM SELECTOR PLAN 8
Case d/w SW, hopeful for discharge to rehab on 6/1.  Ordered Covid PCR for Sunday 5/30 in anticipation of discharge on 6/1.

## 2021-05-28 NOTE — OCCUPATIONAL THERAPY INITIAL EVALUATION ADULT - STRENGTHENING, PT EVAL
Pt will increased LUE strength by 1/2 grade to promote increased functional use in ADLs within 1 week

## 2021-05-29 LAB
HCT VFR BLD CALC: 48.8 % — SIGNIFICANT CHANGE UP (ref 39–50)
HGB BLD-MCNC: 16 G/DL — SIGNIFICANT CHANGE UP (ref 13–17)
MCHC RBC-ENTMCNC: 27.1 PG — SIGNIFICANT CHANGE UP (ref 27–34)
MCHC RBC-ENTMCNC: 32.8 GM/DL — SIGNIFICANT CHANGE UP (ref 32–36)
MCV RBC AUTO: 82.7 FL — SIGNIFICANT CHANGE UP (ref 80–100)
NRBC # BLD: 0 /100 WBCS — SIGNIFICANT CHANGE UP (ref 0–0)
PLATELET # BLD AUTO: 333 K/UL — SIGNIFICANT CHANGE UP (ref 150–400)
RBC # BLD: 5.9 M/UL — HIGH (ref 4.2–5.8)
RBC # FLD: 13.5 % — SIGNIFICANT CHANGE UP (ref 10.3–14.5)
WBC # BLD: 8.45 K/UL — SIGNIFICANT CHANGE UP (ref 3.8–10.5)
WBC # FLD AUTO: 8.45 K/UL — SIGNIFICANT CHANGE UP (ref 3.8–10.5)

## 2021-05-29 PROCEDURE — 99232 SBSQ HOSP IP/OBS MODERATE 35: CPT

## 2021-05-29 RX ADMIN — Medication 1 PATCH: at 11:29

## 2021-05-29 RX ADMIN — BUDESONIDE AND FORMOTEROL FUMARATE DIHYDRATE 2 PUFF(S): 160; 4.5 AEROSOL RESPIRATORY (INHALATION) at 18:31

## 2021-05-29 RX ADMIN — Medication 81 MILLIGRAM(S): at 11:30

## 2021-05-29 RX ADMIN — CLOPIDOGREL BISULFATE 75 MILLIGRAM(S): 75 TABLET, FILM COATED ORAL at 11:30

## 2021-05-29 RX ADMIN — ENOXAPARIN SODIUM 40 MILLIGRAM(S): 100 INJECTION SUBCUTANEOUS at 11:30

## 2021-05-29 RX ADMIN — BUDESONIDE AND FORMOTEROL FUMARATE DIHYDRATE 2 PUFF(S): 160; 4.5 AEROSOL RESPIRATORY (INHALATION) at 05:01

## 2021-05-29 RX ADMIN — Medication 1 PATCH: at 11:30

## 2021-05-29 RX ADMIN — Medication 1 PATCH: at 21:38

## 2021-05-29 RX ADMIN — TIOTROPIUM BROMIDE 1 CAPSULE(S): 18 CAPSULE ORAL; RESPIRATORY (INHALATION) at 05:00

## 2021-05-29 RX ADMIN — ATORVASTATIN CALCIUM 80 MILLIGRAM(S): 80 TABLET, FILM COATED ORAL at 21:38

## 2021-05-29 RX ADMIN — Medication 1 PATCH: at 05:02

## 2021-05-29 NOTE — PROGRESS NOTE ADULT - SUBJECTIVE AND OBJECTIVE BOX
Patient is a 71y old  Male who presents with a chief complaint of CVA, weakness (29 May 2021 11:13)      INTERVAL HPI/OVERNIGHT EVENTS:  Patient seen and examined. Doing well today. No overnight events. Sitting in chair comfortably. Reports his strength is improving. Denies chest pain, SOB, palpitations.     MEDICATIONS  (STANDING):  aspirin enteric coated 81 milliGRAM(s) Oral daily  atorvastatin 80 milliGRAM(s) Oral at bedtime  budesonide  80 MICROgram(s)/formoterol 4.5 MICROgram(s) Inhaler 2 Puff(s) Inhalation two times a day  clopidogrel Tablet 75 milliGRAM(s) Oral daily  enoxaparin Injectable 40 milliGRAM(s) SubCutaneous daily  nicotine -  14 mG/24Hr(s) Patch 1 patch Transdermal daily  tiotropium 18 MICROgram(s) Capsule 1 Capsule(s) Inhalation daily    MEDICATIONS  (PRN):  ALBUTerol    90 MICROgram(s) HFA Inhaler 2 Puff(s) Inhalation every 6 hours PRN Shortness of Breath and/or Wheezing    REVIEW OF SYSTEMS:  as per HPI    Vital Signs Last 24 Hrs  T(C): 36.5 (29 May 2021 10:02), Max: 36.6 (28 May 2021 20:29)  T(F): 97.7 (29 May 2021 10:02), Max: 97.8 (28 May 2021 20:29)  HR: 98 (29 May 2021 10:02) (71 - 98)  BP: 139/75 (29 May 2021 10:02) (125/71 - 161/80)  BP(mean): --  RR: 19 (29 May 2021 10:02) (18 - 19)  SpO2: 94% (29 May 2021 10:02) (91% - 94%)    PHYSICAL EXAM:  GENERAL: NAD  HEENT:  anicteric, moist mucous membranes  CHEST/LUNG:  CTA b/l, no rales, wheezes, or rhonchi  HEART:  RRR, S1, S2  ABDOMEN:  BS+, soft, nontender, nondistended  EXTREMITIES: no edema, cyanosis, or calf tenderness  NERVOUS SYSTEM: answers questions and follows commands appropriately, +mild left upper extremity weakness.     LABS:                        16.0   8.45  )-----------( 333      ( 29 May 2021 08:31 )             48.8     CBC Full  -  ( 29 May 2021 08:31 )  WBC Count : 8.45 K/uL  Hemoglobin : 16.0 g/dL  Hematocrit : 48.8 %  Platelet Count - Automated : 333 K/uL  Mean Cell Volume : 82.7 fl  Mean Cell Hemoglobin : 27.1 pg  Mean Cell Hemoglobin Concentration : 32.8 gm/dL  Auto Neutrophil # : x  Auto Lymphocyte # : x  Auto Monocyte # : x  Auto Eosinophil # : x  Auto Basophil # : x  Auto Neutrophil % : x  Auto Lymphocyte % : x  Auto Monocyte % : x  Auto Eosinophil % : x  Auto Basophil % : x      Ca    8.5        28 May 2021 06:50        Consultant(s) Notes Reviewed:  [x] YES  [ ] NO

## 2021-05-29 NOTE — PROGRESS NOTE ADULT - PROBLEM SELECTOR PLAN 1
-CT brain was negative for CVA and CTA head/neck was unremarkable but MRI shows a small focus of restricted diffusion within the right posterior corona radiata measuring approximately 1.5 x 0.9 cm, compatible with an acute lacunar infarct.  -Plavix 75mg daily for 90 days and ASA 81mg daily indefinitely.  Continue Lipitor.  -Has been counseled patient on lifestyle modifications including importance of quitting smoking.  PT and OT evals: recommending rehab.  A1C 6.3, Lipid panel noted, LDL 87, HDL 50, .  Fall Risk precautions.

## 2021-05-29 NOTE — PROGRESS NOTE ADULT - PROBLEM SELECTOR PLAN 5
Patient reports history of borderline diabetes, controlled with diet.  Patient with pre-diabetes based on of A1C 6.3, repeat 6.4.  Consistent carb diet.  Nutrition consult/counseling given.

## 2021-05-29 NOTE — PROGRESS NOTE ADULT - SUBJECTIVE AND OBJECTIVE BOX
Neurology follow up note    FUK [id number]      Interval History:    Patient feels ok no new complaints.    MEDICATIONS    ALBUTerol    90 MICROgram(s) HFA Inhaler 2 Puff(s) Inhalation every 6 hours PRN  aspirin enteric coated 81 milliGRAM(s) Oral daily  atorvastatin 80 milliGRAM(s) Oral at bedtime  budesonide  80 MICROgram(s)/formoterol 4.5 MICROgram(s) Inhaler 2 Puff(s) Inhalation two times a day  clopidogrel Tablet 75 milliGRAM(s) Oral daily  enoxaparin Injectable 40 milliGRAM(s) SubCutaneous daily  nicotine -  14 mG/24Hr(s) Patch 1 patch Transdermal daily  tiotropium 18 MICROgram(s) Capsule 1 Capsule(s) Inhalation daily      Allergies    No Known Allergies    Intolerances            Vital Signs Last 24 Hrs  T(C): 36.5 (29 May 2021 10:02), Max: 36.6 (28 May 2021 20:29)  T(F): 97.7 (29 May 2021 10:02), Max: 97.8 (28 May 2021 20:29)  HR: 98 (29 May 2021 10:02) (71 - 98)  BP: 139/75 (29 May 2021 10:02) (125/71 - 161/80)  BP(mean): --  RR: 19 (29 May 2021 10:02) (17 - 19)  SpO2: 94% (29 May 2021 10:02) (91% - 98%)    REVIEW OF SYSTEMS:  Constitutional:  The patient denies fever, chills, or night sweats.  Head:  No headaches.  Eyes:  No double vision or blurry vision.  Ears:  No ringing in the ears.  Neck:  No neck pain.  Cardiovascular:  No chest pain.  Respiratory:  No shortness of breath.  Abdomen:  No nausea, vomiting, or abdominal pain.  Extremities/Neurological:  No numbness or tingling.  Musculoskeletal:  No joint pain.      PHYSICAL EXAMINATION:    HEENT:  Head:  Normocephalic, atraumatic.  Eyes:  No scleral icterus.  Ears:  Hearing bilaterally intact.  NECK:  Supple..  CARDIOVASCULAR:  S1 and S2 heard.  RESPIRATORY:  Good air entry bilaterally.  ABDOMEN:  Soft, nontender.  EXTREMITIES:  No clubbing or cyanosis were noted.      NEUROLOGIC:  The patient is awake, alert,   Extraocular movements were intact.  Pupils were equal, round, and reactive bilaterally 3 mm to 2 mm.  Speech was less dysarthria noted today.  Smile was less slight left drop.  Motor:  Right upper and right lower were 5/5, left upper was able to elevate off bed  Overall strength was 4/5 bi/triceps hand grasp.   Left lower extremity was 4/5.  Sensory:  Bilateral upper and lower intact to light touch.                      LABS:  CBC Full  -  ( 29 May 2021 08:31 )  WBC Count : 8.45 K/uL  RBC Count : 5.90 M/uL  Hemoglobin : 16.0 g/dL  Hematocrit : 48.8 %  Platelet Count - Automated : 333 K/uL  Mean Cell Volume : 82.7 fl  Mean Cell Hemoglobin : 27.1 pg  Mean Cell Hemoglobin Concentration : 32.8 gm/dL  Auto Neutrophil # : x  Auto Lymphocyte # : x  Auto Monocyte # : x  Auto Eosinophil # : x  Auto Basophil # : x  Auto Neutrophil % : x  Auto Lymphocyte % : x  Auto Monocyte % : x  Auto Eosinophil % : x  Auto Basophil % : x      05-28    143  |  109<H>  |  14  ----------------------------<  111<H>  4.0   |  27  |  0.73    Ca    8.5      28 May 2021 06:50      Hemoglobin A1C:       Vitamin B12         RADIOLOGY    ANALYSIS AND PLAN:  This is a 71-year-old with left-sided hemiparesis.  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  What could be calculated from the NIH stroke scale would be 4.  The patient would not be a TPA candidate secondary to timeframe of symptoms, and at present etiology of cerebrovascular accident unknown.  Telemetry evaluation.  MRI imaging of the brain noted positive CVA.  High-dose statin.  Physical therapy evaluation.  will change asa 81 and plavix 75 and after total of 90 days ok to dc plavix   cleared only form neurology for dc     Son's name is John, his telephone number is [phone]  5/29/2021 spoke to rafael today     Greater than 40 minutes of time was spent with the patient, plan of care, reviewing data, with greater than 50% of the visit was spent counseling and/or coordinating care with multidisciplinary healthcare team

## 2021-05-29 NOTE — PROGRESS NOTE ADULT - PROBLEM SELECTOR PLAN 4
-Chantix initially held due to strict NPO status.  -Continue nicotine patch 14mg/24 hr transdermal daily.   -Discharge on 14mg/24 hr patch for 6 weeks total then 7mg/24 hr patch for two more weeks.

## 2021-05-29 NOTE — PROGRESS NOTE ADULT - PROBLEM SELECTOR PLAN 3
Current Smoker 1/2 pack/day > 50 years.  Has been advised to stop smoking.l  Will order Spiriva and Symbicort in place of home inhaler medication.  Continue albuterol as needed for SOB/Wheezing.

## 2021-05-30 LAB — SARS-COV-2 RNA SPEC QL NAA+PROBE: SIGNIFICANT CHANGE UP

## 2021-05-30 PROCEDURE — 99233 SBSQ HOSP IP/OBS HIGH 50: CPT

## 2021-05-30 PROCEDURE — 71250 CT THORAX DX C-: CPT | Mod: 26

## 2021-05-30 RX ADMIN — TIOTROPIUM BROMIDE 1 CAPSULE(S): 18 CAPSULE ORAL; RESPIRATORY (INHALATION) at 05:18

## 2021-05-30 RX ADMIN — Medication 1 PATCH: at 07:00

## 2021-05-30 RX ADMIN — Medication 1 PATCH: at 20:02

## 2021-05-30 RX ADMIN — BUDESONIDE AND FORMOTEROL FUMARATE DIHYDRATE 2 PUFF(S): 160; 4.5 AEROSOL RESPIRATORY (INHALATION) at 05:18

## 2021-05-30 RX ADMIN — ATORVASTATIN CALCIUM 80 MILLIGRAM(S): 80 TABLET, FILM COATED ORAL at 21:16

## 2021-05-30 RX ADMIN — ENOXAPARIN SODIUM 40 MILLIGRAM(S): 100 INJECTION SUBCUTANEOUS at 11:59

## 2021-05-30 RX ADMIN — Medication 81 MILLIGRAM(S): at 12:00

## 2021-05-30 RX ADMIN — CLOPIDOGREL BISULFATE 75 MILLIGRAM(S): 75 TABLET, FILM COATED ORAL at 12:00

## 2021-05-30 RX ADMIN — Medication 1 PATCH: at 11:57

## 2021-05-30 RX ADMIN — BUDESONIDE AND FORMOTEROL FUMARATE DIHYDRATE 2 PUFF(S): 160; 4.5 AEROSOL RESPIRATORY (INHALATION) at 21:17

## 2021-05-30 RX ADMIN — Medication 1 PATCH: at 12:00

## 2021-05-30 NOTE — PROGRESS NOTE ADULT - TIME BILLING
direct patient care including but not limited to reviewing chart, medications ,laboratory data, imaging reports, review of consultant recommendations, coordination of care with multidisciplinary team involved in the case and discussion of plan with patient. direct patient care including but not limited to reviewing chart, medications ,laboratory data, imaging reports, review of consultant recommendations, coordination of care with multidisciplinary team involved in the case and discussion of plan with patient.  Smoking cessation counseling done

## 2021-05-30 NOTE — PROGRESS NOTE ADULT - PROBLEM SELECTOR PLAN 5
Patient reports history of borderline diabetes, controlled with diet.  Patient with pre-diabetes based on of A1C 6.3, repeat 6.4.  Consistent carb diet.  Nutrition consult/counseling given. Patient reports history of borderline diabetes, controlled with diet.  Patient with pre-diabetes based on of A1C 6.3, repeat 6.4.  Consistent carb diet.  Smoking cessation counselling down

## 2021-05-30 NOTE — PROGRESS NOTE ADULT - SUBJECTIVE AND OBJECTIVE BOX
MEDICAL ATTENDING NOTE    Patient is a 71y old  Male who presents with a chief complaint of CVA, weakness (29 May 2021 15:37)      INTERVAL HPI/OVERNIGHT EVENTS: offers no new complaints today    MEDICATIONS  (STANDING):  aspirin enteric coated 81 milliGRAM(s) Oral daily  atorvastatin 80 milliGRAM(s) Oral at bedtime  budesonide  80 MICROgram(s)/formoterol 4.5 MICROgram(s) Inhaler 2 Puff(s) Inhalation two times a day  clopidogrel Tablet 75 milliGRAM(s) Oral daily  enoxaparin Injectable 40 milliGRAM(s) SubCutaneous daily  nicotine -  14 mG/24Hr(s) Patch 1 patch Transdermal daily  tiotropium 18 MICROgram(s) Capsule 1 Capsule(s) Inhalation daily    MEDICATIONS  (PRN):  ALBUTerol    90 MICROgram(s) HFA Inhaler 2 Puff(s) Inhalation every 6 hours PRN Shortness of Breath and/or Wheezing      __________________________________________________  ----------------------------------------------------------------------------------  REVIEW OF SYSTEMS: negative      Vital Signs Last 24 Hrs  T(C): 37.2 (30 May 2021 04:55), Max: 37.2 (30 May 2021 04:55)  T(F): 98.9 (30 May 2021 04:55), Max: 98.9 (30 May 2021 04:55)  HR: 107 (30 May 2021 04:55) (98 - 107)  BP: 144/87 (30 May 2021 04:55) (139/75 - 149/70)  BP(mean): --  RR: 18 (30 May 2021 04:55) (17 - 19)  SpO2: 96% (30 May 2021 04:55) (94% - 96%)    _________________  PHYSICAL EXAM:  ---------------------------   NAD; Normocephalic;   LUNGS - no wheezing  HEART: S1 S2+   ABDOMEN: Soft, Nontender, non distended, BS+  EXTREMITIES: no cyanosis; no edema  NERVOUS SYSTEM:  Awake and alert; no focal neuro deficits appreciated    _________________________________________________  LABS:                        16.0   8.45  )-----------( 333      ( 29 May 2021 08:31 )             48.8               CAPILLARY BLOOD GLUCOSE                    Plan of care was discussed with patient ; all questions and concerns were addressed and care was aligned with patient's wishes.             MEDICAL ATTENDING NOTE    Patient is a 71y old  Male who presents with a chief complaint of CVA, weakness (29 May 2021 15:37)      INTERVAL HPI/OVERNIGHT EVENTS: offers no new complaints today    MEDICATIONS  (STANDING):  aspirin enteric coated 81 milliGRAM(s) Oral daily  atorvastatin 80 milliGRAM(s) Oral at bedtime  budesonide  80 MICROgram(s)/formoterol 4.5 MICROgram(s) Inhaler 2 Puff(s) Inhalation two times a day  clopidogrel Tablet 75 milliGRAM(s) Oral daily  enoxaparin Injectable 40 milliGRAM(s) SubCutaneous daily  nicotine -  14 mG/24Hr(s) Patch 1 patch Transdermal daily  tiotropium 18 MICROgram(s) Capsule 1 Capsule(s) Inhalation daily    MEDICATIONS  (PRN):  ALBUTerol    90 MICROgram(s) HFA Inhaler 2 Puff(s) Inhalation every 6 hours PRN Shortness of Breath and/or Wheezing      __________________________________________________  ----------------------------------------------------------------------------------  REVIEW OF SYSTEMS: negative for fever and HA or CP      Vital Signs Last 24 Hrs  T(C): 37.2 (30 May 2021 04:55), Max: 37.2 (30 May 2021 04:55)  T(F): 98.9 (30 May 2021 04:55), Max: 98.9 (30 May 2021 04:55)  HR: 107 (30 May 2021 04:55) (98 - 107)  BP: 144/87 (30 May 2021 04:55) (139/75 - 149/70)  RR: 18 (30 May 2021 04:55) (17 - 19)  SpO2: 96% (30 May 2021 04:55) (94% - 96%)    _________________  PHYSICAL EXAM:  ---------------------------   NAD; Normocephalic;   LUNGS - no wheezing  HEART: S1 S2+   ABDOMEN: Soft, Nontender, non distended, BS+  EXTREMITIES: no cyanosis; no edema  NERVOUS SYSTEM:  Awake and alert; no focal neuro deficits appreciated    _________________________________________________  LABS:                        16.0   8.45  )-----------( 333      ( 29 May 2021 08:31 )             48.8               CAPILLARY BLOOD GLUCOSE                    Plan of care was discussed with patient ; all questions and concerns were addressed and care was aligned with patient's wishes.             MEDICAL ATTENDING NOTE    Patient is a 71y old  Male who presents with a chief complaint of CVA, weakness (29 May 2021 15:37)      INTERVAL HPI/OVERNIGHT EVENTS: offers no new complaints today    MEDICATIONS  (STANDING):  aspirin enteric coated 81 milliGRAM(s) Oral daily  atorvastatin 80 milliGRAM(s) Oral at bedtime  budesonide  80 MICROgram(s)/formoterol 4.5 MICROgram(s) Inhaler 2 Puff(s) Inhalation two times a day  clopidogrel Tablet 75 milliGRAM(s) Oral daily  enoxaparin Injectable 40 milliGRAM(s) SubCutaneous daily  nicotine -  14 mG/24Hr(s) Patch 1 patch Transdermal daily  tiotropium 18 MICROgram(s) Capsule 1 Capsule(s) Inhalation daily    MEDICATIONS  (PRN):  ALBUTerol    90 MICROgram(s) HFA Inhaler 2 Puff(s) Inhalation every 6 hours PRN Shortness of Breath and/or Wheezing      __________________________________________________  ----------------------------------------------------------------------------------  REVIEW OF SYSTEMS: negative for fever and HA or CP      Vital Signs Last 24 Hrs  T(C): 37.2 (30 May 2021 04:55), Max: 37.2 (30 May 2021 04:55)  T(F): 98.9 (30 May 2021 04:55), Max: 98.9 (30 May 2021 04:55)  HR: 107 (30 May 2021 04:55) (98 - 107)  BP: 144/87 (30 May 2021 04:55) (139/75 - 149/70)  RR: 18 (30 May 2021 04:55) (17 - 19)  SpO2: 96% (30 May 2021 04:55) (94% - 96%)    _________________  PHYSICAL EXAM:  ---------------------------   NAD; Normocephalic;   LUNGS - no wheezing  HEART: S1 S2+   ABDOMEN: Soft, Nontender, non distended, BS+  EXTREMITIES: no cyanosis; no edema  NERVOUS SYSTEM:  Awake and alert; left hemiparesis+    _________________________________________________  LABS:                        16.0   8.45  )-----------( 333      ( 29 May 2021 08:31 )             48.8               CAPILLARY BLOOD GLUCOSE                    Plan of care was discussed with patient ; all questions and concerns were addressed and care was aligned with patient's wishes.

## 2021-05-30 NOTE — PROGRESS NOTE ADULT - PROBLEM SELECTOR PLAN 6
Lovenox 40mg SC daily for DVT prophylaxis. Continue Lovenox subcutaneous injection for DVT prophylaxis

## 2021-05-30 NOTE — PROGRESS NOTE ADULT - PROBLEM SELECTOR PLAN 3
Current Smoker 1/2 pack/day > 50 years.  Has been advised to stop smoking.  Will order Spiriva and Symbicort in place of home inhaler medication.  Continue albuterol as needed for SOB/Wheezing.

## 2021-05-30 NOTE — PROGRESS NOTE ADULT - SUBJECTIVE AND OBJECTIVE BOX
Neurology follow up note    FUK [id number]      Interval History:    Patient feels ok no new complaints.    MEDICATIONS    ALBUTerol    90 MICROgram(s) HFA Inhaler 2 Puff(s) Inhalation every 6 hours PRN  aspirin enteric coated 81 milliGRAM(s) Oral daily  atorvastatin 80 milliGRAM(s) Oral at bedtime  budesonide  80 MICROgram(s)/formoterol 4.5 MICROgram(s) Inhaler 2 Puff(s) Inhalation two times a day  clopidogrel Tablet 75 milliGRAM(s) Oral daily  enoxaparin Injectable 40 milliGRAM(s) SubCutaneous daily  nicotine -  14 mG/24Hr(s) Patch 1 patch Transdermal daily  tiotropium 18 MICROgram(s) Capsule 1 Capsule(s) Inhalation daily      Allergies    No Known Allergies    Intolerances            Vital Signs Last 24 Hrs  T(C): 37.2 (30 May 2021 04:55), Max: 37.2 (30 May 2021 04:55)  T(F): 98.9 (30 May 2021 04:55), Max: 98.9 (30 May 2021 04:55)  HR: 107 (30 May 2021 04:55) (98 - 107)  BP: 144/87 (30 May 2021 04:55) (144/87 - 149/70)  BP(mean): --  RR: 18 (30 May 2021 04:55) (17 - 18)  SpO2: 96% (30 May 2021 04:55) (95% - 96%)      REVIEW OF SYSTEMS:  Constitutional:  The patient denies fever, chills, or night sweats.  Head:  No headaches.  Eyes:  No double vision or blurry vision.  Ears:  No ringing in the ears.  Neck:  No neck pain.  Cardiovascular:  No chest pain.  Respiratory:  No shortness of breath.  Abdomen:  No nausea, vomiting, or abdominal pain.  Extremities/Neurological:  No numbness or tingling.  Musculoskeletal:  No joint pain.      PHYSICAL EXAMINATION:    HEENT:  Head:  Normocephalic, atraumatic.  Eyes:  No scleral icterus.  Ears:  Hearing bilaterally intact.  NECK:  Supple..  CARDIOVASCULAR:  S1 and S2 heard.  RESPIRATORY:  Good air entry bilaterally.  ABDOMEN:  Soft, nontender.  EXTREMITIES:  No clubbing or cyanosis were noted.      NEUROLOGIC:  The patient is awake, alert,   Extraocular movements were intact.  Pupils were equal, round, and reactive bilaterally 3 mm to 2 mm.  Speech was less dysarthria noted today.  Smile was less slight left drop.  Motor:  Right upper and right lower were 5/5, left upper was able to elevate off bed  Overall strength was 4/5 bi/triceps hand grasp.   Left lower extremity was 4/5.  Sensory:  Bilateral upper and lower intact to light touch.     LABS:  CBC Full  -  ( 29 May 2021 08:31 )  WBC Count : 8.45 K/uL  RBC Count : 5.90 M/uL  Hemoglobin : 16.0 g/dL  Hematocrit : 48.8 %  Platelet Count - Automated : 333 K/uL  Mean Cell Volume : 82.7 fl  Mean Cell Hemoglobin : 27.1 pg  Mean Cell Hemoglobin Concentration : 32.8 gm/dL  Auto Neutrophil # : x  Auto Lymphocyte # : x  Auto Monocyte # : x  Auto Eosinophil # : x  Auto Basophil # : x  Auto Neutrophil % : x  Auto Lymphocyte % : x  Auto Monocyte % : x  Auto Eosinophil % : x  Auto Basophil % : x            Hemoglobin A1C:       Vitamin B12         RADIOLOGY    ANALYSIS AND PLAN:  This is a 71-year-old with left-sided hemiparesis.  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  What could be calculated from the NIH stroke scale would be 4.  The patient would not be a TPA candidate secondary to timeframe of symptoms, and at present etiology of cerebrovascular accident unknown.  Telemetry evaluation.  MRI imaging of the brain noted positive CVA.  High-dose statin.  Physical therapy evaluation.  will change asa 81 and plavix 75 and after total of 90 days ok to dc plavix   cleared only form neurology for dc     Son's name is John, his telephone number is [phone]  5/29/2021 spoke to rafael today 5/30/2021    Greater than 40 minutes of time was spent with the patient, plan of care, reviewing data, with greater than 50% of the visit was spent counseling and/or coordinating care with multidisciplinary healthcare team

## 2021-05-30 NOTE — PROGRESS NOTE ADULT - PROBLEM SELECTOR PLAN 1
MRI shows a small focus of restricted diffusion within the right posterior corona radiata measuring approximately 1.5 x 0.9 cm, compatible with an acute lacunar infarct.  -Plavix 75mg daily for 90 days and ASA 81mg daily indefinitely.  Continue Lipitor.  -Has been counseled patient on lifestyle modifications including importance of quitting smoking.  PT and OT evals: recommending rehab.  A1C 6.3, Lipid panel noted, LDL 87, HDL 50, .  Fall Risk precautions. MRI shows a small focus of restricted diffusion within the right posterior corona radiata measuring approximately 1.5 x 0.9 cm, compatible with an acute lacunar infarct.  Continue Plavix 75mg daily for 90 days and ASA 81mg daily indefinitely.  Continue Lipitor.  -Has been counseled patient on lifestyle modifications including importance of quitting smoking.  PT and OT evals: recommending rehab.  A1C 6.3, Lipid panel noted, LDL 87, HDL 50, .  Fall Risk precautions.

## 2021-05-30 NOTE — PROGRESS NOTE ADULT - PROBLEM SELECTOR PLAN 2
MBSS done, recommended for soft diet with nectar-thick liquids.  Follow up with S&S outpatient. MBS done, recommended for soft diet with nectar-thick liquids.  Follow up with S&S outpatient.

## 2021-05-31 LAB
ANION GAP SERPL CALC-SCNC: 9 MMOL/L — SIGNIFICANT CHANGE UP (ref 5–17)
BUN SERPL-MCNC: 22 MG/DL — SIGNIFICANT CHANGE UP (ref 7–23)
CALCIUM SERPL-MCNC: 8.9 MG/DL — SIGNIFICANT CHANGE UP (ref 8.5–10.1)
CHLORIDE SERPL-SCNC: 107 MMOL/L — SIGNIFICANT CHANGE UP (ref 96–108)
CO2 SERPL-SCNC: 26 MMOL/L — SIGNIFICANT CHANGE UP (ref 22–31)
CREAT SERPL-MCNC: 0.7 MG/DL — SIGNIFICANT CHANGE UP (ref 0.5–1.3)
GLUCOSE SERPL-MCNC: 115 MG/DL — HIGH (ref 70–99)
HCT VFR BLD CALC: 51.8 % — HIGH (ref 39–50)
HGB BLD-MCNC: 16.4 G/DL — SIGNIFICANT CHANGE UP (ref 13–17)
MCHC RBC-ENTMCNC: 26.6 PG — LOW (ref 27–34)
MCHC RBC-ENTMCNC: 31.7 GM/DL — LOW (ref 32–36)
MCV RBC AUTO: 84.1 FL — SIGNIFICANT CHANGE UP (ref 80–100)
NRBC # BLD: 0 /100 WBCS — SIGNIFICANT CHANGE UP (ref 0–0)
PLATELET # BLD AUTO: 351 K/UL — SIGNIFICANT CHANGE UP (ref 150–400)
POTASSIUM SERPL-MCNC: 4.1 MMOL/L — SIGNIFICANT CHANGE UP (ref 3.5–5.3)
POTASSIUM SERPL-SCNC: 4.1 MMOL/L — SIGNIFICANT CHANGE UP (ref 3.5–5.3)
RBC # BLD: 6.16 M/UL — HIGH (ref 4.2–5.8)
RBC # FLD: 13.8 % — SIGNIFICANT CHANGE UP (ref 10.3–14.5)
SODIUM SERPL-SCNC: 142 MMOL/L — SIGNIFICANT CHANGE UP (ref 135–145)
WBC # BLD: 14.16 K/UL — HIGH (ref 3.8–10.5)
WBC # FLD AUTO: 14.16 K/UL — HIGH (ref 3.8–10.5)

## 2021-05-31 PROCEDURE — 99232 SBSQ HOSP IP/OBS MODERATE 35: CPT

## 2021-05-31 RX ADMIN — BUDESONIDE AND FORMOTEROL FUMARATE DIHYDRATE 2 PUFF(S): 160; 4.5 AEROSOL RESPIRATORY (INHALATION) at 22:04

## 2021-05-31 RX ADMIN — ATORVASTATIN CALCIUM 80 MILLIGRAM(S): 80 TABLET, FILM COATED ORAL at 22:04

## 2021-05-31 RX ADMIN — Medication 1 PATCH: at 12:08

## 2021-05-31 RX ADMIN — Medication 81 MILLIGRAM(S): at 12:09

## 2021-05-31 RX ADMIN — Medication 1 PATCH: at 20:04

## 2021-05-31 RX ADMIN — CLOPIDOGREL BISULFATE 75 MILLIGRAM(S): 75 TABLET, FILM COATED ORAL at 12:09

## 2021-05-31 RX ADMIN — ENOXAPARIN SODIUM 40 MILLIGRAM(S): 100 INJECTION SUBCUTANEOUS at 12:08

## 2021-05-31 RX ADMIN — TIOTROPIUM BROMIDE 1 CAPSULE(S): 18 CAPSULE ORAL; RESPIRATORY (INHALATION) at 05:15

## 2021-05-31 RX ADMIN — Medication 1 PATCH: at 10:53

## 2021-05-31 RX ADMIN — BUDESONIDE AND FORMOTEROL FUMARATE DIHYDRATE 2 PUFF(S): 160; 4.5 AEROSOL RESPIRATORY (INHALATION) at 05:15

## 2021-05-31 NOTE — PROGRESS NOTE ADULT - SUBJECTIVE AND OBJECTIVE BOX
Neurology follow up note    FUK [id number]      Interval History:    Patient feels ok no new complaints.    MEDICATIONS    ALBUTerol    90 MICROgram(s) HFA Inhaler 2 Puff(s) Inhalation every 6 hours PRN  aspirin enteric coated 81 milliGRAM(s) Oral daily  atorvastatin 80 milliGRAM(s) Oral at bedtime  budesonide  80 MICROgram(s)/formoterol 4.5 MICROgram(s) Inhaler 2 Puff(s) Inhalation two times a day  clopidogrel Tablet 75 milliGRAM(s) Oral daily  enoxaparin Injectable 40 milliGRAM(s) SubCutaneous daily  nicotine -  14 mG/24Hr(s) Patch 1 patch Transdermal daily  tiotropium 18 MICROgram(s) Capsule 1 Capsule(s) Inhalation daily      Allergies    No Known Allergies    Intolerances            Vital Signs Last 24 Hrs  T(C): 36.7 (31 May 2021 04:30), Max: 37.2 (30 May 2021 20:01)  T(F): 98 (31 May 2021 04:30), Max: 99 (30 May 2021 20:01)  HR: 83 (31 May 2021 04:30) (83 - 100)  BP: 149/78 (31 May 2021 04:30) (135/83 - 149/78)  BP(mean): --  RR: 18 (31 May 2021 04:30) (16 - 18)  SpO2: 93% (31 May 2021 04:30) (93% - 94%)      REVIEW OF SYSTEMS:  Constitutional:  The patient denies fever, chills, or night sweats.  Head:  No headaches.  Eyes:  No double vision or blurry vision.  Ears:  No ringing in the ears.  Neck:  No neck pain.  Cardiovascular:  No chest pain.  Respiratory:  No shortness of breath.  Abdomen:  No nausea, vomiting, or abdominal pain.  Extremities/Neurological:  No numbness or tingling.  Musculoskeletal:  No joint pain.      PHYSICAL EXAMINATION:    HEENT:  Head:  Normocephalic, atraumatic.  Eyes:  No scleral icterus.  Ears:  Hearing bilaterally intact.  NECK:  Supple..  CARDIOVASCULAR:  S1 and S2 heard.  RESPIRATORY:  Good air entry bilaterally.  ABDOMEN:  Soft, nontender.  EXTREMITIES:  No clubbing or cyanosis were noted.      NEUROLOGIC:  The patient is awake, alert,   Extraocular movements were intact.  Pupils were equal, round, and reactive bilaterally 3 mm to 2 mm.  Speech was less dysarthria noted today.  Smile was less slight left drop.  Motor:  Right upper and right lower were 5/5, left upper was able to elevate off bed  Overall strength was 4/5 bi/triceps hand grasp.   Left lower extremity was 4/5.  Sensory:  Bilateral upper and lower intact to light touch.                 LABS:  CBC Full  -  ( 31 May 2021 06:43 )  WBC Count : 14.16 K/uL  RBC Count : 6.16 M/uL  Hemoglobin : 16.4 g/dL  Hematocrit : 51.8 %  Platelet Count - Automated : 351 K/uL  Mean Cell Volume : 84.1 fl  Mean Cell Hemoglobin : 26.6 pg  Mean Cell Hemoglobin Concentration : 31.7 gm/dL  Auto Neutrophil # : x  Auto Lymphocyte # : x  Auto Monocyte # : x  Auto Eosinophil # : x  Auto Basophil # : x  Auto Neutrophil % : x  Auto Lymphocyte % : x  Auto Monocyte % : x  Auto Eosinophil % : x  Auto Basophil % : x      05-31    142  |  107  |  22  ----------------------------<  115<H>  4.1   |  26  |  0.70    Ca    8.9      31 May 2021 06:43      Hemoglobin A1C:       Vitamin B12         RADIOLOGY      ANALYSIS AND PLAN:  This is a 71-year-old with left-sided hemiparesis.  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  What could be calculated from the NIH stroke scale would be 4.  The patient would not be a TPA candidate secondary to timeframe of symptoms, and at present etiology of cerebrovascular accident unknown.  Telemetry evaluation.  MRI imaging of the brain noted positive CVA.  High-dose statin.  Physical therapy evaluation.  will change asa 81 and plavix 75 and after total of 90 days ok to dc plavix   cleared only form neurology for dc     Son's name is John, his telephone number is [phone]  5/29/2021 spoke to rafael 5/30/2021    Greater than 34 minutes of time was spent with the patient, plan of care, reviewing data, with greater than 50% of the visit was spent counseling and/or coordinating care with multidisciplinary healthcare team

## 2021-05-31 NOTE — PROGRESS NOTE ADULT - SUBJECTIVE AND OBJECTIVE BOX
MEDICAL ATTENDING NOTE    Patient is a 71y old  Male who presents with a chief complaint of CVA, weakness (30 May 2021 11:44)      INTERVAL HPI/OVERNIGHT EVENTS: offers no new complaints today    MEDICATIONS  (STANDING):  aspirin enteric coated 81 milliGRAM(s) Oral daily  atorvastatin 80 milliGRAM(s) Oral at bedtime  budesonide  80 MICROgram(s)/formoterol 4.5 MICROgram(s) Inhaler 2 Puff(s) Inhalation two times a day  clopidogrel Tablet 75 milliGRAM(s) Oral daily  enoxaparin Injectable 40 milliGRAM(s) SubCutaneous daily  nicotine -  14 mG/24Hr(s) Patch 1 patch Transdermal daily  tiotropium 18 MICROgram(s) Capsule 1 Capsule(s) Inhalation daily    MEDICATIONS  (PRN):  ALBUTerol    90 MICROgram(s) HFA Inhaler 2 Puff(s) Inhalation every 6 hours PRN Shortness of Breath and/or Wheezing      __________________________________________________  ----------------------------------------------------------------------------------  REVIEW OF SYSTEMS: negative      Vital Signs Last 24 Hrs  T(C): 36.7 (31 May 2021 04:30), Max: 37.2 (30 May 2021 20:01)  T(F): 98 (31 May 2021 04:30), Max: 99 (30 May 2021 20:01)  HR: 83 (31 May 2021 04:30) (83 - 100)  BP: 149/78 (31 May 2021 04:30) (135/83 - 149/78)  BP(mean): --  RR: 18 (31 May 2021 04:30) (16 - 18)  SpO2: 93% (31 May 2021 04:30) (93% - 94%)    _________________  PHYSICAL EXAM:  ---------------------------   NAD; Normocephalic;   LUNGS - no wheezing  HEART: S1 S2+   ABDOMEN: Soft, Nontender, non distended  EXTREMITIES: no cyanosis; no edema  NERVOUS SYSTEM:  Awake and alert; no focal neuro deficits appreciated    _________________________________________________  LABS:                        16.4   14.16 )-----------( 351      ( 31 May 2021 06:43 )             51.8     05-31    142  |  107  |  22  ----------------------------<  115<H>  4.1   |  26  |  0.70    Ca    8.9      31 May 2021 06:43          CAPILLARY BLOOD GLUCOSE      POCT Blood Glucose.: 109 mg/dL (30 May 2021 16:59)                Plan of care was discussed with patient ; all questions and concerns were addressed and care was aligned with patient's wishes.             MEDICAL ATTENDING NOTE    Patient is a 71y old  Male who presents with a chief complaint of CVA, weakness (30 May 2021 11:44)      INTERVAL HPI/OVERNIGHT EVENTS: offers no new complaints today    MEDICATIONS  (STANDING):  aspirin enteric coated 81 milliGRAM(s) Oral daily  atorvastatin 80 milliGRAM(s) Oral at bedtime  budesonide  80 MICROgram(s)/formoterol 4.5 MICROgram(s) Inhaler 2 Puff(s) Inhalation two times a day  clopidogrel Tablet 75 milliGRAM(s) Oral daily  enoxaparin Injectable 40 milliGRAM(s) SubCutaneous daily  nicotine -  14 mG/24Hr(s) Patch 1 patch Transdermal daily  tiotropium 18 MICROgram(s) Capsule 1 Capsule(s) Inhalation daily    MEDICATIONS  (PRN):  ALBUTerol    90 MICROgram(s) HFA Inhaler 2 Puff(s) Inhalation every 6 hours PRN Shortness of Breath and/or Wheezing      __________________________________________________  ----------------------------------------------------------------------------------  REVIEW OF SYSTEMS: negative      Vital Signs Last 24 Hrs  T(C): 36.7 (31 May 2021 04:30), Max: 37.2 (30 May 2021 20:01)  T(F): 98 (31 May 2021 04:30), Max: 99 (30 May 2021 20:01)  HR: 83 (31 May 2021 04:30) (83 - 100)  BP: 149/78 (31 May 2021 04:30) (135/83 - 149/78)  RR: 18 (31 May 2021 04:30) (16 - 18)  SpO2: 93% (31 May 2021 04:30) (93% - 94%)    _________________  PHYSICAL EXAM:  ---------------------------   NAD; Normocephalic;   LUNGS - no wheezing  HEART: S1 S2+   ABDOMEN: Soft, Nontender, non distended, BS+  EXTREMITIES: no cyanosis; no edema  NERVOUS SYSTEM:  Awake and alert; left hemiparesis    _________________________________________________  LABS:                        16.4   14.16 )-----------( 351      ( 31 May 2021 06:43 )             51.8     05-31    142  |  107  |  22  ----------------------------<  115<H>  4.1   |  26  |  0.70    Ca    8.9      31 May 2021 06:43          CAPILLARY BLOOD GLUCOSE      POCT Blood Glucose.: 109 mg/dL (30 May 2021 16:59)                Plan of care was discussed with patient ; all questions and concerns were addressed and care was aligned with patient's wishes.

## 2021-05-31 NOTE — PROGRESS NOTE ADULT - PROBLEM SELECTOR PLAN 2
MBS done, recommended for soft diet with nectar-thick liquids.  Follow up with S&S outpatient. MBS done, recommended for soft diet with nectar-thick liquids.

## 2021-05-31 NOTE — PROGRESS NOTE ADULT - PROBLEM SELECTOR PLAN 1
MRI shows a small focus of restricted diffusion within the right posterior corona radiata measuring approximately 1.5 x 0.9 cm, compatible with an acute lacunar infarct.  Continue Plavix 75mg daily for 90 days and ASA 81mg daily indefinitely.  Continue Lipitor.  -Has been counseled patient on lifestyle modifications including importance of quitting smoking.  PT and OT evals: recommending rehab.  A1C 6.3, Lipid panel noted, LDL 87, HDL 50, .  Fall Risk precautions. Continue Plavix 75mg daily for 90 days and ASA 81mg daily indefinitely.    Continue Lipitor.  PT as tolerated  Discharge planning - MITESH

## 2021-05-31 NOTE — PROGRESS NOTE ADULT - PROBLEM SELECTOR PLAN 5
Patient reports history of borderline diabetes, controlled with diet.  Patient with pre-diabetes based on of A1C 6.3, repeat 6.4.  Consistent carb diet.  Smoking cessation counselling down

## 2021-05-31 NOTE — PROGRESS NOTE ADULT - PROBLEM SELECTOR PLAN 3
Current Smoker 1/2 pack/day > 50 years.  Has been advised to stop smoking.  Will order Spiriva and Symbicort in place of home inhaler medication.  Continue albuterol as needed for SOB/Wheezing. CT chest reveiwed.  Smoking cessation counselling done

## 2021-06-01 LAB
HCT VFR BLD CALC: 48.9 % — SIGNIFICANT CHANGE UP (ref 39–50)
HGB BLD-MCNC: 15.6 G/DL — SIGNIFICANT CHANGE UP (ref 13–17)
MCHC RBC-ENTMCNC: 26.9 PG — LOW (ref 27–34)
MCHC RBC-ENTMCNC: 31.9 GM/DL — LOW (ref 32–36)
MCV RBC AUTO: 84.3 FL — SIGNIFICANT CHANGE UP (ref 80–100)
NRBC # BLD: 0 /100 WBCS — SIGNIFICANT CHANGE UP (ref 0–0)
PLATELET # BLD AUTO: 332 K/UL — SIGNIFICANT CHANGE UP (ref 150–400)
RBC # BLD: 5.8 M/UL — SIGNIFICANT CHANGE UP (ref 4.2–5.8)
RBC # FLD: 13.6 % — SIGNIFICANT CHANGE UP (ref 10.3–14.5)
WBC # BLD: 10.22 K/UL — SIGNIFICANT CHANGE UP (ref 3.8–10.5)
WBC # FLD AUTO: 10.22 K/UL — SIGNIFICANT CHANGE UP (ref 3.8–10.5)

## 2021-06-01 PROCEDURE — 99232 SBSQ HOSP IP/OBS MODERATE 35: CPT

## 2021-06-01 RX ADMIN — Medication 1 PATCH: at 12:26

## 2021-06-01 RX ADMIN — TIOTROPIUM BROMIDE 1 CAPSULE(S): 18 CAPSULE ORAL; RESPIRATORY (INHALATION) at 05:30

## 2021-06-01 RX ADMIN — Medication 1 PATCH: at 11:56

## 2021-06-01 RX ADMIN — BUDESONIDE AND FORMOTEROL FUMARATE DIHYDRATE 2 PUFF(S): 160; 4.5 AEROSOL RESPIRATORY (INHALATION) at 21:31

## 2021-06-01 RX ADMIN — ATORVASTATIN CALCIUM 80 MILLIGRAM(S): 80 TABLET, FILM COATED ORAL at 21:30

## 2021-06-01 RX ADMIN — BUDESONIDE AND FORMOTEROL FUMARATE DIHYDRATE 2 PUFF(S): 160; 4.5 AEROSOL RESPIRATORY (INHALATION) at 05:31

## 2021-06-01 RX ADMIN — ENOXAPARIN SODIUM 40 MILLIGRAM(S): 100 INJECTION SUBCUTANEOUS at 11:55

## 2021-06-01 RX ADMIN — CLOPIDOGREL BISULFATE 75 MILLIGRAM(S): 75 TABLET, FILM COATED ORAL at 11:55

## 2021-06-01 RX ADMIN — Medication 81 MILLIGRAM(S): at 11:55

## 2021-06-01 RX ADMIN — Medication 1 PATCH: at 20:38

## 2021-06-01 NOTE — PROGRESS NOTE ADULT - PROBLEM SELECTOR PLAN 2
MBS done, recommended for soft diet with nectar-thick liquids.  Follow up with S&S outpatient. Continue soft diet with nectar-thick liquids.  Aspiration precautions to be maintained

## 2021-06-01 NOTE — PROGRESS NOTE ADULT - PROBLEM SELECTOR PLAN 7
Case d/w SW, hopeful for discharge to rehab on 6/1.  Ordered Covid PCR for Sunday 5/30 in anticipation of discharge on 6/1. Stable for discharge- Pending rehab bed.

## 2021-06-01 NOTE — PROGRESS NOTE ADULT - PROBLEM SELECTOR PLAN 1
MRI shows a small focus of restricted diffusion within the right posterior corona radiata measuring approximately 1.5 x 0.9 cm, compatible with an acute lacunar infarct.  Continue Plavix 75mg daily for 90 days and ASA 81mg daily indefinitely.  Continue Lipitor.  -Has been counseled patient on lifestyle modifications including importance of quitting smoking.  PT and OT evals: recommending rehab.  A1C 6.3, Lipid panel noted, LDL 87, HDL 50, .  Fall Risk precautions. Continue Aspirin and Plavix with high dose statin  PT as tolerated  Fall precautions  Discharge planning - rehab when bed available

## 2021-06-01 NOTE — PROGRESS NOTE ADULT - SUBJECTIVE AND OBJECTIVE BOX
MEDICAL ATTENDING NOTE    Patient is a 71y old  Male who presents with a chief complaint of CVA, weakness (31 May 2021 10:52)      INTERVAL HPI/OVERNIGHT EVENTS: offers no new complaints today    MEDICATIONS  (STANDING):  aspirin enteric coated 81 milliGRAM(s) Oral daily  atorvastatin 80 milliGRAM(s) Oral at bedtime  budesonide  80 MICROgram(s)/formoterol 4.5 MICROgram(s) Inhaler 2 Puff(s) Inhalation two times a day  clopidogrel Tablet 75 milliGRAM(s) Oral daily  enoxaparin Injectable 40 milliGRAM(s) SubCutaneous daily  nicotine -  14 mG/24Hr(s) Patch 1 patch Transdermal daily  tiotropium 18 MICROgram(s) Capsule 1 Capsule(s) Inhalation daily    MEDICATIONS  (PRN):  ALBUTerol    90 MICROgram(s) HFA Inhaler 2 Puff(s) Inhalation every 6 hours PRN Shortness of Breath and/or Wheezing      __________________________________________________  ----------------------------------------------------------------------------------  REVIEW OF SYSTEMS: negative      Vital Signs Last 24 Hrs  T(C): 36.6 (01 Jun 2021 12:20), Max: 36.9 (31 May 2021 19:37)  T(F): 97.8 (01 Jun 2021 12:20), Max: 98.4 (31 May 2021 19:37)  HR: 98 (01 Jun 2021 12:20) (85 - 98)  BP: 138/80 (01 Jun 2021 12:20) (131/75 - 143/82)  BP(mean): --  RR: 16 (01 Jun 2021 12:20) (16 - 19)  SpO2: 95% (01 Jun 2021 12:20) (91% - 95%)    _________________  PHYSICAL EXAM:  ---------------------------   NAD; Normocephalic;   LUNGS - no wheezing  HEART: S1 S2+   ABDOMEN: Soft, Nontender, non distended  EXTREMITIES: no cyanosis; no edema  NERVOUS SYSTEM:  Awake and alert; no focal neuro deficits appreciated    _________________________________________________  LABS:                        15.6   10.22 )-----------( 332      ( 01 Jun 2021 06:09 )             48.9     05-31    142  |  107  |  22  ----------------------------<  115<H>  4.1   |  26  |  0.70    Ca    8.9      31 May 2021 06:43          CAPILLARY BLOOD GLUCOSE                    Plan of care was discussed with patient ; all questions and concerns were addressed and care was aligned with patient's wishes.             MEDICAL ATTENDING NOTE    Patient is a 71y old  Male who presents with a chief complaint of CVA, weakness (31 May 2021 10:52)      INTERVAL HPI/OVERNIGHT EVENTS: unable to sleep well last night otherwise offers no new complaints today    MEDICATIONS  (STANDING):  aspirin enteric coated 81 milliGRAM(s) Oral daily  atorvastatin 80 milliGRAM(s) Oral at bedtime  budesonide  80 MICROgram(s)/formoterol 4.5 MICROgram(s) Inhaler 2 Puff(s) Inhalation two times a day  clopidogrel Tablet 75 milliGRAM(s) Oral daily  enoxaparin Injectable 40 milliGRAM(s) SubCutaneous daily  nicotine -  14 mG/24Hr(s) Patch 1 patch Transdermal daily  tiotropium 18 MICROgram(s) Capsule 1 Capsule(s) Inhalation daily    MEDICATIONS  (PRN):  ALBUTerol    90 MICROgram(s) HFA Inhaler 2 Puff(s) Inhalation every 6 hours PRN Shortness of Breath and/or Wheezing      __________________________________________________  ----------------------------------------------------------------------------------  REVIEW OF SYSTEMS: negative for fever or SOB, c/o insomnia last night      Vital Signs Last 24 Hrs  T(C): 36.6 (01 Jun 2021 12:20), Max: 36.9 (31 May 2021 19:37)  T(F): 97.8 (01 Jun 2021 12:20), Max: 98.4 (31 May 2021 19:37)  HR: 98 (01 Jun 2021 12:20) (85 - 98)  BP: 138/80 (01 Jun 2021 12:20) (131/75 - 143/82)  RR: 16 (01 Jun 2021 12:20) (16 - 19)  SpO2: 95% (01 Jun 2021 12:20) (91% - 95%)    _________________  PHYSICAL EXAM:  ---------------------------   NAD; Normocephalic;   LUNGS - bilateral air entry  HEART: S1 S2+   ABDOMEN: Soft, Nontender, non distended, BS+  EXTREMITIES: no cyanosis; no edema  NERVOUS SYSTEM:  Awake and alert; left hemiparesis    _________________________________________________  LABS:                        15.6   10.22 )-----------( 332      ( 01 Jun 2021 06:09 )             48.9     05-31    142  |  107  |  22  ----------------------------<  115<H>  4.1   |  26  |  0.70    Ca    8.9      31 May 2021 06:43          CAPILLARY BLOOD GLUCOSE                    Plan of care was discussed with patient ; all questions and concerns were addressed and care was aligned with patient's wishes.

## 2021-06-01 NOTE — PROGRESS NOTE ADULT - SUBJECTIVE AND OBJECTIVE BOX
Neurology follow up note    FUK [id number]      Interval History:    Patient feels ok no new complaints.    MEDICATIONS    ALBUTerol    90 MICROgram(s) HFA Inhaler 2 Puff(s) Inhalation every 6 hours PRN  aspirin enteric coated 81 milliGRAM(s) Oral daily  atorvastatin 80 milliGRAM(s) Oral at bedtime  budesonide  80 MICROgram(s)/formoterol 4.5 MICROgram(s) Inhaler 2 Puff(s) Inhalation two times a day  clopidogrel Tablet 75 milliGRAM(s) Oral daily  enoxaparin Injectable 40 milliGRAM(s) SubCutaneous daily  nicotine -  14 mG/24Hr(s) Patch 1 patch Transdermal daily  tiotropium 18 MICROgram(s) Capsule 1 Capsule(s) Inhalation daily      Allergies    No Known Allergies    Intolerances            Vital Signs Last 24 Hrs  T(C): 36.6 (01 Jun 2021 12:20), Max: 36.9 (31 May 2021 19:37)  T(F): 97.8 (01 Jun 2021 12:20), Max: 98.4 (31 May 2021 19:37)  HR: 98 (01 Jun 2021 12:20) (85 - 98)  BP: 138/80 (01 Jun 2021 12:20) (131/75 - 143/82)  BP(mean): --  RR: 16 (01 Jun 2021 12:20) (16 - 19)  SpO2: 95% (01 Jun 2021 12:20) (91% - 95%)    REVIEW OF SYSTEMS:  Constitutional:  The patient denies fever, chills, or night sweats.  Head:  No headaches.  Eyes:  No double vision or blurry vision.  Ears:  No ringing in the ears.  Neck:  No neck pain.  Cardiovascular:  No chest pain.  Respiratory:  No shortness of breath.  Abdomen:  No nausea, vomiting, or abdominal pain.  Extremities/Neurological:  No numbness or tingling.  Musculoskeletal:  No joint pain.      PHYSICAL EXAMINATION:    HEENT:  Head:  Normocephalic, atraumatic.  Eyes:  No scleral icterus.  Ears:  Hearing bilaterally intact.  NECK:  Supple..  CARDIOVASCULAR:  S1 and S2 heard.  RESPIRATORY:  Good air entry bilaterally.  ABDOMEN:  Soft, nontender.  EXTREMITIES:  No clubbing or cyanosis were noted.      NEUROLOGIC:  The patient is awake, alert,   Extraocular movements were intact.  Pupils were equal, round, and reactive bilaterally 3 mm to 2 mm.  Speech was less dysarthria noted today.  Smile was less slight left drop.  Motor:  Right upper and right lower were 5/5, left upper was able to elevate off bed  Overall strength was 4/5 bi/triceps hand grasp.   Left lower extremity was 4/5.  Sensory:  Bilateral upper and lower intact to light touch.                 LABS:  CBC Full  -  ( 01 Jun 2021 06:09 )  WBC Count : 10.22 K/uL  RBC Count : 5.80 M/uL  Hemoglobin : 15.6 g/dL  Hematocrit : 48.9 %  Platelet Count - Automated : 332 K/uL  Mean Cell Volume : 84.3 fl  Mean Cell Hemoglobin : 26.9 pg  Mean Cell Hemoglobin Concentration : 31.9 gm/dL  Auto Neutrophil # : x  Auto Lymphocyte # : x  Auto Monocyte # : x  Auto Eosinophil # : x  Auto Basophil # : x  Auto Neutrophil % : x  Auto Lymphocyte % : x  Auto Monocyte % : x  Auto Eosinophil % : x  Auto Basophil % : x      05-31    142  |  107  |  22  ----------------------------<  115<H>  4.1   |  26  |  0.70    Ca    8.9      31 May 2021 06:43      Hemoglobin A1C:       Vitamin B12         RADIOLOGY    ANALYSIS AND PLAN:  This is a 71-year-old with left-sided hemiparesis.  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  What could be calculated from the NIH stroke scale would be 4.  The patient would not be a TPA candidate secondary to timeframe of symptoms, and at present etiology of cerebrovascular accident unknown.  Telemetry evaluation.  MRI imaging of the brain noted positive CVA.  High-dose statin.  Physical therapy evaluation.  will change asa 81 and plavix 75 and after total of 90 days ok to dc plavix   cleared only form neurology for dc     Son's name is John, his telephone number is [phone]  5/29/2021 spoke to rafael 5/30/2021    Greater than 34 minutes of time was spent with the patient, plan of care, reviewing data, with greater than 50% of the visit was spent counseling and/or coordinating care with multidisciplinary healthcare team

## 2021-06-01 NOTE — PROGRESS NOTE ADULT - PROBLEM SELECTOR PLAN 5
Patient reports history of borderline diabetes, controlled with diet.  Patient with pre-diabetes based on of A1C 6.3, repeat 6.4.  Consistent carb diet.  Smoking cessation counselling down Patient reports history of borderline diabetes, controlled with diet.  Patient with pre-diabetes based on of A1C 6.3, repeat 6.4.  Consistent carb diet.

## 2021-06-01 NOTE — PROGRESS NOTE ADULT - PROBLEM SELECTOR PLAN 3
Current Smoker 1/2 pack/day > 50 years.  Has been advised to stop smoking.  Will order Spiriva and Symbicort in place of home inhaler medication.  Continue albuterol as needed for SOB/Wheezing. Current Smoker 1/2 pack/day > 50 years.  Smoking cessation counselling done.

## 2021-06-01 NOTE — PROGRESS NOTE ADULT - PROBLEM SELECTOR PLAN 4
-Chantix initially held due to strict NPO status.  -Continue nicotine patch 14mg/24 hr transdermal daily.   -Discharge on 14mg/24 hr patch for 6 weeks total then 7mg/24 hr patch for two more weeks. -Continue nicotine patch 14mg/24 hr transdermal daily.   -Discharge on 14mg/24 hr patch for 6 weeks total then 7mg/24 hr patch for two more weeks.

## 2021-06-02 LAB — SARS-COV-2 RNA SPEC QL NAA+PROBE: SIGNIFICANT CHANGE UP

## 2021-06-02 PROCEDURE — 99232 SBSQ HOSP IP/OBS MODERATE 35: CPT

## 2021-06-02 RX ORDER — NICOTINE POLACRILEX 2 MG
0 GUM BUCCAL
Qty: 0 | Refills: 0 | DISCHARGE
Start: 2021-06-02

## 2021-06-02 RX ORDER — ATORVASTATIN CALCIUM 80 MG/1
1 TABLET, FILM COATED ORAL
Qty: 0 | Refills: 0 | DISCHARGE
Start: 2021-06-02

## 2021-06-02 RX ORDER — ASPIRIN/CALCIUM CARB/MAGNESIUM 324 MG
1 TABLET ORAL
Qty: 0 | Refills: 0 | DISCHARGE
Start: 2021-06-02

## 2021-06-02 RX ORDER — HYDROCORTISONE 1 %
1 OINTMENT (GRAM) TOPICAL
Qty: 0 | Refills: 0 | DISCHARGE

## 2021-06-02 RX ORDER — CLOPIDOGREL BISULFATE 75 MG/1
1 TABLET, FILM COATED ORAL
Qty: 0 | Refills: 0 | DISCHARGE
Start: 2021-06-02

## 2021-06-02 RX ORDER — TIOTROPIUM BROMIDE 18 UG/1
1 CAPSULE ORAL; RESPIRATORY (INHALATION)
Qty: 0 | Refills: 0 | DISCHARGE
Start: 2021-06-02

## 2021-06-02 RX ADMIN — Medication 1 PATCH: at 12:10

## 2021-06-02 RX ADMIN — ENOXAPARIN SODIUM 40 MILLIGRAM(S): 100 INJECTION SUBCUTANEOUS at 12:10

## 2021-06-02 RX ADMIN — TIOTROPIUM BROMIDE 1 CAPSULE(S): 18 CAPSULE ORAL; RESPIRATORY (INHALATION) at 05:24

## 2021-06-02 RX ADMIN — Medication 1 PATCH: at 12:09

## 2021-06-02 RX ADMIN — Medication 1 PATCH: at 20:00

## 2021-06-02 RX ADMIN — BUDESONIDE AND FORMOTEROL FUMARATE DIHYDRATE 2 PUFF(S): 160; 4.5 AEROSOL RESPIRATORY (INHALATION) at 21:40

## 2021-06-02 RX ADMIN — ATORVASTATIN CALCIUM 80 MILLIGRAM(S): 80 TABLET, FILM COATED ORAL at 21:40

## 2021-06-02 RX ADMIN — CLOPIDOGREL BISULFATE 75 MILLIGRAM(S): 75 TABLET, FILM COATED ORAL at 12:10

## 2021-06-02 RX ADMIN — Medication 81 MILLIGRAM(S): at 12:10

## 2021-06-02 RX ADMIN — BUDESONIDE AND FORMOTEROL FUMARATE DIHYDRATE 2 PUFF(S): 160; 4.5 AEROSOL RESPIRATORY (INHALATION) at 05:25

## 2021-06-02 NOTE — PROGRESS NOTE ADULT - SUBJECTIVE AND OBJECTIVE BOX
Neurology follow up note    F[facility] [id number]      Interval History:    Patient feels ok no new complaints.    MEDICATIONS    ALBUTerol    90 MICROgram(s) HFA Inhaler 2 Puff(s) Inhalation every 6 hours PRN  aspirin enteric coated 81 milliGRAM(s) Oral daily  atorvastatin 80 milliGRAM(s) Oral at bedtime  budesonide  80 MICROgram(s)/formoterol 4.5 MICROgram(s) Inhaler 2 Puff(s) Inhalation two times a day  clopidogrel Tablet 75 milliGRAM(s) Oral daily  enoxaparin Injectable 40 milliGRAM(s) SubCutaneous daily  nicotine -  14 mG/24Hr(s) Patch 1 patch Transdermal daily  tiotropium 18 MICROgram(s) Capsule 1 Capsule(s) Inhalation daily      Allergies    No Known Allergies    Intolerances            Vital Signs Last 24 Hrs  T(C): 36.4 (02 Jun 2021 14:41), Max: 36.9 (01 Jun 2021 19:55)  T(F): 97.6 (02 Jun 2021 14:41), Max: 98.5 (01 Jun 2021 19:55)  HR: 96 (02 Jun 2021 14:41) (85 - 105)  BP: 151/74 (02 Jun 2021 14:41) (124/79 - 151/74)  BP(mean): --  RR: 18 (02 Jun 2021 14:41) (16 - 18)  SpO2: 94% (02 Jun 2021 14:41) (92% - 95%)      REVIEW OF SYSTEMS:  Constitutional:  The patient denies fever, chills, or night sweats.  Head:  No headaches.  Eyes:  No double vision or blurry vision.  Ears:  No ringing in the ears.  Neck:  No neck pain.  Cardiovascular:  No chest pain.  Respiratory:  No shortness of breath.  Abdomen:  No nausea, vomiting, or abdominal pain.  Extremities/Neurological:  No numbness or tingling.  Musculoskeletal:  No joint pain.      PHYSICAL EXAMINATION:    HEENT:  Head:  Normocephalic, atraumatic.  Eyes:  No scleral icterus.  Ears:  Hearing bilaterally intact.  NECK:  Supple..  CARDIOVASCULAR:  S1 and S2 heard.  RESPIRATORY:  Good air entry bilaterally.  ABDOMEN:  Soft, nontender.  EXTREMITIES:  No clubbing or cyanosis were noted.      NEUROLOGIC:  The patient is awake, alert,   Extraocular movements were intact.  Pupils were equal, round, and reactive bilaterally 3 mm to 2 mm.  Speech was less dysarthria noted today.  Smile was less slight left drop.  Motor:  Right upper and right lower were 5/5, left upper was able to elevate off bed  Overall strength was 4/5 bi/triceps hand grasp.   Left lower extremity was 4/5.  Sensory:  Bilateral upper and lower intact to light touch.               LABS:  CBC Full  -  ( 01 Jun 2021 06:09 )  WBC Count : 10.22 K/uL  RBC Count : 5.80 M/uL  Hemoglobin : 15.6 g/dL  Hematocrit : 48.9 %  Platelet Count - Automated : 332 K/uL  Mean Cell Volume : 84.3 fl  Mean Cell Hemoglobin : 26.9 pg  Mean Cell Hemoglobin Concentration : 31.9 gm/dL  Auto Neutrophil # : x  Auto Lymphocyte # : x  Auto Monocyte # : x  Auto Eosinophil # : x  Auto Basophil # : x  Auto Neutrophil % : x  Auto Lymphocyte % : x  Auto Monocyte % : x  Auto Eosinophil % : x  Auto Basophil % : x            Hemoglobin A1C:       Vitamin B12         RADIOLOGY        ANALYSIS AND PLAN:  This is a 71-year-old with left-sided hemiparesis.  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  What could be calculated from the NIH stroke scale would be 4.  The patient would not be a TPA candidate secondary to timeframe of symptoms, and at present etiology of cerebrovascular accident unknown.  Telemetry evaluation.  MRI imaging of the brain noted positive CVA.  High-dose statin.  Physical therapy evaluation.  will change asa 81 and plavix 75 and after total of 90 days ok to dc plavix   cleared only form neurology for dc   no new events     Son's name is John, his telephone number is [phone]  5/29/2021 spoke to rafael 5/30/2021    Greater than 31 minutes of time was spent with the patient, plan of care, reviewing data, with greater than 50% of the visit was spent counseling and/or coordinating care with multidisciplinary healthcare team

## 2021-06-02 NOTE — PROGRESS NOTE ADULT - PROBLEM SELECTOR PROBLEM 1
Cerebrovascular accident (CVA)

## 2021-06-02 NOTE — PROGRESS NOTE ADULT - PROBLEM SELECTOR PROBLEM 7
Discharge planning issues
Need for prophylactic measure

## 2021-06-02 NOTE — PROGRESS NOTE ADULT - ASSESSMENT
The patient is a 71-year-old man with PMH of borderline diabetes, current smoker, COPD presenting with left-sided (UE/LE) weakness and dizziness, admitted for CVA. 

## 2021-06-02 NOTE — PROGRESS NOTE ADULT - SUBJECTIVE AND OBJECTIVE BOX
MEDICAL ATTENDING NOTE    Patient is a 71y old  Male who presents with a chief complaint of CVA, weakness (02 Jun 2021 15:27)      INTERVAL HPI/OVERNIGHT EVENTS: offers no new complaints today    MEDICATIONS  (STANDING):  aspirin enteric coated 81 milliGRAM(s) Oral daily  atorvastatin 80 milliGRAM(s) Oral at bedtime  budesonide  80 MICROgram(s)/formoterol 4.5 MICROgram(s) Inhaler 2 Puff(s) Inhalation two times a day  clopidogrel Tablet 75 milliGRAM(s) Oral daily  enoxaparin Injectable 40 milliGRAM(s) SubCutaneous daily  nicotine -  14 mG/24Hr(s) Patch 1 patch Transdermal daily  tiotropium 18 MICROgram(s) Capsule 1 Capsule(s) Inhalation daily    MEDICATIONS  (PRN):  ALBUTerol    90 MICROgram(s) HFA Inhaler 2 Puff(s) Inhalation every 6 hours PRN Shortness of Breath and/or Wheezing      __________________________________________________  ----------------------------------------------------------------------------------  REVIEW OF SYSTEMS: negative      Vital Signs Last 24 Hrs  T(C): 36.4 (02 Jun 2021 14:41), Max: 36.9 (01 Jun 2021 19:55)  T(F): 97.6 (02 Jun 2021 14:41), Max: 98.5 (01 Jun 2021 19:55)  HR: 96 (02 Jun 2021 14:41) (85 - 105)  BP: 151/74 (02 Jun 2021 14:41) (124/79 - 151/74)  RR: 18 (02 Jun 2021 14:41) (16 - 18)  SpO2: 94% (02 Jun 2021 14:41) (92% - 95%)    _________________  PHYSICAL EXAM:  ---------------------------   NAD; Normocephalic;   LUNGS - bilateral air entry; no wheezing  HEART: S1 S2+   ABDOMEN: Soft, Nontender, non distended, BS+  EXTREMITIES: no cyanosis; no edema  NERVOUS SYSTEM:  Awake and alert; left hemiparesis    _________________________________________________  LABS:                        15.6   10.22 )-----------( 332      ( 01 Jun 2021 06:09 )             48.9               CAPILLARY BLOOD GLUCOSE                    Plan of care was discussed with patient ; all questions and concerns were addressed and care was aligned with patient's wishes.

## 2021-06-02 NOTE — CHART NOTE - NSCHARTNOTEFT_GEN_A_CORE
Assessment:   patient seen for follow up. seen sitting in chair. states with good PO intake. denies issues swallowing speech recommends MBS dysphagia soft nectar thick. 6/1 BM  Factors impacting intake: [ ] none [ ] nausea  [ ] vomiting [ ] diarrhea [ ] constipation  [ ]chewing problems [x ] swallowing issues  [ ] other:     Diet Presciption: Diet, Consistent Carbohydrate w/Evening Snack:   DASH/TLC {Sodium & Cholesterol Restricted}  Dysphagia 3, Soft, Nectar Consistency Fluid (DYS3NC) (05-27-21 @ 13:19)    Intake: %    Current Weight: 6/2 wt 132.2# 6/1 138.2 # no edema   % Weight Change    Pertinent Medications: MEDICATIONS  (STANDING):  aspirin enteric coated 81 milliGRAM(s) Oral daily  atorvastatin 80 milliGRAM(s) Oral at bedtime  budesonide  80 MICROgram(s)/formoterol 4.5 MICROgram(s) Inhaler 2 Puff(s) Inhalation two times a day  clopidogrel Tablet 75 milliGRAM(s) Oral daily  enoxaparin Injectable 40 milliGRAM(s) SubCutaneous daily  nicotine -  14 mG/24Hr(s) Patch 1 patch Transdermal daily  tiotropium 18 MICROgram(s) Capsule 1 Capsule(s) Inhalation daily    MEDICATIONS  (PRN):  ALBUTerol    90 MICROgram(s) HFA Inhaler 2 Puff(s) Inhalation every 6 hours PRN Shortness of Breath and/or Wheezing    Pertinent Labs: 05-31 Na142 mmol/L Glu 115 mg/dL<H> K+ 4.1 mmol/L Cr  0.70 mg/dL BUN 22 mg/dL 05-26 Chol 166 mg/dL LDL --    HDL 50 mg/dL Trig 149 mg/dL  A1c 6.4%    Skin: no pressure injury     Estimated Needs:   [x ] no change since previous assessment  [ ] recalculated:     Previous Nutrition Diagnosis:   [ ] Inadequate Energy Intake [ ]Inadequate Oral Intake [ ] Excessive Energy Intake   [ ] Underweight [ ] Increased Nutrient Needs [ ] Overweight/Obesity   [ ] Altered GI Function [ ] Unintended Weight Loss [ ] Food & Nutrition Related Knowledge Deficit [ ] Malnutrition   (X) no previous Dx  Nutrition Diagnosis is [ ] ongoing  [ ] resolved [x ] not applicable     New Nutrition Diagnosis: [x ] swallow difficulty related to motor causes as evidenced by dysphagia dx with MBS      Interventions:   Recommend  [ ] Change Diet To:  [ ] Nutrition Supplement  [ ] Nutrition Support  [x ] Other: continue diet as ordered. noted for possible discharge DM education provided this admit. follow PO intake, labs    Monitoring and Evaluation:   [x ] PO intake [ x ] Tolerance to diet prescription [ x ] weights [ x ] labs[ x ] follow up per protocol  [ ] other:

## 2021-06-03 ENCOUNTER — INPATIENT (INPATIENT)
Facility: HOSPITAL | Age: 71
LOS: 13 days | Discharge: HOME | DRG: 949 | End: 2021-06-17
Attending: STUDENT IN AN ORGANIZED HEALTH CARE EDUCATION/TRAINING PROGRAM | Admitting: PHYSICAL MEDICINE & REHABILITATION
Payer: MEDICARE

## 2021-06-03 ENCOUNTER — TRANSCRIPTION ENCOUNTER (OUTPATIENT)
Age: 71
End: 2021-06-03

## 2021-06-03 VITALS
SYSTOLIC BLOOD PRESSURE: 170 MMHG | OXYGEN SATURATION: 96 % | WEIGHT: 128.75 LBS | TEMPERATURE: 98 F | DIASTOLIC BLOOD PRESSURE: 79 MMHG | RESPIRATION RATE: 14 BRPM | HEIGHT: 64 IN | HEART RATE: 88 BPM

## 2021-06-03 VITALS
OXYGEN SATURATION: 97 % | HEART RATE: 92 BPM | RESPIRATION RATE: 20 BRPM | TEMPERATURE: 98 F | SYSTOLIC BLOOD PRESSURE: 148 MMHG | DIASTOLIC BLOOD PRESSURE: 82 MMHG

## 2021-06-03 DIAGNOSIS — I63.9 CEREBRAL INFARCTION, UNSPECIFIED: ICD-10-CM

## 2021-06-03 DIAGNOSIS — I63.81 OTHER CEREBRAL INFARCTION DUE TO OCCLUSION OR STENOSIS OF SMALL ARTERY: ICD-10-CM

## 2021-06-03 PROBLEM — R73.03 PREDIABETES: Chronic | Status: ACTIVE | Noted: 2021-05-25

## 2021-06-03 PROBLEM — J44.9 CHRONIC OBSTRUCTIVE PULMONARY DISEASE, UNSPECIFIED: Chronic | Status: ACTIVE | Noted: 2021-05-25

## 2021-06-03 LAB — SARS-COV-2 RNA SPEC QL NAA+PROBE: SIGNIFICANT CHANGE UP

## 2021-06-03 PROCEDURE — 84100 ASSAY OF PHOSPHORUS: CPT

## 2021-06-03 PROCEDURE — 84484 ASSAY OF TROPONIN QUANT: CPT

## 2021-06-03 PROCEDURE — 71250 CT THORAX DX C-: CPT

## 2021-06-03 PROCEDURE — 94640 AIRWAY INHALATION TREATMENT: CPT

## 2021-06-03 PROCEDURE — 85610 PROTHROMBIN TIME: CPT

## 2021-06-03 PROCEDURE — 97116 GAIT TRAINING THERAPY: CPT

## 2021-06-03 PROCEDURE — 70498 CT ANGIOGRAPHY NECK: CPT

## 2021-06-03 PROCEDURE — A9698: CPT

## 2021-06-03 PROCEDURE — 85025 COMPLETE CBC W/AUTO DIFF WBC: CPT

## 2021-06-03 PROCEDURE — U0005: CPT

## 2021-06-03 PROCEDURE — 99223 1ST HOSP IP/OBS HIGH 75: CPT

## 2021-06-03 PROCEDURE — 97165 OT EVAL LOW COMPLEX 30 MIN: CPT

## 2021-06-03 PROCEDURE — 99238 HOSP IP/OBS DSCHRG MGMT 30/<: CPT

## 2021-06-03 PROCEDURE — 86901 BLOOD TYPING SEROLOGIC RH(D): CPT

## 2021-06-03 PROCEDURE — 86850 RBC ANTIBODY SCREEN: CPT

## 2021-06-03 PROCEDURE — 83735 ASSAY OF MAGNESIUM: CPT

## 2021-06-03 PROCEDURE — 36415 COLL VENOUS BLD VENIPUNCTURE: CPT

## 2021-06-03 PROCEDURE — 99285 EMERGENCY DEPT VISIT HI MDM: CPT

## 2021-06-03 PROCEDURE — 97110 THERAPEUTIC EXERCISES: CPT

## 2021-06-03 PROCEDURE — 70450 CT HEAD/BRAIN W/O DYE: CPT

## 2021-06-03 PROCEDURE — 93005 ELECTROCARDIOGRAM TRACING: CPT

## 2021-06-03 PROCEDURE — 85730 THROMBOPLASTIN TIME PARTIAL: CPT

## 2021-06-03 PROCEDURE — 86900 BLOOD TYPING SEROLOGIC ABO: CPT

## 2021-06-03 PROCEDURE — 86803 HEPATITIS C AB TEST: CPT

## 2021-06-03 PROCEDURE — U0003: CPT

## 2021-06-03 PROCEDURE — 83036 HEMOGLOBIN GLYCOSYLATED A1C: CPT

## 2021-06-03 PROCEDURE — 80048 BASIC METABOLIC PNL TOTAL CA: CPT

## 2021-06-03 PROCEDURE — 97163 PT EVAL HIGH COMPLEX 45 MIN: CPT

## 2021-06-03 PROCEDURE — 70496 CT ANGIOGRAPHY HEAD: CPT

## 2021-06-03 PROCEDURE — 80061 LIPID PANEL: CPT

## 2021-06-03 PROCEDURE — 97112 NEUROMUSCULAR REEDUCATION: CPT

## 2021-06-03 PROCEDURE — 92611 MOTION FLUOROSCOPY/SWALLOW: CPT

## 2021-06-03 PROCEDURE — 70551 MRI BRAIN STEM W/O DYE: CPT

## 2021-06-03 PROCEDURE — 92610 EVALUATE SWALLOWING FUNCTION: CPT

## 2021-06-03 PROCEDURE — 80053 COMPREHEN METABOLIC PANEL: CPT

## 2021-06-03 PROCEDURE — 97535 SELF CARE MNGMENT TRAINING: CPT

## 2021-06-03 PROCEDURE — 97530 THERAPEUTIC ACTIVITIES: CPT

## 2021-06-03 PROCEDURE — 86769 SARS-COV-2 COVID-19 ANTIBODY: CPT

## 2021-06-03 PROCEDURE — 85027 COMPLETE CBC AUTOMATED: CPT

## 2021-06-03 PROCEDURE — 82553 CREATINE MB FRACTION: CPT

## 2021-06-03 PROCEDURE — 82962 GLUCOSE BLOOD TEST: CPT

## 2021-06-03 PROCEDURE — 0042T: CPT

## 2021-06-03 PROCEDURE — 74230 X-RAY XM SWLNG FUNCJ C+: CPT

## 2021-06-03 PROCEDURE — 71045 X-RAY EXAM CHEST 1 VIEW: CPT

## 2021-06-03 RX ORDER — LANOLIN ALCOHOL/MO/W.PET/CERES
3 CREAM (GRAM) TOPICAL AT BEDTIME
Refills: 0 | Status: DISCONTINUED | OUTPATIENT
Start: 2021-06-03 | End: 2021-06-12

## 2021-06-03 RX ORDER — AMLODIPINE BESYLATE 2.5 MG/1
2.5 TABLET ORAL ONCE
Refills: 0 | Status: COMPLETED | OUTPATIENT
Start: 2021-06-03 | End: 2021-06-03

## 2021-06-03 RX ORDER — SENNA PLUS 8.6 MG/1
2 TABLET ORAL AT BEDTIME
Refills: 0 | Status: DISCONTINUED | OUTPATIENT
Start: 2021-06-03 | End: 2021-06-17

## 2021-06-03 RX ORDER — NYSTATIN 500MM UNIT
500000 POWDER (EA) MISCELLANEOUS THREE TIMES A DAY
Refills: 0 | Status: DISCONTINUED | OUTPATIENT
Start: 2021-06-03 | End: 2021-06-17

## 2021-06-03 RX ORDER — TIOTROPIUM BROMIDE 18 UG/1
1 CAPSULE ORAL; RESPIRATORY (INHALATION) DAILY
Refills: 0 | Status: DISCONTINUED | OUTPATIENT
Start: 2021-06-03 | End: 2021-06-17

## 2021-06-03 RX ORDER — BUDESONIDE AND FORMOTEROL FUMARATE DIHYDRATE 160; 4.5 UG/1; UG/1
2 AEROSOL RESPIRATORY (INHALATION)
Refills: 0 | Status: DISCONTINUED | OUTPATIENT
Start: 2021-06-03 | End: 2021-06-17

## 2021-06-03 RX ORDER — ACETAMINOPHEN 500 MG
650 TABLET ORAL EVERY 6 HOURS
Refills: 0 | Status: DISCONTINUED | OUTPATIENT
Start: 2021-06-03 | End: 2021-06-17

## 2021-06-03 RX ORDER — ATORVASTATIN CALCIUM 80 MG/1
80 TABLET, FILM COATED ORAL AT BEDTIME
Refills: 0 | Status: DISCONTINUED | OUTPATIENT
Start: 2021-06-03 | End: 2021-06-17

## 2021-06-03 RX ORDER — ENOXAPARIN SODIUM 100 MG/ML
40 INJECTION SUBCUTANEOUS DAILY
Refills: 0 | Status: DISCONTINUED | OUTPATIENT
Start: 2021-06-03 | End: 2021-06-17

## 2021-06-03 RX ORDER — ALBUTEROL 90 UG/1
2 AEROSOL, METERED ORAL EVERY 6 HOURS
Refills: 0 | Status: DISCONTINUED | OUTPATIENT
Start: 2021-06-03 | End: 2021-06-17

## 2021-06-03 RX ORDER — CLOPIDOGREL BISULFATE 75 MG/1
75 TABLET, FILM COATED ORAL DAILY
Refills: 0 | Status: DISCONTINUED | OUTPATIENT
Start: 2021-06-03 | End: 2021-06-17

## 2021-06-03 RX ORDER — PETROLATUM,WHITE
1 JELLY (GRAM) TOPICAL
Refills: 0 | Status: DISCONTINUED | OUTPATIENT
Start: 2021-06-03 | End: 2021-06-10

## 2021-06-03 RX ORDER — POLYETHYLENE GLYCOL 3350 17 G/17G
17 POWDER, FOR SOLUTION ORAL
Refills: 0 | Status: DISCONTINUED | OUTPATIENT
Start: 2021-06-03 | End: 2021-06-17

## 2021-06-03 RX ORDER — ASPIRIN/CALCIUM CARB/MAGNESIUM 324 MG
81 TABLET ORAL DAILY
Refills: 0 | Status: DISCONTINUED | OUTPATIENT
Start: 2021-06-03 | End: 2021-06-17

## 2021-06-03 RX ORDER — AMLODIPINE BESYLATE 2.5 MG/1
2.5 TABLET ORAL DAILY
Refills: 0 | Status: DISCONTINUED | OUTPATIENT
Start: 2021-06-04 | End: 2021-06-17

## 2021-06-03 RX ORDER — NICOTINE POLACRILEX 2 MG
1 GUM BUCCAL DAILY
Refills: 0 | Status: DISCONTINUED | OUTPATIENT
Start: 2021-06-03 | End: 2021-06-17

## 2021-06-03 RX ADMIN — BUDESONIDE AND FORMOTEROL FUMARATE DIHYDRATE 2 PUFF(S): 160; 4.5 AEROSOL RESPIRATORY (INHALATION) at 05:23

## 2021-06-03 RX ADMIN — CLOPIDOGREL BISULFATE 75 MILLIGRAM(S): 75 TABLET, FILM COATED ORAL at 12:25

## 2021-06-03 RX ADMIN — Medication 81 MILLIGRAM(S): at 12:25

## 2021-06-03 RX ADMIN — Medication 1 PATCH: at 12:31

## 2021-06-03 RX ADMIN — Medication 1 PATCH: at 08:00

## 2021-06-03 RX ADMIN — AMLODIPINE BESYLATE 2.5 MILLIGRAM(S): 2.5 TABLET ORAL at 17:39

## 2021-06-03 RX ADMIN — ATORVASTATIN CALCIUM 80 MILLIGRAM(S): 80 TABLET, FILM COATED ORAL at 21:12

## 2021-06-03 RX ADMIN — Medication 1 APPLICATION(S): at 17:35

## 2021-06-03 RX ADMIN — SENNA PLUS 2 TABLET(S): 8.6 TABLET ORAL at 21:12

## 2021-06-03 RX ADMIN — Medication 500000 UNIT(S): at 21:11

## 2021-06-03 RX ADMIN — ENOXAPARIN SODIUM 40 MILLIGRAM(S): 100 INJECTION SUBCUTANEOUS at 12:25

## 2021-06-03 RX ADMIN — TIOTROPIUM BROMIDE 1 CAPSULE(S): 18 CAPSULE ORAL; RESPIRATORY (INHALATION) at 05:24

## 2021-06-03 RX ADMIN — BUDESONIDE AND FORMOTEROL FUMARATE DIHYDRATE 2 PUFF(S): 160; 4.5 AEROSOL RESPIRATORY (INHALATION) at 21:15

## 2021-06-03 RX ADMIN — Medication 1 PATCH: at 12:25

## 2021-06-03 NOTE — PROGRESS NOTE ADULT - SUBJECTIVE AND OBJECTIVE BOX
Neurology follow up note    FUK [id number]      Interval History:    Patient feels ok no new complaints.    MEDICATIONS    ALBUTerol    90 MICROgram(s) HFA Inhaler 2 Puff(s) Inhalation every 6 hours PRN  aspirin enteric coated 81 milliGRAM(s) Oral daily  atorvastatin 80 milliGRAM(s) Oral at bedtime  budesonide  80 MICROgram(s)/formoterol 4.5 MICROgram(s) Inhaler 2 Puff(s) Inhalation two times a day  clopidogrel Tablet 75 milliGRAM(s) Oral daily  enoxaparin Injectable 40 milliGRAM(s) SubCutaneous daily  nicotine -  14 mG/24Hr(s) Patch 1 patch Transdermal daily  tiotropium 18 MICROgram(s) Capsule 1 Capsule(s) Inhalation daily      Allergies    No Known Allergies    Intolerances            Vital Signs Last 24 Hrs  T(C): 36.6 (03 Jun 2021 04:43), Max: 36.6 (02 Jun 2021 17:41)  T(F): 97.8 (03 Jun 2021 04:43), Max: 97.9 (02 Jun 2021 17:41)  HR: 95 (03 Jun 2021 09:50) (76 - 96)  BP: 133/77 (03 Jun 2021 09:50) (124/72 - 151/74)  BP(mean): --  RR: 18 (03 Jun 2021 04:43) (18 - 18)  SpO2: 94% (03 Jun 2021 09:50) (94% - 97%)    REVIEW OF SYSTEMS:  Constitutional:  The patient denies fever, chills, or night sweats.  Head:  No headaches.  Eyes:  No double vision or blurry vision.  Ears:  No ringing in the ears.  Neck:  No neck pain.  Cardiovascular:  No chest pain.  Respiratory:  No shortness of breath.  Abdomen:  No nausea, vomiting, or abdominal pain.  Extremities/Neurological:  No numbness or tingling.  Musculoskeletal:  No joint pain.      PHYSICAL EXAMINATION:    HEENT:  Head:  Normocephalic, atraumatic.  Eyes:  No scleral icterus.  Ears:  Hearing bilaterally intact.  NECK:  Supple..  CARDIOVASCULAR:  S1 and S2 heard.  RESPIRATORY:  Good air entry bilaterally.  ABDOMEN:  Soft, nontender.  EXTREMITIES:  No clubbing or cyanosis were noted.      NEUROLOGIC:  The patient is awake, alert,   Extraocular movements were intact.  Pupils were equal, round, and reactive bilaterally 3 mm to 2 mm.  Speech was less dysarthria noted today.  Smile was less slight left drop.  Motor:  Right upper and right lower were 5/5, left upper was able to elevate off bed  Overall strength was 4/5 bi/triceps hand grasp.   Left lower extremity was 4/5.  Sensory:  Bilateral upper and lower intact to light touch.                    LABS:            Hemoglobin A1C:       Vitamin B12         RADIOLOGY    ANALYSIS AND PLAN:  This is a 71-year-old with left-sided hemiparesis.  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  What could be calculated from the NIH stroke scale would be 4.  The patient would not be a TPA candidate secondary to timeframe of symptoms, and at present etiology of cerebrovascular accident unknown.  Telemetry evaluation.  MRI imaging of the brain noted positive CVA.  High-dose statin.  Physical therapy evaluation.  will change asa 81 and plavix 75 and after total of 90 days ok to dc plavix   cleared only form neurology for dc   no new events   day by day appears stronger     Son's name is John, his telephone number is [phone]  5/29/2021 spoke to rafael 5/30/2021    Greater than 25 minutes of time was spent with the patient, plan of care, reviewing data, with greater than 50% of the visit was spent counseling and/or coordinating care with multidisciplinary healthcare team

## 2021-06-03 NOTE — DISCHARGE NOTE NURSING/CASE MANAGEMENT/SOCIAL WORK - NSDCPEWEB_GEN_ALL_CORE
[facility] for Tobacco Control website --- [email or web address] --- [email or web address]

## 2021-06-03 NOTE — DISCHARGE NOTE NURSING/CASE MANAGEMENT/SOCIAL WORK - PATIENT PORTAL LINK FT
You can access the FollowMyHealth Patient Portal offered by [facility] by registering at the following website: [email or web address]. By joining [employer]’s FollowMyHealth portal, you will also be able to view your health information using other applications (apps) compatible with our system.

## 2021-06-03 NOTE — PROGRESS NOTE ADULT - PROVIDER SPECIALTY LIST ADULT
Neurology
Internal Medicine
Hospitalist
Internal Medicine

## 2021-06-03 NOTE — H&P ADULT - NSHPREVIEWOFSYSTEMS_GEN_ALL_CORE
REVIEW OF SYSTEMS  Constitutional: No fever, No Chills, No fatigue  HEENT: No eye pain, No visual disturbances, No difficulty hearing, No Dysphagia   Pulm: No cough,  No shortness of breath  Cardio: No chest pain, No palpitations  GI:  No abdominal pain, No nausea, No vomiting, No diarrhea, No constipation, No incontinence, Last Bowel Movement on   : No dysuria, No frequency, No hematuria, No incontinence  Neuro: No headaches, No memory loss, No loss of strength, No numbness, No tremors  Skin: No itching, No rashes, No lesions   Endo: No temperature intolerance  MSK: No joint pain, No joint swelling, No muscle pain, No Neck or back pain  Psych:  No depression, No anxiety

## 2021-06-03 NOTE — H&P ADULT - HISTORY OF PRESENT ILLNESS
BRIANNE SCOTT is a 71M with PMHx of prediabetes, COPD, and 1/2ppd smoker, who presented to [facility] on 05/25/2021 with left-sided weakness. He experienced a similar episode several years ago when he was evaluated for a stroke and was informed he had imbalance in his ears and given medication for 3 days with resolution of symptoms.     CT head and CT angio head/neck on admission negative for acute pathology. MR brain with findings c/w acute right lacunar infarct; started on ASA/Plavix. He failed his initial dysphagia screen, but passed MBS on 5/27, on soft with nectar-thick fluids. Hospital course unremarkable.     Patient was evaluated by PM&R and therapy for functional deficits and gait/ ADL impairments and recommended acute rehabilitation. Patient was medically optimized for discharge to [facility] on 06/03/2021. BRIANNE SCOTT is a 71M with PMHx of prediabetes, COPD ( 1/2ppd smoker), who presented to [facility] 05/25/2021 with left-sided weakness. He experienced a similar episode several years ago when he was evaluated for a stroke and was informed he had imbalance in his ears and given medication for 3 days with resolution of symptoms. CT head and CT angio head/neck on admission negative for acute pathology. MR brain with findings c/w acute right lacunar infarct. He was started on ASA/Plavix. He failed his initial dysphagia screen, but passed MBS on 5/27, cleared for soft with nectar-thick fluids.     Hospital course was otherwise unremarkable. Patient was evaluated by PM&R and therapy for functional deficits and gait/ ADL impairments and recommended acute rehabilitation. Patient was medically optimized for discharge to [facility] on 06/03/2021. BRIANNE SCOTT is a 71M LH-dominant with PMHx of prediabetes, COPD ( 1/2ppd smoker), who presented to [facility] 05/25/2021 with left-sided weakness. He experienced a similar episode several years ago when he was evaluated for a stroke and was informed he had imbalance in his ears and given medication for 3 days with resolution of symptoms. CT head and CT angio head/neck on admission negative for acute pathology. MR brain with findings c/w acute right lacunar infarct. He was started on ASA/Plavix. He failed his initial dysphagia screen, but passed MBS on 5/27, cleared for soft with nectar-thick fluids.     Hospital course was otherwise unremarkable. Patient was evaluated by PM&R and therapy for functional deficits and gait/ ADL impairments and recommended acute rehabilitation. Patient was medically optimized for discharge to [facility] on 06/03/2021.

## 2021-06-03 NOTE — H&P ADULT - EXTREMITIES COMMENTS
normal tone, no joint swelling or warmth, no erythema  calves soft, NT no pedal edema  dry skin heels, mildly cracked, no redness    left shouler 4-/5 elbow flexion and extenson 4/5 gross grasp 4/5 wrist flexion and extension 4-/5 finger abduction 4-/5  right shoulder, elbow flexion/extension, wrist flexion/extension and gross grasp 5/5   right HF, quad, ham and ankle PF and DF 5/5  left HF 5-/5 quad 5-/5 ankle PF and DF 5/5

## 2021-06-03 NOTE — H&P ADULT - SENSORY
left finger to nose mod-severe dysmetria  left heel to shin mild deficits  right UE and LE WFL  no sensory deficits LT

## 2021-06-03 NOTE — H&P ADULT - MENTAL STATUS
aware of situation. Mood stable, good simple and sustained attention  conversational speech and word finding WFL  follows 2 step commands consistently

## 2021-06-03 NOTE — H&P ADULT - NSHPSOCIALHISTORY_GEN_ALL_CORE
SOCIAL HISTORY  Smoking - 1/5ppd; 25 pack years  EtOH - denied  Lives in private home with wife and son; has 1 KAPIL. Bedroom on ground floor      FUNCTIONAL HISTORY  Previous Functional Status:  Independent in ambulation, ADL's, transfers prior to hospitalization    Current Functional Status:  Bed mobility: CG-supervision  Transfers: Melissa  Gait / ambulation: Melissa RW 50'  ADL's: Melissa toilet tx, CG grooming  Speech: (+) dysphagia, soft with nectar-consistency fluid diet

## 2021-06-03 NOTE — H&P ADULT - NSHPPHYSICALEXAM_GEN_ALL_CORE
Constitutional - NAD, Comfortable  HEENT - NCAT, EOMI  Neck - Supple, No limited ROM  Chest - good chest expansion, good respiratory effort, CTAB   Cardio - warm and well perfused, RRR, no murmur  Abdomen -  Soft, NTND  Extremities - No peripheral edema, No calf tenderness   Neurologic Exam:                    Cognitive -             Orientation: Awake, Alert, AAO to self, place, date, year, situation            Attention:  Days of week, recall 3 objects without cuing            Memory: Recent/ remote memory intact            Thought: process, content appropriate     Speech - Fluent, Comprehensible, No dysarthria, No aphasia      Cranial Nerves - No facial asymmetry, Tongue midline, EOMI, Shoulder shrug intact     Motor -                      LEFT    UE - ShAB 5/5, EF 5/5, EE 5/5, WE 5/5,  WNL                    RIGHT UE - ShAB 5/5, EF 5/5, EE 5/5, WE 5/5,  WNL                    LEFT    LE - HF 5/5, KE 5/5, DF 5/5, PF 5/5                    RIGHT LE - HF 5/5, KE 5/5, DF 5/5, PF 5/5        Sensory - Intact to LT bilateral     Reflexes - DTR _ / 4 , neg Miller's b/l, neg Babinski's b/l     Coordination - FTN / HTS intact     OculoVestibular -  No nystagmus  Psychiatric - Mood stable, Affect WNL  Skin on admission:

## 2021-06-03 NOTE — H&P ADULT - CONSTITUTIONAL COMMENTS
Patient is thin male, pleasant, alert. Good simple attention, follows 1-2 step commands consistently O x 4

## 2021-06-03 NOTE — H&P ADULT - ASSESSMENT
BRIANNE SCOTT is a 71M with PMHx of prediabetes, COPD, and 1/2ppd smoker, who presented to [facility] on 05/25/2021 with left-sided weakness; found to have acute right lacunar infarct on MR brain. Admitted for multidisciplinary rehab program.      #Comprehensive Multidisciplinary Rehab Program:  - Gait, ADL, Functional impairments  - PT/OT/ SLP 3 hours a day 5 days a week    #Acute right lacunar infarct  - ASA/Plavix for 90 days, then continue on ASA only. Last day Plavix 8/25 (day 1: 5/28)  - atorvastatin 80mg qhs    #COPD  - 1/2ppd smoker; 25 pack years  - Nicotine patch 14mg/24hr x6 weeks, then 7mg/24hr x2 weeks  - symbicort and spiriva  - proventil HFA PRN    #Abnormal CT Chest on 5/30  - patchy ground-glass and patchy opacities in left lower lobe. Repeat follow up in 8 weeks    #Prediabetes  - A1C 6.4  - consistent carb diet    #Sleep:  - Maintain quiet hours and low stim environment  - Melatonin PRN    #Pain:  - Tylenol PRN  - avoid sedating meds that may affect cognitive recovery    #GI/Bowel:  - Senna 2 tabs qhs and Miralax PRN    #/Bladder:  - Monitor PVR if no void in 8h; SC for >400 cc  - Toileting schedule q4h    #Diet/Dysphagia:    - Diet: carb consistent; soft with nectar-consistency liquids  - ongoing SLP assessment  - Nutrition to follow    #Skin/ Pressure Injury Prevention:  - assessment on admission   - Incisions:  - Turn Q2hrs in bed while awake, OOB to Chair, PT/OT/SLP     #DVT prophylaxis:  - Lovenox  - SCDs    #Precautions/ Restrictions  - Falls  - Lungs: Aspiration, Incentive Spirometer    - COVID PCR: neg on 6/2    --------------------------------------------  Outpatient Follow up:  [facility]  [id number]  [address] [place] [address]  Phone: ([phone]  Fax: ()-  Follow Up Time:     Deng Mora (DO)  Neurology; Vascular Neurology  [address] [facility] [address], [address]  [place] [address]  Phone: [phone]  Fax: [phone]  --------------------------------------------      MEDICAL PROGNOSIS: GOOD            REHAB POTENTIAL: GOOD             ESTIMATED DISPOSITION: HOME WITH HOME CARE            ELOS: 10-14 Days   EXPECTED THERAPY:     P.T. 1hr/day       O.T. 1hr/day      S.L.P. 1hr/day     P&O Unnecessary     EXP FREQUENCY: 5 days per 7 day period     PRESCREEN COMPARISON:   I have reviewed the prescreen information and I have found no relevant changes between the preadmission screening and my post admission evaluation     RATIONALE FOR INPATIENT ADMISSION - Patient demonstrates the following: (check all that apply)  [X] Medically appropriate for rehabilitation admission  [X] Has attainable rehab goals with an appropriate initial discharge plan  [X] Has rehabilitation potential (expected to make a significant improvement within a reasonable period of time)   [X] Requires close medical management by a rehab physician, rehab nursing care, Hospitalist and comprehensive interdisciplinary team (including PT, OT, & or SLP, Prosthetics and Orthotics)  BRIANNE SCOTT is a 71M with PMHx of prediabetes, COPD, and 1/2ppd smoker, who presented to [facility] 05/25/2021 with lacute right lacunar infarct, left hemiparesis and dysphagia, dysarthria. Admitted for multidisciplinary rehab program.    #Acute right lacunar infarct  - ASA/Plavix for 90 days, then continue on ASA only. Last day Plavix 8/25 (day 1: 5/28)  - atorvastatin 80mg qhs  - - PT/OT/ SLP 3 hours a day 5 days a week  - Precautions: respiratory, aspiration, fall    #COPD  - 1/2 ppd smoker; 25 pack years  - Nicotine patch 14mg/24hr x6 weeks, then 7mg/24hr x2 weeks  - symbicort and spiriva  - proventil HFA PRN    #Abnormal CT Chest on 5/30  - patchy ground-glass and patchy opacities in left lower lobe  - COVID PCR: neg on 6/2  - Repeat follow up in 8 weeks  - hospitalist follow up    #Prediabetes A1C 6.4  - consistent carb diet  - nutrition consult, diabetic education    #Sleep:  - Maintain quiet hours and low stim environment  - Melatonin PRN    #Pain:  - Tylenol PRN  - avoid sedating meds that may affect cognitive recovery    #GI/Bowel:  - Senna 2 tabs qhs and Miralax PRN    #/Bladder:  - Monitor PVR if no void in 8h; SC for >400 cc  - Toileting schedule q4h    #Diet/Dysphagia:    - Diet: carb consistent; soft with nectar-consistency liquids  - ongoing SLP assessment  - Nutrition to follow    #Skin/ Pressure Injury Prevention:  - assessment on admission   - Incisions:  - Turn Q2hrs in bed while awake, OOB to Chair, PT/OT/SLP     #DVT prophylaxis:  - Lovenox  - SCDs        --------------------------------------------  Outpatient Follow up:  [facility]  [id number]  [address] [place] [address]  Phone: ()-  Fax: ()-  Follow Up Time:     Deng Mora (DO)  Neurology; Vascular Neurology  [address] [facility] [address], [address]  [place] [address]  Phone: [phone]  Fax: [phone]  --------------------------------------------      MEDICAL PROGNOSIS: GOOD            REHAB POTENTIAL: GOOD             ESTIMATED DISPOSITION: HOME WITH HOME CARE            ELOS: 10-14 Days   EXPECTED THERAPY:     P.T. 1hr/day       O.T. 1hr/day      S.L.P. 1hr/day     P&O Unnecessary     EXP FREQUENCY: 5 days per 7 day period     PRESCREEN COMPARISON:   I have reviewed the prescreen information and I have found no relevant changes between the preadmission screening and my post admission evaluation     RATIONALE FOR INPATIENT ADMISSION - Patient demonstrates the following: (check all that apply)  [X] Medically appropriate for rehabilitation admission  [X] Has attainable rehab goals with an appropriate initial discharge plan  [X] Has rehabilitation potential (expected to make a significant improvement within a reasonable period of time)   [X] Requires close medical management by a rehab physician, rehab nursing care, Hospitalist and comprehensive interdisciplinary team (including PT, OT, & or SLP, Prosthetics and Orthotics)  BRIANNE SCOTT is a 71M with PMHx of prediabetes, COPD, and 1/2ppd smoker, who presented to [facility] 05/25/2021 with lacute right lacunar infarct, left hemiparesis and dysphagia, dysarthria, ataxia. Admitted for multidisciplinary rehab program.    #Acute right lacunar infarct  - ASA/Plavix for 90 days, then continue on ASA only. Last day Plavix 8/25 (day 1: 5/28)  - atorvastatin 80mg qhs  - PT/OT/ SLP 3 hours a day 5 days a week  - Precautions: respiratory, aspiration, fall    #COPD  - 1/2 ppd smoker; 25 pack years  - Nicotine patch 14mg/24hr x6 weeks, then 7mg/24hr x2 weeks  - symbicort and spiriva  - proventil HFA PRN    #oral thrush:  - start nystatin swish and spit TID  - oral care  - maintain hydration    #Abnormal CT Chest on 5/30  - patchy ground-glass and patchy opacities in left lower lobe  - COVID PCR: neg on 6/2  - Repeat follow up in 8 weeks  - hospitalist follow up    # BP elevated on admission  then 160/90  - start norvasc 2.5 daily, first dose now    #Prediabetes A1C 6.4  - consistent carb diet  - nutrition consult, diabetic education    #Sleep:  - Maintain quiet hours and low stim environment  - Melatonin PRN    #Pain:  - Tylenol PRN  - avoid sedating meds that may affect cognitive recovery    #GI/Bowel:  - Senna 2 tabs qhs and Miralax PRN    #/Bladder:  - Monitor PVR if no void in 8h; SC for >400 cc  - Toileting schedule q4h    #Diet/Dysphagia:    - Diet: carb consistent; soft with nectar-consistency liquids  - ongoing SLP assessment  - Nutrition to follow    #Skin/ Pressure Injury Prevention:  - assessment on admission: intact. Aquaphor to dry skin heels bilaterally  - Turn Q2hrs in bed while awake, OOB to Chair, PT/OT/SLP     #DVT prophylaxis:  - Lovenox  - SCDs    #LABS  CBC BMP 6/4        --------------------------------------------  Outpatient Follow up:  [facility]  [id number]  [address] [place] [address]  Phone: ()-  Fax: ()-  Follow Up Time:     Deng Mora (DO)  Neurology; Vascular Neurology  [address] [facility], [address]  [place] [address]  Phone: [phone]  Fax: [phone]  --------------------------------------------      MEDICAL PROGNOSIS: GOOD            REHAB POTENTIAL: GOOD             ESTIMATED DISPOSITION: HOME WITH HOME CARE            ELOS: 10-14 Days   EXPECTED THERAPY:     P.T. 1hr/day       O.T. 1hr/day      S.L.P. 1hr/day     P&O Unnecessary     EXP FREQUENCY: 5 days per 7 day period     PRESCREEN COMPARISON:   I have reviewed the prescreen information and I have found no relevant changes between the preadmission screening and my post admission evaluation     RATIONALE FOR INPATIENT ADMISSION - Patient demonstrates the following: (check all that apply)  [X] Medically appropriate for rehabilitation admission  [X] Has attainable rehab goals with an appropriate initial discharge plan  [X] Has rehabilitation potential (expected to make a significant improvement within a reasonable period of time)   [X] Requires close medical management by a rehab physician, rehab nursing care, Hospitalist and comprehensive interdisciplinary team (including PT, OT, & or SLP, Prosthetics and Orthotics)

## 2021-06-03 NOTE — DISCHARGE NOTE NURSING/CASE MANAGEMENT/SOCIAL WORK - NSDCPEEMAIL_GEN_ALL_CORE
[facility] for Tobacco Control email [email or web address]

## 2021-06-03 NOTE — H&P ADULT - ATTENDING COMMENTS
Progress note amended to include my discussions with patient, resident, RN. ROS and physical input by me, BP noted and norvasc added. Nystatin also added for thrush with recommendations for oral care. Goals of rehab and structure reviewed with patient, diagnosis reviewed

## 2021-06-03 NOTE — H&P ADULT - NSHPLABSRESULTS_GEN_ALL_CORE
( 01 Jun 2021 06:09 )                 15.6   10.22 )-----------( 332                     48.9      < from: CT Angio Neck w/ IV Cont (05.25.21 @ 16:17) >  IMPRESSION:  HEAD CT: Mild volume loss, microvasculardisease, no acute hemorrhage or midline shift.  CT PERFUSION demonstrated: No core infarct. No active ischemia.  If symptoms persist consider follow up head CT or MRI, MRA  if no contraindication.  CTA COW:  Patent intracranial circulation without flow limiting stenosis.  CTA NECK: Patent, ECAs, ICAs, no  hemodynamically significant stenosis at  ICA origins by NASCET criteria.  Bilateral vertebral arteries are patent without flow limiting stenosis.  < end of copied text >    < from: MR Head No Cont (05.26.21 @ 11:20) >  IMPRESSION:   Small focus of restricted diffusion within the right posterior corona radiata measuring approximately 1.5 x 0.9 cm, compatible with an acute lacunar infarct. No associated mass effect or hemorrhage. Mild periventricular and subcortical white matter chronic microvascular ischemic changes.  < end of copied text >    < from: CT Chest No Cont (05.30.21 @ 12:00) >  IMPRESSION:  1.  Emphysema.  2.  Patchy groundglass opacities and nodular opacities and left lower lobe are of uncertain etiology. A follow-up is recommended in 8 weeks to evaluate for resolution/change.  < end of copied text >

## 2021-06-03 NOTE — H&P ADULT - ENMT COMMENTS
dysarthria, dysphagia. no throat pain, doesn't feel vocal quality signifcantly changed +mild oral thrush, no throat injection. tongue midline +left facial droop

## 2021-06-03 NOTE — PROGRESS NOTE ADULT - REASON FOR ADMISSION
CVA, weakness

## 2021-06-04 LAB
ALBUMIN SERPL ELPH-MCNC: 3.3 G/DL — SIGNIFICANT CHANGE UP (ref 3.3–5)
ALP SERPL-CCNC: 97 U/L — SIGNIFICANT CHANGE UP (ref 40–120)
ALT FLD-CCNC: 77 U/L — HIGH (ref 10–45)
ANION GAP SERPL CALC-SCNC: 11 MMOL/L — SIGNIFICANT CHANGE UP (ref 5–17)
AST SERPL-CCNC: 39 U/L — SIGNIFICANT CHANGE UP (ref 10–40)
BASOPHILS # BLD AUTO: 0.06 K/UL — SIGNIFICANT CHANGE UP (ref 0–0.2)
BASOPHILS NFR BLD AUTO: 0.6 % — SIGNIFICANT CHANGE UP (ref 0–2)
BILIRUB SERPL-MCNC: 0.5 MG/DL — SIGNIFICANT CHANGE UP (ref 0.2–1.2)
BUN SERPL-MCNC: 16 MG/DL — SIGNIFICANT CHANGE UP (ref 7–23)
CALCIUM SERPL-MCNC: 9.2 MG/DL — SIGNIFICANT CHANGE UP (ref 8.4–10.5)
CHLORIDE SERPL-SCNC: 105 MMOL/L — SIGNIFICANT CHANGE UP (ref 96–108)
CO2 SERPL-SCNC: 25 MMOL/L — SIGNIFICANT CHANGE UP (ref 22–31)
COVID-19 SPIKE DOMAIN AB INTERP: POSITIVE
COVID-19 SPIKE DOMAIN ANTIBODY RESULT: >250 U/ML — HIGH
CREAT SERPL-MCNC: 0.87 MG/DL — SIGNIFICANT CHANGE UP (ref 0.5–1.3)
EOSINOPHIL # BLD AUTO: 0.16 K/UL — SIGNIFICANT CHANGE UP (ref 0–0.5)
EOSINOPHIL NFR BLD AUTO: 1.6 % — SIGNIFICANT CHANGE UP (ref 0–6)
GLUCOSE SERPL-MCNC: 108 MG/DL — HIGH (ref 70–99)
HCT VFR BLD CALC: 45 % — SIGNIFICANT CHANGE UP (ref 39–50)
HGB BLD-MCNC: 14.9 G/DL — SIGNIFICANT CHANGE UP (ref 13–17)
IMM GRANULOCYTES NFR BLD AUTO: 0.7 % — SIGNIFICANT CHANGE UP (ref 0–1.5)
LYMPHOCYTES # BLD AUTO: 2.22 K/UL — SIGNIFICANT CHANGE UP (ref 1–3.3)
LYMPHOCYTES # BLD AUTO: 22.6 % — SIGNIFICANT CHANGE UP (ref 13–44)
MCHC RBC-ENTMCNC: 27.5 PG — SIGNIFICANT CHANGE UP (ref 27–34)
MCHC RBC-ENTMCNC: 33.1 GM/DL — SIGNIFICANT CHANGE UP (ref 32–36)
MCV RBC AUTO: 83.2 FL — SIGNIFICANT CHANGE UP (ref 80–100)
MONOCYTES # BLD AUTO: 1.31 K/UL — HIGH (ref 0–0.9)
MONOCYTES NFR BLD AUTO: 13.3 % — SIGNIFICANT CHANGE UP (ref 2–14)
NEUTROPHILS # BLD AUTO: 6.01 K/UL — SIGNIFICANT CHANGE UP (ref 1.8–7.4)
NEUTROPHILS NFR BLD AUTO: 61.2 % — SIGNIFICANT CHANGE UP (ref 43–77)
NRBC # BLD: 0 /100 WBCS — SIGNIFICANT CHANGE UP (ref 0–0)
PLATELET # BLD AUTO: 394 K/UL — SIGNIFICANT CHANGE UP (ref 150–400)
POTASSIUM SERPL-MCNC: 3.9 MMOL/L — SIGNIFICANT CHANGE UP (ref 3.5–5.3)
POTASSIUM SERPL-SCNC: 3.9 MMOL/L — SIGNIFICANT CHANGE UP (ref 3.5–5.3)
PROT SERPL-MCNC: 7 G/DL — SIGNIFICANT CHANGE UP (ref 6–8.3)
RBC # BLD: 5.41 M/UL — SIGNIFICANT CHANGE UP (ref 4.2–5.8)
RBC # FLD: 13.1 % — SIGNIFICANT CHANGE UP (ref 10.3–14.5)
SARS-COV-2 IGG+IGM SERPL QL IA: >250 U/ML — HIGH
SARS-COV-2 IGG+IGM SERPL QL IA: POSITIVE
SODIUM SERPL-SCNC: 141 MMOL/L — SIGNIFICANT CHANGE UP (ref 135–145)
WBC # BLD: 9.83 K/UL — SIGNIFICANT CHANGE UP (ref 3.8–10.5)
WBC # FLD AUTO: 9.83 K/UL — SIGNIFICANT CHANGE UP (ref 3.8–10.5)

## 2021-06-04 PROCEDURE — 99223 1ST HOSP IP/OBS HIGH 75: CPT

## 2021-06-04 PROCEDURE — 99232 SBSQ HOSP IP/OBS MODERATE 35: CPT | Mod: GC

## 2021-06-04 RX ORDER — LIDOCAINE 4 G/100G
1 CREAM TOPICAL DAILY
Refills: 0 | Status: DISCONTINUED | OUTPATIENT
Start: 2021-06-04 | End: 2021-06-10

## 2021-06-04 RX ORDER — CALAMINE AND ZINC OXIDE AND PHENOL 160; 10 MG/ML; MG/ML
1 LOTION TOPICAL
Refills: 0 | Status: DISCONTINUED | OUTPATIENT
Start: 2021-06-04 | End: 2021-06-10

## 2021-06-04 RX ADMIN — CALAMINE AND ZINC OXIDE AND PHENOL 1 APPLICATION(S): 160; 10 LOTION TOPICAL at 06:40

## 2021-06-04 RX ADMIN — ENOXAPARIN SODIUM 40 MILLIGRAM(S): 100 INJECTION SUBCUTANEOUS at 11:19

## 2021-06-04 RX ADMIN — CLOPIDOGREL BISULFATE 75 MILLIGRAM(S): 75 TABLET, FILM COATED ORAL at 11:19

## 2021-06-04 RX ADMIN — AMLODIPINE BESYLATE 2.5 MILLIGRAM(S): 2.5 TABLET ORAL at 05:41

## 2021-06-04 RX ADMIN — Medication 1 APPLICATION(S): at 05:42

## 2021-06-04 RX ADMIN — LIDOCAINE 1 PATCH: 4 CREAM TOPICAL at 18:21

## 2021-06-04 RX ADMIN — Medication 1 PATCH: at 11:19

## 2021-06-04 RX ADMIN — LIDOCAINE 1 PATCH: 4 CREAM TOPICAL at 23:00

## 2021-06-04 RX ADMIN — ATORVASTATIN CALCIUM 80 MILLIGRAM(S): 80 TABLET, FILM COATED ORAL at 22:26

## 2021-06-04 RX ADMIN — Medication 500000 UNIT(S): at 15:01

## 2021-06-04 RX ADMIN — Medication 1 APPLICATION(S): at 18:13

## 2021-06-04 RX ADMIN — Medication 500000 UNIT(S): at 22:26

## 2021-06-04 RX ADMIN — Medication 81 MILLIGRAM(S): at 11:19

## 2021-06-04 RX ADMIN — LIDOCAINE 1 PATCH: 4 CREAM TOPICAL at 11:20

## 2021-06-04 RX ADMIN — Medication 1 PATCH: at 18:21

## 2021-06-04 RX ADMIN — BUDESONIDE AND FORMOTEROL FUMARATE DIHYDRATE 2 PUFF(S): 160; 4.5 AEROSOL RESPIRATORY (INHALATION) at 21:09

## 2021-06-04 RX ADMIN — CALAMINE AND ZINC OXIDE AND PHENOL 1 APPLICATION(S): 160; 10 LOTION TOPICAL at 18:12

## 2021-06-04 RX ADMIN — Medication 500000 UNIT(S): at 05:42

## 2021-06-04 NOTE — PROGRESS NOTE ADULT - SUBJECTIVE AND OBJECTIVE BOX
DAILY PROGRESS NOTE:  HPI:  BRIANNE SCOTT is a 71M LH-dominant with PMHx of prediabetes, COPD ( 1/2ppd smoker), who presented to [facility] 2021 with left-sided weakness. He experienced a similar episode several years ago when he was evaluated for a stroke and was informed he had imbalance in his ears and given medication for 3 days with resolution of symptoms. CT head and CT angio head/neck on admission negative for acute pathology. MR brain with findings c/w acute right lacunar infarct. He was started on ASA/Plavix. He failed his initial dysphagia screen, but passed MBS on , cleared for soft with nectar-thick fluids.     Hospital course was otherwise unremarkable. Patient was evaluated by PM&R and therapy for functional deficits and gait/ ADL impairments and recommended acute rehabilitation. Patient was medically optimized for discharge to [facility] on 2021. (2021 13:29)      Subjective:  Patient was seen and examined at the bedside this AM. Patient developed a rash overnight across his abdomen and left LE; was prescribed Calamine cream, which provided patient with relief. He states that he may have a food allergy/intolerance, particularly to eggs and dairy products. He states he has had similar episodes in the past from consuming these foods and follows with his PCP, who recommended use of GoldBond craem, which he states provides good relief. Patient appears motivated to do therapy; states he has noticed significant left-sided improvement since hospitalization at [facility]. Reports left shoulder pain, but otherwise no other complaints. Last BM was yesterday morning.    ROS:  Denied nausea, vomiting, CP, or abdominal pain. Last BM was on 6/3      Physical Exam:  Vital Signs Last 24 Hrs  T(C): 36.3 (2021 07:29), Max: 36.7 (2021 13:56)  T(F): 97.4 (2021 07:29), Max: 98 (2021 13:56)  HR: 86 (2021 07:29) (85 - 95)  BP: 132/78 (2021 07:29) (124/70 - 170/79)  RR: 14 (2021 07:29) (14 - 20)  SpO2: 96% (2021 07:29) (94% - 97%)    06-03-21 @ 07:01  -  - @ 07:00  --------------------------------------------------------  IN: 520 mL / OUT: 1080 mL / NET: -560 mL      Constitutional - NAD, Comfortable  Chest - breathing comfortably on RA  Cardiovascular - warm and well perfused  Abdomen - BS+, Soft, NTND  Extremities - No LE edema. Left shoulder non-tender to palpation of deltoid or biceps tendon, but with pain on Balbuena test  Neurologic Exam -                    Cognitive - Awake and alert     Communication - Fluent, No dysarthria     Motor -                     LEFT    UE - ShAB 4/5, EF 4/5, EE 4/5, WE 4/5,  4/5                    RIGHT UE - ShAB 5/5, EF 5/5, EE 5/5, WE 5/5,  5/5                    LEFT    LE - HF 5/5, KE 5/5, DF 5/5, PF 5/5                    RIGHT LE - HF 5/5, KE 5/5, DF 5/5, PF 5/5        Sensory - Intact to LT     Coordination - FTN impaired on the left with dysmetria  Skin - diffuse, patchy erythematous coalescing papules across lower abdomen and low back. Also with similar lesions on anterior left thigh. Utilized Calamine cream, currently not pruritic.      Recent Labs/Imagin.9   9.83  )-----------( 394      ( 2021 06:02 )             45.0     06-04    141  |  105  |  16  ----------------------------<  108<H>  3.9   |  25  |  0.87    Ca    9.2      2021 06:02  TPro  7.0  /  Alb  3.3  /  TBili  0.5  /  DBili  x   /  AST  39  /  ALT  77<H>  /  AlkPhos  97  06-04      Medications:  acetaminophen   Tablet .. 650 milliGRAM(s) Oral every 6 hours PRN  ALBUTerol    90 MICROgram(s) HFA Inhaler 2 Puff(s) Inhalation every 6 hours PRN  amLODIPine   Tablet 2.5 milliGRAM(s) Oral daily  AQUAPHOR (petrolatum Ointment) 1 Application(s) Topical two times a day  aspirin enteric coated 81 milliGRAM(s) Oral daily  atorvastatin 80 milliGRAM(s) Oral at bedtime  budesonide  80 MICROgram(s)/formoterol 4.5 MICROgram(s) Inhaler 2 Puff(s) Inhalation two times a day  calamine/zinc oxide Lotion 1 Application(s) Topical two times a day  clopidogrel Tablet 75 milliGRAM(s) Oral daily  enoxaparin Injectable 40 milliGRAM(s) SubCutaneous daily  melatonin 3 milliGRAM(s) Oral at bedtime PRN  nicotine -  14 mG/24Hr(s) Patch 1 Patch Transdermal daily  nystatin    Suspension 235636 Unit(s) Oral three times a day  polyethylene glycol 3350 17 Gram(s) Oral two times a day PRN  senna 2 Tablet(s) Oral at bedtime  tiotropium 18 MICROgram(s) Capsule 1 Capsule(s) Inhalation daily

## 2021-06-04 NOTE — DIETITIAN INITIAL EVALUATION ADULT. - PHYSCIAL ASSESSMENT
No overt signs of muscle/wasting. Pt appears to have a small build based on visual assessment. well nourished

## 2021-06-04 NOTE — PROGRESS NOTE ADULT - ASSESSMENT
BRIANNE SOCTT is a 71M with PMHx of prediabetes, COPD, and 1/2ppd smoker, who presented to [facility] 05/25/2021 with lacute right lacunar infarct, left hemiparesis and dysphagia, dysarthria, ataxia. Admitted for multidisciplinary rehab program.      #Acute right lacunar infarct  - ASA/Plavix for 90 days, then continue on ASA only. Last day Plavix 8/25 (day 1: 5/28)  - atorvastatin 80mg qhs  - PT/OT/ SLP 3 hours a day 5 days a week  - Precautions: respiratory, aspiration, fall    #COPD  - 1/2 ppd smoker; 25 pack years  - Nicotine patch 14mg/24hr x6 weeks, then 7mg/24hr x2 weeks  - symbicort and spiriva  - proventil HFA PRN    #oral thrush:  - start nystatin swish and spit TID  - oral care  - maintain hydration    #Abnormal CT Chest on 5/30  - patchy ground-glass and patchy opacities in left lower lobe  - COVID PCR: neg on 6/2  - Repeat follow up in 8 weeks  - hospitalist follow up    # BP elevated on admission  then 160/90  - norvasc 2.5 daily (started 6/3) - improved to 132/78 this AM    #Prediabetes A1C 6.4  - consistent carb diet  - nutrition consult, diabetic education    #Sleep:  - Maintain quiet hours and low stim environment  - Melatonin PRN    #Pain:  - Tylenol PRN  - Lidocaine patch to left shoulder. Cold compress after therapy  - avoid sedating meds that may affect cognitive recovery    #GI/Bowel:  - Senna 2 tabs qhs and Miralax PRN    #/Bladder:  - Monitor PVR if no void in 8h; SC for >400 cc  - Toileting schedule q4h    #Diet/Dysphagia:    - Diet: carb consistent; soft with nectar-consistency liquids  - ongoing SLP assessment  - Nutrition to follow    #Skin/ Pressure Injury Prevention:  - assessment on admission: intact. Aquaphor to dry skin heels bilaterally  - Turn Q2hrs in bed while awake, OOB to Chair, PT/OT/SLP     #DVT prophylaxis:  - Lovenox  - SCDs    #LABS  CBC BMP 6/7

## 2021-06-04 NOTE — CONSULT NOTE ADULT - SUBJECTIVE AND OBJECTIVE BOX
Patient is a 71y old  Male who presents with a chief complaint of right lacunar CVA (03 Jun 2021 13:29)    HPI:  BRIANNE SCOTT is a 71M LH-dominant with PMHx of prediabetes, COPD ( 1/2ppd smoker), who presented to [facility] 05/25/2021 with left-sided weakness. He experienced a similar episode several years ago when he was evaluated for a stroke and was informed he had imbalance in his ears and given medication for 3 days with resolution of symptoms. CT head and CT angio head/neck on admission negative for acute pathology. MR brain with findings c/w acute right lacunar infarct. He was started on ASA/Plavix. He failed his initial dysphagia screen, but passed MBS on 5/27, cleared for soft with nectar-thick fluids.     Hospital course was otherwise unremarkable. Patient was evaluated by PM&R and therapy for functional deficits and gait/ ADL impairments and recommended acute rehabilitation. Patient was medically optimized for discharge to [facility] on 06/03/2021. (03 Jun 2021 13:29)    TODAY:  Patient seen and examined in [facility]  No overnight events    PAST MEDICAL & SURGICAL HISTORY:  Chronic obstructive pulmonary disease, unspecified COPD type  Current smoker  Borderline diabetic    No significant past surgical history    FAMILY HISTORY:  Father: - at age - with history of   Mother: - at age - with history of       SOCIAL HISTORY:  Substance Use (street drugs): (  ) never used  (  ) other:  Tobacco Usage:  (   ) never smoked   (   ) former smoker   (   ) current smoker  (     ) pack year  Alcohol Usage:  Sexual History:   Recent Travel:    ALLERGIES:  eggs (Pruritus)  Milk (Pruritus)  No Known Drug Allergies    No Known Intolerances      MEDICATIONS:  amLODIPine   Tablet 2.5 milliGRAM(s) Oral daily  AQUAPHOR (petrolatum Ointment) 1 Application(s) Topical two times a day  calamine/zinc oxide Lotion 1 Application(s) Topical two times a day  nystatin    Suspension 308797 Unit(s) Oral three times a day      REVIEW OF SYSTEMS:  CONSTITUTIONAL: No fever, weight loss, or fatigue  EYES: No eye pain, visual disturbances, or discharge  RESPIRATORY: No cough, wheezing, chills or hemoptysis; No shortness of breath  CARDIOVASCULAR: No chest pain, palpitations, dizziness, or leg swelling  GASTROINTESTINAL: No abdominal or epigastric pain. No nausea, vomiting, or hematemesis; No diarrhea or constipation. No melena or hematochezia.  GENITOURINARY: No dysuria, frequency, hematuria, or incontinence  NEUROLOGICAL: No headaches, memory loss, loss of strength, numbness, or tremors  SKIN: No itching, burning, rashes, or lesions   MUSCULOSKELETAL: No joint pain or swelling; No muscle, back, or extremity pain  PSYCHIATRIC: No depression, anxiety, mood swings, or difficulty sleeping    ALL OTHER SYSTEMS REVIEWED AND ARE NEGATIVE    VITAL SIGNS:  T(C): 36.6 (06-03-21 @ 19:56), Max: 36.7 (06-03-21 @ 13:56)  HR: 89 (06-04-21 @ 05:40) (85 - 95)  BP: 135/75 (06-04-21 @ 05:40) (124/70 - 170/79)  RR: 16 (06-03-21 @ 19:56) (14 - 20)  SpO2: 95% (06-03-21 @ 21:15) (94% - 97%)  Wt(kg): --Vital Signs Last 24 Hrs  T(C): 36.6 (03 Jun 2021 19:56), Max: 36.7 (03 Jun 2021 13:56)  T(F): 97.8 (03 Jun 2021 19:56), Max: 98 (03 Jun 2021 13:56)  HR: 89 (04 Jun 2021 05:40) (85 - 95)  BP: 135/75 (04 Jun 2021 05:40) (124/70 - 170/79)  BP(mean): --  RR: 16 (03 Jun 2021 19:56) (14 - 20)  SpO2: 95% (03 Jun 2021 21:15) (94% - 97%)    PHYSICAL EXAM:  GENERAL: NAD  HENT:  Atraumatic, Normocephalic; No tonsillar erythema, exudates, ulcers, or enlargement; Moist mucous membranes  EYES: EOMI, PERRLA, conjunctiva and sclera clear; no lid-lag  NECK: Supple, No JVD, Normal thyroid  NERVOUS SYSTEM:  Motor Strength 5/5 B/L upper and lower extremities; CN II-XII intact  CHEST/LUNG: Clear to percussion bilaterally; No rales, rhonchi, wheezing, or rubs; normal respiratory effort, no intercostal retractions  HEART: Regular rate and rhythm; No murmurs, rubs, or gallops  ABDOMEN: Soft, Nontender, Nondistended; Bowel sounds present; No HSM  EXTREMITIES:  2+ Peripheral Pulses, No clubbing, cyanosis, or peripheral edema  LYMPH: No lymphadenopathy noted  SKIN: No rashes or lesions; normal temperature and turgor   PSYCH: Appropriate affect; Alert & Oriented x 3    LABS:                        14.9   9.83  )-----------( 394      ( 04 Jun 2021 06:02 )             45.0     06-04    141  |  105  |  16  ----------------------------<  108<H>  3.9   |  25  |  0.87    Ca    9.2      04 Jun 2021 06:02    TPro  7.0  /  Alb  3.3  /  TBili  0.5  /  DBili  x   /  AST  39  /  ALT  77<H>  /  AlkPhos  97  06-04    CAPILLARY BLOOD GLUCOSE    Previous Consultant(s) Notes Reviewed:  YES  Care Discussed with Consultants/Other Providers YES  Previous Imaging Personally Reviewed:  YES Patient is a 71y old  Male who presents with a chief complaint of right lacunar CVA (2021 13:29)    HPI:  BRIANNE SCOTT is a 71M LH-dominant with PMHx of prediabetes, COPD ( 1/2ppd smoker), who presented to [facility] 2021 with left-sided weakness. He experienced a similar episode several years ago when he was evaluated for a stroke and was informed he had imbalance in his ears and given medication for 3 days with resolution of symptoms. CT head and CT angio head/neck on admission negative for acute pathology. MR brain with findings c/w acute right lacunar infarct. He was started on ASA/Plavix. He failed his initial dysphagia screen, but passed MBS on , cleared for soft with nectar-thick fluids.     Hospital course was otherwise unremarkable. Patient was evaluated by PM&R and therapy for functional deficits and gait/ ADL impairments and recommended acute rehabilitation. Patient was medically optimized for discharge to [facility] on 2021. (2021 13:29)    TODAY:  Patient seen and examined in [facility]  No overnight events  C/O left sided weakness with some discomfort at the left deltoid. Denies pain, numbness, or tingling of extremities    PAST MEDICAL & SURGICAL HISTORY:  Chronic obstructive pulmonary disease, unspecified COPD type  Current smoker  Borderline diabetic    No significant past surgical history    FAMILY HISTORY:  Mother: - . History CVA, HTN, breast cancer  Father: - . History Colon cancer  Brother: - . History Lung cancer      SOCIAL HISTORY:  Substance Use (street drugs):  (  ) never used  Tobacco Usage:  Smokin/2 pack/day since teens; 25 pack year history  Alcohol Usage: denies    ALLERGIES:  eggs (Pruritus)  Milk (Pruritus)  No Known Drug Allergies    No Known Intolerances      MEDICATIONS:  amLODIPine   Tablet 2.5 milliGRAM(s) Oral daily  AQUAPHOR (petrolatum Ointment) 1 Application(s) Topical two times a day  calamine/zinc oxide Lotion 1 Application(s) Topical two times a day  nystatin    Suspension 120100 Unit(s) Oral three times a day      REVIEW OF SYSTEMS:  CONSTITUTIONAL: No fever, weight loss, or fatigue  EYES: No eye pain, visual disturbances, or discharge  RESPIRATORY: No cough, wheezing, chills or hemoptysis; No shortness of breath  CARDIOVASCULAR: No chest pain, palpitations, dizziness, or leg swelling  GASTROINTESTINAL: No abdominal or epigastric pain. No nausea, vomiting, or hematemesis; No diarrhea or constipation. No melena or hematochezia.  GENITOURINARY: No dysuria, frequency, hematuria, or incontinence  NEUROLOGICAL: No headaches, memory loss, loss of strength, numbness, or tremors  SKIN: No itching, burning, rashes, or lesions   MUSCULOSKELETAL: + left sided muscle weakness; + left shoulder pain; No joint pain or swelling; No back, or extremity pain  PSYCHIATRIC: No depression, anxiety, mood swings, or difficulty sleeping    ALL OTHER SYSTEMS REVIEWED AND ARE NEGATIVE    VITAL SIGNS:  T(C): 36.6 (21 @ 19:56), Max: 36.7 (21 @ 13:56)  HR: 89 (21 @ 05:40) (85 - 95)  BP: 135/75 (21 @ 05:40) (124/70 - 170/79)  RR: 16 (21 @ 19:56) (14 - 20)  SpO2: 95% (21 @ 21:15) (94% - 97%)  Wt(kg): --Vital Signs Last 24 Hrs  T(C): 36.6 (2021 19:56), Max: 36.7 (2021 13:56)  T(F): 97.8 (2021 19:56), Max: 98 (2021 13:56)  HR: 89 (2021 05:40) (85 - 95)  BP: 135/75 (2021 05:40) (124/70 - 170/79)  BP(mean): --  RR: 16 (2021 19:56) (14 - 20)  SpO2: 95% (2021 21:15) (94% - 97%)    PHYSICAL EXAM:  GENERAL: NAD  HENT:  Atraumatic, Normocephalic; No tonsillar erythema, exudates, ulcers, or enlargement; Moist mucous membranes  EYES: EOMI, PERRLA, conjunctiva and sclera clear; no lid-lag  NECK: Supple, No JVD, Normal thyroid  NERVOUS SYSTEM:  Motor Strength 4/5 LUE and LLE. 5/5 RUE and RLE; CN II-XII intact; follows commands  CHEST/LUNG: Clear to percussion bilaterally; No rales, rhonchi, wheezing, or rubs; normal respiratory effort, no intercostal retractions  HEART: Regular rate and rhythm; No murmurs, rubs, or gallops  ABDOMEN: Soft, Nontender, Nondistended; Bowel sounds present; No HSM  EXTREMITIES:  2+ Peripheral Pulses, No clubbing, cyanosis, or peripheral edema  LYMPH: No lymphadenopathy noted  SKIN: No rashes or lesions; normal temperature and turgor   PSYCH: Appropriate affect; Alert & Oriented x 3    LABS:                        14.9   9.83  )-----------( 394      ( 2021 06:02 )             45.0     06-04    141  |  105  |  16  ----------------------------<  108<H>  3.9   |  25  |  0.87    Ca    9.2      2021 06:02    TPro  7.0  /  Alb  3.3  /  TBili  0.5  /  DBili  x   /  AST  39  /  ALT  77<H>  /  AlkPhos  97  06-04    CAPILLARY BLOOD GLUCOSE    Previous Consultant(s) Notes Reviewed:  YES  Care Discussed with Consultants/Other Providers YES  Previous Imaging Personally Reviewed:  YES

## 2021-06-04 NOTE — CONSULT NOTE ADULT - ASSESSMENT
71-year-old man with history of prediabetes and COPD admitted to [facility] after hospital course for acute CVA.    Acute CVA (right lacunar infarct) resulting in Impaired gait, mobility, ADL  Dysphagia  - Comprehensive rehab program of PT/OT/SLP - 3 hours a day, 5 days a week  - Fall precautions  - Continue statin  - Continue Plavix 75mg daily for 90 days and ASA 81mg daily indefinitely  - Lipid LDL 87, HDL 50,  (5/26/21)  - Bowel regimen as per primary team  - Pain meds as per primary team     Prediabetes   - Stable  - A1C 6.3 (5/26/21)  - Diabetes Diet    COPD - stable  Current Smoker  - Smoking cessation given. Chantix at home  - Continue nicotine patch (14mg/24 hr patch for 6 weeks total then 7mg/24 hr patch for two more weeks afterwrards)  - Continue Spiriva, Symbicort, and PRN albuterol    Uncontrolled Hypertension  - Continue amlodipine  - Consider ACEi since Prediabetes with elevated BP  - Monitor BP    Elevated ALT  - monitor for now    DVT ppx - lovenox

## 2021-06-04 NOTE — DIETITIAN INITIAL EVALUATION ADULT. - ADD RECOMMEND
1. Follow SLP recommendations 2. Obtain food preferences and honor as able 3. Monitor PO intake, weight, bowels, labs

## 2021-06-04 NOTE — CHART NOTE - NSCHARTNOTEFT_GEN_A_CORE
REHABILITATION DIAGNOSIS/IMPAIRMENT GROUP CODE:  Right posterior corona radiata infarct    COMORBIDITIES/COMPLICATING CONDITIONS IMPACTING REHABILITATION:  HEALTH ISSUES - PROBLEM Dx:  Left hemiparesis  Dysmetria        PAST MEDICAL & SURGICAL HISTORY:  Chronic obstructive pulmonary disease, unspecified COPD type  Current smoker  Borderline diabetic            Based upon consideration of the patient's impairments, functional status, complicating conditions and any other contributing factors and after information garnered from the assessments of all therapy disciplines involved in treating the patient and other pertinent clinicians:    INTERDISCIPLINARY REHABILITATION INTERVENTIONS:    [ x  ] Transfer Training  [ x  ] Bed Mobility  [  x ] Therapeutic Exercise  [  x ] Balance/Coordination Exercises  [ x  ] Locomotion retraining  [ x  ] Stairs  [ x  ] Functional Transfer Training  [ x  ] Bowel/Bladder program  [ x  ] Pain Management  [  x ] Skin/Wound Care  [   ] Visual/Perceptual Training  [   ] Therapeutic Recreation Activities  [  x ] Neuromuscular Re-education  [ x  ] Activities of Daily Living  [ x  ] Speech Exercise  [   ] Swallowing Exercises  [   ] Vital Stim  [   ] Dietary Supplements  [   ] Calorie Count  [   ] Cognitive Exercises  [   ] Cognitive/Linguistic Treatment  [   ] Behavior Program  [   ] Neuropsych Therapy  [ x  ] Patient/Family Counseling  [ x  ] Family Training  [   ] Community Re-entry  [   ] Orthotic Evaluation  [   ] Prosthetic Eval/Training    MEDICAL PROGNOSIS:  fair    REHAB POTENTIAL:  fair    EXPECTED DAILY THERAPY:         PT:       OT:       ST:       S&O:    EXPECTED INTENSITY OF PROGRAM:  ***    EXPECTED FREQUENCY OF PROGRAM:  ***    ESTIMATED LOS:  ***    ESTIMATED DISPOSITION:  ***    INTERDISCIPLINARY FUNCTIONAL OUTCOMES?GOALS:         Gait/Mobility:       Transfers:       ADLs:       Functional Transfers:       Medication Management:       Communication:       Cognitive:       Dysphagia:       Bladder       Bowel: REHABILITATION DIAGNOSIS/IMPAIRMENT GROUP CODE:  Right posterior corona radiata infarct    COMORBIDITIES/COMPLICATING CONDITIONS IMPACTING REHABILITATION:  HEALTH ISSUES - PROBLEM Dx:  Left hemiparesis  Dysmetria        PAST MEDICAL & SURGICAL HISTORY:  Chronic obstructive pulmonary disease, unspecified COPD type  Current smoker  Borderline diabetic            Based upon consideration of the patient's impairments, functional status, complicating conditions and any other contributing factors and after information garnered from the assessments of all therapy disciplines involved in treating the patient and other pertinent clinicians:    INTERDISCIPLINARY REHABILITATION INTERVENTIONS:    [ x  ] Transfer Training  [ x  ] Bed Mobility  [  x ] Therapeutic Exercise  [  x ] Balance/Coordination Exercises  [ x  ] Locomotion retraining  [ x  ] Stairs  [ x  ] Functional Transfer Training  [ x  ] Bowel/Bladder program  [ x  ] Pain Management  [  x ] Skin/Wound Care  [   ] Visual/Perceptual Training  [   ] Therapeutic Recreation Activities  [  x ] Neuromuscular Re-education  [ x  ] Activities of Daily Living  [ x  ] Speech Exercise  [   ] Swallowing Exercises  [   ] Vital Stim  [   ] Dietary Supplements  [   ] Calorie Count  [   ] Cognitive Exercises  [   ] Cognitive/Linguistic Treatment  [   ] Behavior Program  [   ] Neuropsych Therapy  [ x  ] Patient/Family Counseling  [ x  ] Family Training  [   ] Community Re-entry  [   ] Orthotic Evaluation  [   ] Prosthetic Eval/Training    MEDICAL PROGNOSIS:  fair    REHAB POTENTIAL:  fair    EXPECTED DAILY THERAPY:         PT:1 hr/day        OT:1 hr/day        ST:1 hr/day        S&O:na    EXPECTED INTENSITY OF PROGRAM:  3 hrs/day     EXPECTED FREQUENCY OF PROGRAM:  5 days/week     ESTIMATED LOS:  10-14 days    ESTIMATED DISPOSITION:  home     INTERDISCIPLINARY FUNCTIONAL OUTCOMES/GOALS:         Gait/Mobility:Modified independent        Transfers:Modified independent        ADLs:Modified independent        Functional Transfers:Modified independent        Medication Management:Modified independent        Communication:Modified independent        Cognitive:Modified independent        Dysphagia:Modified independent        Bladder:Modified independent        Bowel:Modified independent

## 2021-06-04 NOTE — CHART NOTE - NSCHARTNOTEFT_GEN_A_CORE
Was told by RN that patient has an itchy skin rash.  Upon seeing patient, patient is comfortable. Says he has a rash whenever he eats milk or egg products, and develops itchy rash at night that he uses Goldbaum lotion for.  On inspection, patient has bilateral flank urticaria that wraps to the lateral parts of the mid to lower abdomen.  There is also what appears to be a rash on LLE just below the knee, most consistent with Dermatitis herpetiformis - maybe he has some underlying Celiac Disease as well.    Patient does not appear concerned -- nor do I.   I asked him to inform the nutritions of his food allergies later this AM.  Calamine lotion prn for itching.

## 2021-06-04 NOTE — DIETITIAN INITIAL EVALUATION ADULT. - OTHER INFO
71M with PMHx of prediabetes, COPD, and 1/2ppd smoker, who presented to [facility] 05/25/2021 with lacute right lacunar infarct, left hemiparesis and dysphagia, dysarthria, ataxia. Admitted for multidisciplinary rehab program.    Pt tolerating consistent carbohydrate w/ evening snack, DASH/TLC, dysphagia 3 soft, nectar consistency fluid with good PO intake per pt. Pt reports an allergy to eggs and milk, reports that he gets a rash on his chest after consuming anything with eggs or milk, but pt admits sometimes at home he does consume foods containing eggs/milk and uses Goldbond cream or calamine lotion for rash. Pt educated that his diet here will not contain eggs/milk. Pt provided with written nutrition education on heart healthy consistent carbohydrate diet, reports that he will have his son review it with him. Pt denies nausea, vomiting, constipation, or diarrhea. Last BM today per pt. Reports UBW of ~130 lbs, denies any recent noticeable weight loss/gain. Weights @ [facility] were between 127.6-132.2 lbs. Current weight is 128.7 lbs. Skin noted w/ rash on buttocks & abdomen. No edema per nursing flow sheets.

## 2021-06-04 NOTE — DIETITIAN INITIAL EVALUATION ADULT. - SIGNS/SYMPTOMS
as evidenced by lack of prior nutrition education on heart healthy, consistent carb diet as evidenced by dysphagia requiring mechanically altered diet

## 2021-06-04 NOTE — DIETITIAN INITIAL EVALUATION ADULT. - ORAL INTAKE PTA/DIET HISTORY
Pt was @ [facility] PTA, reports fair-good PO intake there. Prior to hospitalization, pt reports good PO intake/appetite. Eats 3 meals/day, wife cooks all of his meals. Breakfast is usually 2 slices of toast + strawberry jam, lunch and dinner are some kind of noodles + protein. Pt was not monitoring salt or carbohydrate intake PTA. Reports hx of HTN.

## 2021-06-05 PROCEDURE — 99232 SBSQ HOSP IP/OBS MODERATE 35: CPT

## 2021-06-05 RX ADMIN — Medication 1 PATCH: at 11:39

## 2021-06-05 RX ADMIN — Medication 500000 UNIT(S): at 21:03

## 2021-06-05 RX ADMIN — BUDESONIDE AND FORMOTEROL FUMARATE DIHYDRATE 2 PUFF(S): 160; 4.5 AEROSOL RESPIRATORY (INHALATION) at 08:38

## 2021-06-05 RX ADMIN — ATORVASTATIN CALCIUM 80 MILLIGRAM(S): 80 TABLET, FILM COATED ORAL at 21:03

## 2021-06-05 RX ADMIN — Medication 81 MILLIGRAM(S): at 11:39

## 2021-06-05 RX ADMIN — Medication 1 APPLICATION(S): at 17:29

## 2021-06-05 RX ADMIN — CALAMINE AND ZINC OXIDE AND PHENOL 1 APPLICATION(S): 160; 10 LOTION TOPICAL at 17:29

## 2021-06-05 RX ADMIN — ENOXAPARIN SODIUM 40 MILLIGRAM(S): 100 INJECTION SUBCUTANEOUS at 11:38

## 2021-06-05 RX ADMIN — Medication 1 PATCH: at 07:54

## 2021-06-05 RX ADMIN — CLOPIDOGREL BISULFATE 75 MILLIGRAM(S): 75 TABLET, FILM COATED ORAL at 11:38

## 2021-06-05 RX ADMIN — BUDESONIDE AND FORMOTEROL FUMARATE DIHYDRATE 2 PUFF(S): 160; 4.5 AEROSOL RESPIRATORY (INHALATION) at 21:44

## 2021-06-05 RX ADMIN — Medication 1 APPLICATION(S): at 05:33

## 2021-06-05 RX ADMIN — TIOTROPIUM BROMIDE 1 CAPSULE(S): 18 CAPSULE ORAL; RESPIRATORY (INHALATION) at 08:38

## 2021-06-05 RX ADMIN — Medication 1 PATCH: at 18:03

## 2021-06-05 RX ADMIN — Medication 500000 UNIT(S): at 05:34

## 2021-06-05 RX ADMIN — Medication 1 PATCH: at 11:38

## 2021-06-05 RX ADMIN — CALAMINE AND ZINC OXIDE AND PHENOL 1 APPLICATION(S): 160; 10 LOTION TOPICAL at 05:34

## 2021-06-05 NOTE — PROGRESS NOTE ADULT - SUBJECTIVE AND OBJECTIVE BOX
Cc: Gait dysfunction    HPI: Patient with no new medical issues today.  Pain controlled, no chest pain, no N/V, no Fevers/Chills. No other new ROS  Has been tolerating rehabilitation program.    acetaminophen   Tablet .. 650 milliGRAM(s) Oral every 6 hours PRN  ALBUTerol    90 MICROgram(s) HFA Inhaler 2 Puff(s) Inhalation every 6 hours PRN  amLODIPine   Tablet 2.5 milliGRAM(s) Oral daily  AQUAPHOR (petrolatum Ointment) 1 Application(s) Topical two times a day  aspirin enteric coated 81 milliGRAM(s) Oral daily  atorvastatin 80 milliGRAM(s) Oral at bedtime  budesonide  80 MICROgram(s)/formoterol 4.5 MICROgram(s) Inhaler 2 Puff(s) Inhalation two times a day  calamine/zinc oxide Lotion 1 Application(s) Topical two times a day  clopidogrel Tablet 75 milliGRAM(s) Oral daily  enoxaparin Injectable 40 milliGRAM(s) SubCutaneous daily  lidocaine   Patch 1 Patch Transdermal daily  melatonin 3 milliGRAM(s) Oral at bedtime PRN  nicotine -  14 mG/24Hr(s) Patch 1 Patch Transdermal daily  nystatin    Suspension 853739 Unit(s) Oral three times a day  polyethylene glycol 3350 17 Gram(s) Oral two times a day PRN  senna 2 Tablet(s) Oral at bedtime  tiotropium 18 MICROgram(s) Capsule 1 Capsule(s) Inhalation daily      T(C): 36.4 (06-05-21 @ 07:57), Max: 36.6 (06-04-21 @ 19:33)  HR: 73 (06-05-21 @ 08:40) (66 - 97)  BP: 126/73 (06-05-21 @ 07:57) (108/70 - 126/73)  RR: 14 (06-05-21 @ 07:57) (14 - 16)  SpO2: 94% (06-05-21 @ 08:40) (94% - 96%)    In NAD  HEENT- EOMI  Heart- RRR, S1S2  Lungs- CTA bl.  Abd- + BS, NT  Ext- No calf pain  Neuro- Exam unchanged          Imp: Patient with diagnosis of acute right lucanar CVA admitted for comprehensive acute rehabilitation.    Plan:  - Continue PT/OT/SLP  - DVT prophylaxis  - Skin- Turn q2h, check skin daily  - Continue current medications; patient medically stable.   -Active issues- monitor BP  - Patient is stable to continue current rehabilitation program.

## 2021-06-06 PROCEDURE — 99232 SBSQ HOSP IP/OBS MODERATE 35: CPT

## 2021-06-06 RX ADMIN — ENOXAPARIN SODIUM 40 MILLIGRAM(S): 100 INJECTION SUBCUTANEOUS at 12:19

## 2021-06-06 RX ADMIN — TIOTROPIUM BROMIDE 1 CAPSULE(S): 18 CAPSULE ORAL; RESPIRATORY (INHALATION) at 08:56

## 2021-06-06 RX ADMIN — Medication 1 PATCH: at 07:56

## 2021-06-06 RX ADMIN — Medication 1 APPLICATION(S): at 17:38

## 2021-06-06 RX ADMIN — AMLODIPINE BESYLATE 2.5 MILLIGRAM(S): 2.5 TABLET ORAL at 05:38

## 2021-06-06 RX ADMIN — Medication 81 MILLIGRAM(S): at 12:19

## 2021-06-06 RX ADMIN — Medication 1 PATCH: at 08:06

## 2021-06-06 RX ADMIN — CLOPIDOGREL BISULFATE 75 MILLIGRAM(S): 75 TABLET, FILM COATED ORAL at 12:19

## 2021-06-06 RX ADMIN — Medication 500000 UNIT(S): at 13:57

## 2021-06-06 RX ADMIN — SENNA PLUS 2 TABLET(S): 8.6 TABLET ORAL at 21:00

## 2021-06-06 RX ADMIN — BUDESONIDE AND FORMOTEROL FUMARATE DIHYDRATE 2 PUFF(S): 160; 4.5 AEROSOL RESPIRATORY (INHALATION) at 21:28

## 2021-06-06 RX ADMIN — Medication 500000 UNIT(S): at 05:38

## 2021-06-06 RX ADMIN — ATORVASTATIN CALCIUM 80 MILLIGRAM(S): 80 TABLET, FILM COATED ORAL at 21:00

## 2021-06-06 RX ADMIN — Medication 500000 UNIT(S): at 21:00

## 2021-06-06 RX ADMIN — BUDESONIDE AND FORMOTEROL FUMARATE DIHYDRATE 2 PUFF(S): 160; 4.5 AEROSOL RESPIRATORY (INHALATION) at 08:55

## 2021-06-06 RX ADMIN — CALAMINE AND ZINC OXIDE AND PHENOL 1 APPLICATION(S): 160; 10 LOTION TOPICAL at 05:37

## 2021-06-06 RX ADMIN — CALAMINE AND ZINC OXIDE AND PHENOL 1 APPLICATION(S): 160; 10 LOTION TOPICAL at 17:38

## 2021-06-06 RX ADMIN — Medication 1 PATCH: at 19:00

## 2021-06-06 RX ADMIN — Medication 1 APPLICATION(S): at 05:37

## 2021-06-06 RX ADMIN — Medication 1 PATCH: at 12:20

## 2021-06-06 NOTE — PROGRESS NOTE ADULT - SUBJECTIVE AND OBJECTIVE BOX
Cc: Gait dysfunction    HPI: Patient with no new medical issues today.  Pain controlled, no chest pain, no N/V, no Fevers/Chills. No other new ROS  Has been tolerating rehabilitation program.    acetaminophen   Tablet .. 650 milliGRAM(s) Oral every 6 hours PRN  ALBUTerol    90 MICROgram(s) HFA Inhaler 2 Puff(s) Inhalation every 6 hours PRN  amLODIPine   Tablet 2.5 milliGRAM(s) Oral daily  AQUAPHOR (petrolatum Ointment) 1 Application(s) Topical two times a day  aspirin enteric coated 81 milliGRAM(s) Oral daily  atorvastatin 80 milliGRAM(s) Oral at bedtime  budesonide  80 MICROgram(s)/formoterol 4.5 MICROgram(s) Inhaler 2 Puff(s) Inhalation two times a day  calamine/zinc oxide Lotion 1 Application(s) Topical two times a day  clopidogrel Tablet 75 milliGRAM(s) Oral daily  enoxaparin Injectable 40 milliGRAM(s) SubCutaneous daily  lidocaine   Patch 1 Patch Transdermal daily  melatonin 3 milliGRAM(s) Oral at bedtime PRN  nicotine -  14 mG/24Hr(s) Patch 1 Patch Transdermal daily  nystatin    Suspension 323381 Unit(s) Oral three times a day  polyethylene glycol 3350 17 Gram(s) Oral two times a day PRN  senna 2 Tablet(s) Oral at bedtime  tiotropium 18 MICROgram(s) Capsule 1 Capsule(s) Inhalation daily      T(C): 36.4 (06-06-21 @ 08:02), Max: 36.4 (06-06-21 @ 08:02)  HR: 82 (06-06-21 @ 08:57) (66 - 82)  BP: 134/80 (06-06-21 @ 08:02) (124/73 - 134/80)  RR: 15 (06-06-21 @ 08:02) (15 - 15)  SpO2: 95% (06-06-21 @ 08:57) (95% - 97%)    In NAD  HEENT- EOMI  Heart- RRR, S1S2  Lungs- CTA bl.  Abd- + BS, NT  Ext- No calf pain  Neuro- Exam unchanged          Imp: Patient with diagnosis of acute right lacunar infarct admitted for comprehensive acute rehabilitation.    Plan:  - Continue PT/OT/SLP  - DVT prophylaxis  - Skin- Turn q2h, check skin daily  - Continue current medications; patient medically stable.   -Active issues- Monitor BP  - Patient is stable to continue current rehabilitation program.

## 2021-06-07 LAB
ANION GAP SERPL CALC-SCNC: 6 MMOL/L — SIGNIFICANT CHANGE UP (ref 5–17)
BUN SERPL-MCNC: 13 MG/DL — SIGNIFICANT CHANGE UP (ref 7–23)
CALCIUM SERPL-MCNC: 8.9 MG/DL — SIGNIFICANT CHANGE UP (ref 8.4–10.5)
CHLORIDE SERPL-SCNC: 105 MMOL/L — SIGNIFICANT CHANGE UP (ref 96–108)
CO2 SERPL-SCNC: 30 MMOL/L — SIGNIFICANT CHANGE UP (ref 22–31)
CREAT SERPL-MCNC: 0.97 MG/DL — SIGNIFICANT CHANGE UP (ref 0.5–1.3)
GLUCOSE SERPL-MCNC: 109 MG/DL — HIGH (ref 70–99)
HCT VFR BLD CALC: 46.2 % — SIGNIFICANT CHANGE UP (ref 39–50)
HGB BLD-MCNC: 15 G/DL — SIGNIFICANT CHANGE UP (ref 13–17)
MCHC RBC-ENTMCNC: 27.4 PG — SIGNIFICANT CHANGE UP (ref 27–34)
MCHC RBC-ENTMCNC: 32.5 GM/DL — SIGNIFICANT CHANGE UP (ref 32–36)
MCV RBC AUTO: 84.5 FL — SIGNIFICANT CHANGE UP (ref 80–100)
NRBC # BLD: 0 /100 WBCS — SIGNIFICANT CHANGE UP (ref 0–0)
PLATELET # BLD AUTO: 464 K/UL — HIGH (ref 150–400)
POTASSIUM SERPL-MCNC: 4 MMOL/L — SIGNIFICANT CHANGE UP (ref 3.5–5.3)
POTASSIUM SERPL-SCNC: 4 MMOL/L — SIGNIFICANT CHANGE UP (ref 3.5–5.3)
RBC # BLD: 5.47 M/UL — SIGNIFICANT CHANGE UP (ref 4.2–5.8)
RBC # FLD: 12.9 % — SIGNIFICANT CHANGE UP (ref 10.3–14.5)
SODIUM SERPL-SCNC: 141 MMOL/L — SIGNIFICANT CHANGE UP (ref 135–145)
WBC # BLD: 9.2 K/UL — SIGNIFICANT CHANGE UP (ref 3.8–10.5)
WBC # FLD AUTO: 9.2 K/UL — SIGNIFICANT CHANGE UP (ref 3.8–10.5)

## 2021-06-07 PROCEDURE — 99232 SBSQ HOSP IP/OBS MODERATE 35: CPT

## 2021-06-07 RX ADMIN — CALAMINE AND ZINC OXIDE AND PHENOL 1 APPLICATION(S): 160; 10 LOTION TOPICAL at 17:27

## 2021-06-07 RX ADMIN — CLOPIDOGREL BISULFATE 75 MILLIGRAM(S): 75 TABLET, FILM COATED ORAL at 11:33

## 2021-06-07 RX ADMIN — BUDESONIDE AND FORMOTEROL FUMARATE DIHYDRATE 2 PUFF(S): 160; 4.5 AEROSOL RESPIRATORY (INHALATION) at 21:07

## 2021-06-07 RX ADMIN — ATORVASTATIN CALCIUM 80 MILLIGRAM(S): 80 TABLET, FILM COATED ORAL at 21:23

## 2021-06-07 RX ADMIN — Medication 1 PATCH: at 07:40

## 2021-06-07 RX ADMIN — Medication 500000 UNIT(S): at 14:41

## 2021-06-07 RX ADMIN — BUDESONIDE AND FORMOTEROL FUMARATE DIHYDRATE 2 PUFF(S): 160; 4.5 AEROSOL RESPIRATORY (INHALATION) at 09:01

## 2021-06-07 RX ADMIN — Medication 1 PATCH: at 19:26

## 2021-06-07 RX ADMIN — AMLODIPINE BESYLATE 2.5 MILLIGRAM(S): 2.5 TABLET ORAL at 05:18

## 2021-06-07 RX ADMIN — ENOXAPARIN SODIUM 40 MILLIGRAM(S): 100 INJECTION SUBCUTANEOUS at 11:35

## 2021-06-07 RX ADMIN — Medication 1 PATCH: at 07:34

## 2021-06-07 RX ADMIN — Medication 1 APPLICATION(S): at 17:27

## 2021-06-07 RX ADMIN — Medication 1 APPLICATION(S): at 05:19

## 2021-06-07 RX ADMIN — SENNA PLUS 2 TABLET(S): 8.6 TABLET ORAL at 21:23

## 2021-06-07 RX ADMIN — TIOTROPIUM BROMIDE 1 CAPSULE(S): 18 CAPSULE ORAL; RESPIRATORY (INHALATION) at 09:01

## 2021-06-07 RX ADMIN — CALAMINE AND ZINC OXIDE AND PHENOL 1 APPLICATION(S): 160; 10 LOTION TOPICAL at 05:19

## 2021-06-07 RX ADMIN — Medication 81 MILLIGRAM(S): at 11:33

## 2021-06-07 RX ADMIN — Medication 500000 UNIT(S): at 05:18

## 2021-06-07 RX ADMIN — Medication 500000 UNIT(S): at 21:23

## 2021-06-07 RX ADMIN — Medication 1 PATCH: at 11:34

## 2021-06-07 NOTE — PROGRESS NOTE ADULT - MUSCULOSKELETAL COMMENTS
LEFT    UE - ShAB 4/5, EF 4/5, EE 4/5, WE 4/5,  4/5       RIGHT UE - ShAB 5/5, EF 5/5, EE 5/5, WE 5/5,  5/5     LEFT    LE - HF 5/5, KE 5/5, DF 5/5, PF 5/5     RIGHT LE - HF 5/5, KE 5/5, DF 5/5, PF 5/5

## 2021-06-07 NOTE — PROGRESS NOTE ADULT - CONSTITUTIONAL COMMENTS
Patient was seen and examined this AM during OT session. Patient very motivated to get better; explains that he has been exercising and feels like he is "getting better every day". Reports no complaints. Denied CP, palpitations, abdominal pain, or constipation; last BM yesterday.

## 2021-06-07 NOTE — CHART NOTE - NSCHARTNOTEFT_GEN_A_CORE
REHABILITATION DIAGNOSIS:  Cerebral infarction (lacunar)        COMORBIDITIES/COMPLICATING CONDITIONS IMPACTING REHABILITATION:  HEALTH ISSUES - PROBLEM Dx:    left hemiparesis  incoordination  uncontrolled HTN  dysphagia  oral thrush    PAST MEDICAL & SURGICAL HISTORY:  Chronic obstructive pulmonary disease, unspecified COPD type    Current smoker    Borderline diabetic    No significant past surgical history        Based upon consideration of the patient's impairments, functional status, complicating conditions and any other contributing factors and after information garnered from the assessments of all therapy disciplines involved in treating the patient and other pertinent clinicians:    INTERDISCIPLINARY REHABILITATION INTERVENTIONS:    [ x  ] Transfer Training  [ x  ] Bed Mobility  [  x ] Therapeutic Exercise  [  x ] Balance/Coordination Exercises  [  x ] Locomotion training  [ x  ] Stairs    [ x  ] Functional transfers  [ x  ] Activities of daily living  [   ] Visual Perceptual training    [  x ] Bowel/Bladder program  [   ] Pain Management  [   ] Skin/Wound Care    [  x ] Speech/Communication Exercise  [ x  ] Swallowing Exercises    [   ] Cognitive Exercises  [  x ] Cognitive-linguistic Treatment  [   ] Behavioral Program    [   ] Patient/Family Counseling  [   ] Family Training  [   ] Community Re-entry  [   ] Orthotic Evaluation/Training  [   ] Prosthetic Eval/Training    MEDICAL PROGNOSIS:  good    REHAB POTENTIAL:  excellent  EXPECTED DAILY THERAPIES:    [ x  ] PT  [ x  ] OT  [ x  ] ST    EXPECTED INTENSITY OF PROGRAM:  3 hours daily    EXPECTED FREQUENCY OF PROGRAM:  5 x week    ESTIMATED LOS:  14 days    ESTIMATED DISPOSITION:  home with outpatient referral    INTERDISCIPLINARY FUNCTIONAL OUTCOMES/GOALS:         Gait/Mobility: mod independent       Transfers: mod independent       ADLs: mod independent       Functional Transfers: mod independent       Medication Management: supervision       Communication: independent       Cognitive: supervision iADLs       Nutrition: regular solids and thin liquids       Bladder: continent       Bowel: continent

## 2021-06-07 NOTE — PROGRESS NOTE ADULT - ASSESSMENT
BRIANNE SCOTT is a 71M with PMHx of prediabetes, COPD, and 1/2ppd smoker, who presented to [facility] 05/25/2021 with lacute right lacunar infarct, left hemiparesis and dysphagia, dysarthria, ataxia. Admitted for multidisciplinary rehab program.      #Acute right lacunar infarct  - ASA/Plavix for 90 days, then continue on ASA only. Last day Plavix 8/25 (day 1: 5/28)  - atorvastatin 80mg qhs  - PT/OT/ SLP 3 hours a day 5 days a week  - Precautions: respiratory, aspiration, fall    #COPD  - 1/2 ppd smoker; 25 pack years  - Nicotine patch 14mg/24hr x6 weeks, then 7mg/24hr x2 weeks  - symbicort and spiriva  - proventil HFA PRN    #oral thrush:  - start nystatin swish and spit TID  - oral care  - maintain hydration    #Abnormal CT Chest on 5/30  - patchy ground-glass and patchy opacities in left lower lobe  - COVID PCR: neg on 6/2  - Repeat follow up in 8 weeks  - hospitalist follow up    # BP elevated on admission  then 160/90  - norvasc 2.5 daily (started 6/3) - improved to 121/81 this AM    #Prediabetes A1C 6.4  - consistent carb diet  - nutrition consult, diabetic education    #Sleep:  - Maintain quiet hours and low stim environment  - Melatonin PRN    #Pain:  - Tylenol PRN  - Lidocaine patch to left shoulder. Cold compress after therapy  - avoid sedating meds that may affect cognitive recovery    #GI/Bowel:  - Senna 2 tabs qhs and Miralax PRN    #/Bladder:  - Monitor PVR if no void in 8h; SC for >400 cc  - Toileting schedule q4h    #Diet/Dysphagia:    - Diet: carb consistent; soft with nectar-consistency liquids  - ongoing SLP assessment  - Nutrition to follow    #Skin/ Pressure Injury Prevention:  - assessment on admission: intact. Aquaphor to dry skin heels bilaterally  - Turn Q2hrs in bed while awake, OOB to Chair, PT/OT/SLP     #DVT prophylaxis:  - Lovenox  - SCDs    #IDR 06/07/2021  - Current status: supervision-setup with eating, Melissa UE dressing, modA LE dressing, CG transfers, CG 80' w/o AD, 12 steps Melissa, soft diet, mild higher level cognitive deficits, has good insight, MBS later this week  - Goals: Regan for ADLs, independent for functional transfers and ambulation  - TDD: 6/17 with outpatient PT/OT/SLP    #LABS  CBC BMP 6/10 BRIANNE SCOTT is a 71M with PMHx of prediabetes, COPD, and 1/2ppd smoker, who presented to [facility] 05/25/2021 with lacute right lacunar infarct, left hemiparesis and dysphagia, dysarthria, ataxia. Admitted for multidisciplinary rehab program.    #Acute right lacunar infarct  - ASA/Plavix for 90 days, then continue on ASA only. Last day Plavix 8/25 (day 1: 5/28)  - atorvastatin 80mg qhs  - continue comprehensive rehab program PT/OT/ SLP 3 hours a day 5 days a week  - Precautions: respiratory, aspiration, fall    #COPD  - 1/2 ppd smoker; 25 pack years  - Nicotine patch 14mg/24hr x6 weeks, then 7mg/24hr x2 weeks  - symbicort and spiriva  - proventil HFA PRN    #oral thrush:  - s/p nystatin swish and spit TID  - oral care  - maintain hydration    #Abnormal CT Chest on 5/30  - patchy ground-glass and patchy opacities in left lower lobe  - COVID PCR: neg on 6/2  - Repeat follow up in 8 weeks    # BP elevated on admission  then 160/90  - norvasc 2.5 daily (started 6/3) - improved to 121/81 this AM    #Prediabetes A1C 6.4  - consistent carb diet  - FS controlled, dc    #Sleep:  - Maintain quiet hours and low stim environment  - Melatonin PRN    #Pain:  - Tylenol PRN  - Lidocaine patch to left shoulder. Cold compress after therapy    #GI/Bowel:  - Senna 2 tabs qhs and Miralax PRN      #Diet:  - carb consistent; soft with nectar-consistency liquids  - BUN/Cr 13/0.97 6/7, adequately hydrated    #Skin/ Pressure Injury Prevention:  - assessment on admission: intact. Aquaphor to dry skin heels bilaterally  - Turn Q2hrs in bed while awake, OOB to Chair, PT/OT/SLP     #DVT prophylaxis:  - Lovenox  - SCDs    #Case discussed in IDT rounds 06/07/2021  - Current status: supervision-setup with eating, Melissa UE dressing, modA LE dressing, CG transfers, CG 80' w/o AD, 12 steps Melissa, soft diet, mild higher level cognitive deficits, has good insight, MBS later this week  - Goals: Regan for ADLs, independent for functional transfers and ambulation  - TDD: 6/17 with outpatient PT/OT/SLP    #LABS  CBC BMP 6/10  monitor BP

## 2021-06-07 NOTE — PROGRESS NOTE ADULT - SUBJECTIVE AND OBJECTIVE BOX
Patient is a 71y old  Male who presents with a chief complaint of right lacunar CVA (06 Jun 2021 13:37)      Patient seen and examined at bedside.  No overnight events  No complaints this morning    ALLERGIES:  eggs (Pruritus)  Milk (Pruritus)  No Known Drug Allergies    MEDICATIONS  (STANDING):  amLODIPine   Tablet 2.5 milliGRAM(s) Oral daily  AQUAPHOR (petrolatum Ointment) 1 Application(s) Topical two times a day  aspirin enteric coated 81 milliGRAM(s) Oral daily  atorvastatin 80 milliGRAM(s) Oral at bedtime  budesonide  80 MICROgram(s)/formoterol 4.5 MICROgram(s) Inhaler 2 Puff(s) Inhalation two times a day  calamine/zinc oxide Lotion 1 Application(s) Topical two times a day  clopidogrel Tablet 75 milliGRAM(s) Oral daily  enoxaparin Injectable 40 milliGRAM(s) SubCutaneous daily  lidocaine   Patch 1 Patch Transdermal daily  nicotine -  14 mG/24Hr(s) Patch 1 Patch Transdermal daily  nystatin    Suspension 254382 Unit(s) Oral three times a day  senna 2 Tablet(s) Oral at bedtime  tiotropium 18 MICROgram(s) Capsule 1 Capsule(s) Inhalation daily    MEDICATIONS  (PRN):  acetaminophen   Tablet .. 650 milliGRAM(s) Oral every 6 hours PRN Temp greater or equal to 38C (100.4F), Mild Pain (1 - 3)  ALBUTerol    90 MICROgram(s) HFA Inhaler 2 Puff(s) Inhalation every 6 hours PRN Shortness of Breath and/or Wheezing  melatonin 3 milliGRAM(s) Oral at bedtime PRN Insomnia  polyethylene glycol 3350 17 Gram(s) Oral two times a day PRN Constipation    Vital Signs Last 24 Hrs  T(F): 97.2 (07 Jun 2021 07:38), Max: 97.7 (06 Jun 2021 19:38)  HR: 89 (07 Jun 2021 07:38) (81 - 94)  BP: 121/81 (07 Jun 2021 07:38) (121/76 - 154/80)  RR: 15 (07 Jun 2021 07:38) (15 - 15)  SpO2: 95% (07 Jun 2021 07:38) (95% - 96%)  I&O's Summary    06 Jun 2021 07:01  -  07 Jun 2021 07:00  --------------------------------------------------------  IN: 0 mL / OUT: 450 mL / NET: -450 mL      BMI (kg/m2): 21.7 (06-04-21 @ 23:36)    PHYSICAL EXAM:  GENERAL: NAD  HENT:  Atraumatic, Normocephalic; No tonsillar erythema, exudates, or enlargement; Moist mucous membranes;   EYES: EOMI, PERRLA, conjunctiva and sclera clear, no lid-lag  NECK: Supple, No JVD, Normal thyroid  CHEST/LUNG: Clear to percussion bilaterally; No rales, rhonchi, wheezing, or rubs; normal respiratory effort, no intercostal retractions  HEART: Regular rate and rhythm; No murmurs, rubs, or gallops  ABDOMEN: Soft, Nontender, Nondistended; Bowel sounds present; No HSM  MUSCULOSKELETAL/EXTREMITIES:  2+ Peripheral Pulses, No clubbing, cyanosis, or peripheral edema; No digital cyanosis  PSYCH: Appropriate affect, Alert & Awake x 3    LABS:                        15.0   9.20  )-----------( 464      ( 07 Jun 2021 05:30 )             46.2       06-07    141  |  105  |  13  ----------------------------<  109  4.0   |  30  |  0.97    Ca    8.9      07 Jun 2021 05:30       eGFR if Non African American: 78 mL/min/1.73M2 (06-07-21 @ 05:30)  eGFR if African American: 90 mL/min/1.73M2 (06-07-21 @ 05:30)      05-26 Chol 166 mg/dL LDL -- HDL 50 mg/dL Trig 149 mg/dL    POCT Blood Glucose.: 111 mg/dL (07 Jun 2021 08:20)  POCT Blood Glucose.: 96 mg/dL (06 Jun 2021 08:49)          COVID-19 PCR: NotDetec (06-03-21 @ 15:48)  COVID-19 PCR: NotDetec (06-02-21 @ 18:35)  COVID-19 PCR: NotDetec (05-30-21 @ 10:13)  COVID-19 PCR: NotDetec (05-25-21 @ 19:18)      Care Discussed with Consultants/Other Providers: Yes

## 2021-06-07 NOTE — PROGRESS NOTE ADULT - SUBJECTIVE AND OBJECTIVE BOX
DAILY PROGRESS NOTE:  HPI:  BRIANNE SCOTT is a 71M LH-dominant with PMHx of prediabetes, COPD ( 1/2ppd smoker), who presented to [facility] 05/25/2021 with left-sided weakness. He experienced a similar episode several years ago when he was evaluated for a stroke and was informed he had imbalance in his ears and given medication for 3 days with resolution of symptoms. CT head and CT angio head/neck on admission negative for acute pathology. MR brain with findings c/w acute right lacunar infarct. He was started on ASA/Plavix. He failed his initial dysphagia screen, but passed MBS on 5/27, cleared for soft with nectar-thick fluids.     Hospital course was otherwise unremarkable. Patient was evaluated by PM&R and therapy for functional deficits and gait/ ADL impairments and recommended acute rehabilitation. Patient was medically optimized for discharge to [facility] on 06/03/2021. (03 Jun 2021 13:29)      Recent Labs/Imaging:                        15.0   9.20  )-----------( 464      ( 07 Jun 2021 05:30 )             46.2     06-07    141  |  105  |  13  ----------------------------<  109<H>  4.0   |  30  |  0.97    Ca    8.9      07 Jun 2021 05:30  POCT Blood Glucose.: 111 mg/dL (06-07-21 @ 08:20)      Medications:  acetaminophen   Tablet .. 650 milliGRAM(s) Oral every 6 hours PRN  ALBUTerol    90 MICROgram(s) HFA Inhaler 2 Puff(s) Inhalation every 6 hours PRN  amLODIPine   Tablet 2.5 milliGRAM(s) Oral daily  AQUAPHOR (petrolatum Ointment) 1 Application(s) Topical two times a day  aspirin enteric coated 81 milliGRAM(s) Oral daily  atorvastatin 80 milliGRAM(s) Oral at bedtime  budesonide  80 MICROgram(s)/formoterol 4.5 MICROgram(s) Inhaler 2 Puff(s) Inhalation two times a day  calamine/zinc oxide Lotion 1 Application(s) Topical two times a day  clopidogrel Tablet 75 milliGRAM(s) Oral daily  enoxaparin Injectable 40 milliGRAM(s) SubCutaneous daily  lidocaine   Patch 1 Patch Transdermal daily  melatonin 3 milliGRAM(s) Oral at bedtime PRN  nicotine -  14 mG/24Hr(s) Patch 1 Patch Transdermal daily  nystatin    Suspension 818204 Unit(s) Oral three times a day  polyethylene glycol 3350 17 Gram(s) Oral two times a day PRN  senna 2 Tablet(s) Oral at bedtime  tiotropium 18 MICROgram(s) Capsule 1 Capsule(s) Inhalation daily      Physical Exam:  Vital Signs Last 24 Hrs  T(C): 36.2 (07 Jun 2021 07:38), Max: 36.5 (06 Jun 2021 19:38)  T(F): 97.2 (07 Jun 2021 07:38), Max: 97.7 (06 Jun 2021 19:38)  HR: 89 (07 Jun 2021 07:38) (81 - 94)  BP: 121/81 (07 Jun 2021 07:38) (121/76 - 154/80)  BP(mean): --  RR: 15 (07 Jun 2021 07:38) (15 - 15)  SpO2: 95% (07 Jun 2021 07:38) (95% - 96%)    06-06-21 @ 07:01  -  06-07-21 @ 07:00  --------------------------------------------------------  IN: 0 mL / OUT: 450 mL / NET: -450 mL     DAILY PROGRESS NOTE:  HPI:  BRIANNE SCOTT is a 71M LH-dominant with PMHx of prediabetes, COPD ( 1/2ppd smoker), who presented to [facility] 05/25/2021 with left-sided weakness. He experienced a similar episode several years ago when he was evaluated for a stroke and was informed he had imbalance in his ears and given medication for 3 days with resolution of symptoms. CT head and CT angio head/neck on admission negative for acute pathology. MR brain with findings c/w acute right lacunar infarct. He was started on ASA/Plavix. He failed his initial dysphagia screen, but passed MBS on 5/27, cleared for soft with nectar-thick fluids.     Hospital course was otherwise unremarkable. Patient was evaluated by PM&R and therapy for functional deficits and gait/ ADL impairments and recommended acute rehabilitation. Patient was medically optimized for discharge to [facility] on 06/03/2021. (03 Jun 2021 13:29)      Recent Labs/Imaging:                        15.0   9.20  )-----------( 464      ( 07 Jun 2021 05:30 )             46.2     06-07    141  |  105  |  13  ----------------------------<  109<H>  4.0   |  30  |  0.97    Ca    8.9      07 Jun 2021 05:30  POCT Blood Glucose.: 111 mg/dL (06-07-21 @ 08:20)      Medications:  acetaminophen   Tablet .. 650 milliGRAM(s) Oral every 6 hours PRN  ALBUTerol    90 MICROgram(s) HFA Inhaler 2 Puff(s) Inhalation every 6 hours PRN  amLODIPine   Tablet 2.5 milliGRAM(s) Oral daily  AQUAPHOR (petrolatum Ointment) 1 Application(s) Topical two times a day  aspirin enteric coated 81 milliGRAM(s) Oral daily  atorvastatin 80 milliGRAM(s) Oral at bedtime  budesonide  80 MICROgram(s)/formoterol 4.5 MICROgram(s) Inhaler 2 Puff(s) Inhalation two times a day  calamine/zinc oxide Lotion 1 Application(s) Topical two times a day  clopidogrel Tablet 75 milliGRAM(s) Oral daily  enoxaparin Injectable 40 milliGRAM(s) SubCutaneous daily  lidocaine   Patch 1 Patch Transdermal daily  melatonin 3 milliGRAM(s) Oral at bedtime PRN  nicotine -  14 mG/24Hr(s) Patch 1 Patch Transdermal daily  nystatin    Suspension 363185 Unit(s) Oral three times a day  polyethylene glycol 3350 17 Gram(s) Oral two times a day PRN  senna 2 Tablet(s) Oral at bedtime  tiotropium 18 MICROgram(s) Capsule 1 Capsule(s) Inhalation daily      Physical Exam:  Vital Signs Last 24 Hrs  T(C): 36.2 (07 Jun 2021 07:38), Max: 36.5 (06 Jun 2021 19:38)  T(F): 97.2 (07 Jun 2021 07:38), Max: 97.7 (06 Jun 2021 19:38)  HR: 89 (07 Jun 2021 07:38) (81 - 94)  BP: 121/81 (07 Jun 2021 07:38) (121/76 - 154/80)  BP(mean): --  RR: 15 (07 Jun 2021 07:38) (15 - 15)  SpO2: 95% (07 Jun 2021 07:38) (95% - 96%)    06-06-21 @ 07:01  -  06-07-21 @ 07:00  --------------------------------------------------------  IN: 0 mL / OUT: 450 mL / NET: -450 mL

## 2021-06-07 NOTE — PROGRESS NOTE ADULT - COMMENTS
Patient seen in OT. Stable, denies any H/A, no difficulty sleeping , no B/B complaints. Continues to be motivated. Rash , Tx with calamine lotion over weekend, improved. FS have been well controlled, accuchecks were also dc'd

## 2021-06-07 NOTE — PROGRESS NOTE ADULT - ASSESSMENT
71-year-old man with history of prediabetes and COPD admitted to [facility] after hospital course for acute CVA.    Acute CVA (right lacunar infarct) resulting in Impaired gait, mobility, ADL  Dysphagia  - Comprehensive rehab program of PT/OT/SLP - 3 hours a day, 5 days a week  - Fall precautions  - Continue statin  - Continue Plavix 75mg daily for 90 days and ASA 81mg daily indefinitely  - Bowel regimen as per primary team  - Pain meds as per primary team     Prediabetes   - Stable  - A1C 6.3 (5/26/21)  - Diabetes Diet    COPD - stable  Current Smoker  - Smoking cessation given. Chantix at home  - Continue nicotine patch (14mg/24 hr patch for 6 weeks total then 7mg/24 hr patch for two more weeks afterwrards)  - Continue Spiriva, Symbicort, and PRN albuterol    Hypertension - stable  - Continue amlodipine  - Consider ACEi since Prediabetes if BP elevates  - Monitor BP    Elevated ALT  - monitor for now    Thrombocytosis  - monitor for now    DVT ppx - lovenox

## 2021-06-08 PROCEDURE — 99232 SBSQ HOSP IP/OBS MODERATE 35: CPT

## 2021-06-08 RX ADMIN — Medication 500000 UNIT(S): at 21:13

## 2021-06-08 RX ADMIN — CLOPIDOGREL BISULFATE 75 MILLIGRAM(S): 75 TABLET, FILM COATED ORAL at 12:03

## 2021-06-08 RX ADMIN — ENOXAPARIN SODIUM 40 MILLIGRAM(S): 100 INJECTION SUBCUTANEOUS at 12:03

## 2021-06-08 RX ADMIN — CALAMINE AND ZINC OXIDE AND PHENOL 1 APPLICATION(S): 160; 10 LOTION TOPICAL at 05:57

## 2021-06-08 RX ADMIN — Medication 500000 UNIT(S): at 13:21

## 2021-06-08 RX ADMIN — ATORVASTATIN CALCIUM 80 MILLIGRAM(S): 80 TABLET, FILM COATED ORAL at 21:13

## 2021-06-08 RX ADMIN — Medication 1 PATCH: at 12:30

## 2021-06-08 RX ADMIN — CALAMINE AND ZINC OXIDE AND PHENOL 1 APPLICATION(S): 160; 10 LOTION TOPICAL at 18:10

## 2021-06-08 RX ADMIN — Medication 1 PATCH: at 18:19

## 2021-06-08 RX ADMIN — Medication 500000 UNIT(S): at 05:56

## 2021-06-08 RX ADMIN — BUDESONIDE AND FORMOTEROL FUMARATE DIHYDRATE 2 PUFF(S): 160; 4.5 AEROSOL RESPIRATORY (INHALATION) at 21:29

## 2021-06-08 RX ADMIN — Medication 1 APPLICATION(S): at 05:57

## 2021-06-08 RX ADMIN — Medication 1 APPLICATION(S): at 18:10

## 2021-06-08 RX ADMIN — Medication 1 PATCH: at 12:02

## 2021-06-08 RX ADMIN — Medication 1 PATCH: at 07:42

## 2021-06-08 RX ADMIN — AMLODIPINE BESYLATE 2.5 MILLIGRAM(S): 2.5 TABLET ORAL at 05:56

## 2021-06-08 RX ADMIN — Medication 81 MILLIGRAM(S): at 12:02

## 2021-06-08 NOTE — PROGRESS NOTE ADULT - SUBJECTIVE AND OBJECTIVE BOX
DAILY PROGRESS NOTE:  HPI:  BRIANNE SCOTT is a 71M LH-dominant with PMHx of prediabetes, COPD ( 1/2ppd smoker), who presented to [facility] 05/25/2021 with left-sided weakness. He experienced a similar episode several years ago when he was evaluated for a stroke and was informed he had imbalance in his ears and given medication for 3 days with resolution of symptoms. CT head and CT angio head/neck on admission negative for acute pathology. MR brain with findings c/w acute right lacunar infarct. He was started on ASA/Plavix. He failed his initial dysphagia screen, but passed MBS on 5/27, cleared for soft with nectar-thick fluids.     Hospital course was otherwise unremarkable. Patient was evaluated by PM&R and therapy for functional deficits and gait/ ADL impairments and recommended acute rehabilitation. Patient was medically optimized for discharge to [facility] on 06/03/2021. (03 Jun 2021 13:29)      Recent Labs/Imaging:                        15.0   9.20  )-----------( 464      ( 07 Jun 2021 05:30 )             46.2     06-07    141  |  105  |  13  ----------------------------<  109<H>  4.0   |  30  |  0.97    Ca    8.9      07 Jun 2021 05:30      Medications:  acetaminophen   Tablet .. 650 milliGRAM(s) Oral every 6 hours PRN  ALBUTerol    90 MICROgram(s) HFA Inhaler 2 Puff(s) Inhalation every 6 hours PRN  amLODIPine   Tablet 2.5 milliGRAM(s) Oral daily  AQUAPHOR (petrolatum Ointment) 1 Application(s) Topical two times a day  aspirin enteric coated 81 milliGRAM(s) Oral daily  atorvastatin 80 milliGRAM(s) Oral at bedtime  budesonide  80 MICROgram(s)/formoterol 4.5 MICROgram(s) Inhaler 2 Puff(s) Inhalation two times a day  calamine/zinc oxide Lotion 1 Application(s) Topical two times a day  clopidogrel Tablet 75 milliGRAM(s) Oral daily  enoxaparin Injectable 40 milliGRAM(s) SubCutaneous daily  lidocaine   Patch 1 Patch Transdermal daily  melatonin 3 milliGRAM(s) Oral at bedtime PRN  nicotine -  14 mG/24Hr(s) Patch 1 Patch Transdermal daily  nystatin    Suspension 568234 Unit(s) Oral three times a day  polyethylene glycol 3350 17 Gram(s) Oral two times a day PRN  senna 2 Tablet(s) Oral at bedtime  tiotropium 18 MICROgram(s) Capsule 1 Capsule(s) Inhalation daily      Physical Exam:  Vital Signs Last 24 Hrs  T(C): 36.7 (08 Jun 2021 07:42), Max: 36.7 (08 Jun 2021 07:42)  T(F): 98 (08 Jun 2021 07:42), Max: 98 (08 Jun 2021 07:42)  HR: 88 (08 Jun 2021 07:42) (81 - 92)  BP: 119/69 (08 Jun 2021 07:42) (119/69 - 132/70)  BP(mean): --  RR: 14 (08 Jun 2021 07:42) (14 - 15)  SpO2: 97% (08 Jun 2021 07:42) (93% - 97%)    06-07-21 @ 07:01  -  06-08-21 @ 07:00  --------------------------------------------------------  IN: 0 mL / OUT: 850 mL / NET: -850 mL      Review of Systems:   · Additional ROS	Patient seen in OT. Stable, denies any H/A and no B/B complaints. Continues to be motivated. Rash is improved. Having trouble sleeping at night. Endorsing left shoulder pain with movement.    Physical Exam:   · Constitutional	detailed exam  · Constitutional Details	well-groomed; no distress  · Constitutional Comments	  · Respiratory	detailed exam  · Respiratory Details	breath sounds equal; good air movement; clear to auscultation bilaterally  · Cardiovascular	detailed exam  · Cardiovascular Details	regular rate and rhythm  · Gastrointestinal	detailed exam  · GI Normal	soft; nontender  · Extremities	detailed exam  · Extremities Comments	no LE edema  improved left hand coordination and strength 4+/5  	  · Neurological	detailed exam  · Neurological Details	alert and oriented x 3; sensation intact  · Neurological Comments	FTN impaired on the left with dysmetria  · Musculoskeletal	detailed exam  · Musculoskeletal Comments	L UE - ShAB 4/5, EF 4/5, EE 4/5, WE 4/5,  4/5       R UE - ShAB 5/5, EF 5/5, EE 5/5, WE 5/5,  5/5     L LE - HF 5/5, KE 5/5, DF 5/5, PF 5/5       R LE - HF 5/5, KE 5/5, DF 5/5, PF 5/5  · Psychiatric	Affect and characteristics of appearance, verbalizations, behaviors are appropriate   DAILY PROGRESS NOTE:  HPI:  BRIANNE SCOTT is a 71M LH-dominant with PMHx of prediabetes, COPD ( 1/2ppd smoker), who presented to [facility] 05/25/2021 with left-sided weakness. He experienced a similar episode several years ago when he was evaluated for a stroke and was informed he had imbalance in his ears and given medication for 3 days with resolution of symptoms. CT head and CT angio head/neck on admission negative for acute pathology. MR brain with findings c/w acute right lacunar infarct. He was started on ASA/Plavix. He failed his initial dysphagia screen, but passed MBS on 5/27, cleared for soft with nectar-thick fluids.     Hospital course was otherwise unremarkable. Patient was evaluated by PM&R and therapy for functional deficits and gait/ ADL impairments and recommended acute rehabilitation. Patient was medically optimized for discharge to [facility] on 06/03/2021. (03 Jun 2021 13:29)      Recent Labs/Imaging:                        15.0   9.20  )-----------( 464      ( 07 Jun 2021 05:30 )             46.2     06-07    141  |  105  |  13  ----------------------------<  109<H>  4.0   |  30  |  0.97    Ca    8.9      07 Jun 2021 05:30      Medications:  acetaminophen   Tablet .. 650 milliGRAM(s) Oral every 6 hours PRN  ALBUTerol    90 MICROgram(s) HFA Inhaler 2 Puff(s) Inhalation every 6 hours PRN  amLODIPine   Tablet 2.5 milliGRAM(s) Oral daily  AQUAPHOR (petrolatum Ointment) 1 Application(s) Topical two times a day  aspirin enteric coated 81 milliGRAM(s) Oral daily  atorvastatin 80 milliGRAM(s) Oral at bedtime  budesonide  80 MICROgram(s)/formoterol 4.5 MICROgram(s) Inhaler 2 Puff(s) Inhalation two times a day  calamine/zinc oxide Lotion 1 Application(s) Topical two times a day  clopidogrel Tablet 75 milliGRAM(s) Oral daily  enoxaparin Injectable 40 milliGRAM(s) SubCutaneous daily  lidocaine   Patch 1 Patch Transdermal daily  melatonin 3 milliGRAM(s) Oral at bedtime PRN  nicotine -  14 mG/24Hr(s) Patch 1 Patch Transdermal daily  nystatin    Suspension 270016 Unit(s) Oral three times a day  polyethylene glycol 3350 17 Gram(s) Oral two times a day PRN  senna 2 Tablet(s) Oral at bedtime  tiotropium 18 MICROgram(s) Capsule 1 Capsule(s) Inhalation daily      Physical Exam:  Vital Signs Last 24 Hrs  T(C): 36.7 (08 Jun 2021 07:42), Max: 36.7 (08 Jun 2021 07:42)  T(F): 98 (08 Jun 2021 07:42), Max: 98 (08 Jun 2021 07:42)  HR: 88 (08 Jun 2021 07:42) (81 - 92)  BP: 119/69 (08 Jun 2021 07:42) (119/69 - 132/70)  BP(mean): --  RR: 14 (08 Jun 2021 07:42) (14 - 15)  SpO2: 97% (08 Jun 2021 07:42) (93% - 97%)    06-07-21 @ 07:01  -  06-08-21 @ 07:00  --------------------------------------------------------  IN: 0 mL / OUT: 850 mL / NET: -850 mL      Review of Systems:   · Additional ROS	Patient seen in OT. Stable, denies any H/A and no B/B complaints. Continues to be motivated. Rash is improved. Having trouble sleeping at night. Endorsing left shoulder pain with movement.    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 mood stable O x 3  · Respiratory	detailed exam  · Respiratory Details	breath sounds equal; good air movement; clear to auscultation bilaterally  · Cardiovascular	detailed exam  · Cardiovascular Details	regular rate and rhythm  · Gastrointestinal	detailed exam  · GI Normal	soft; nontender  · Extremities	detailed exam  · Extremities Comments	no LE edema  improved left hand coordination and strength 4+/5  	  · Neurological	detailed exam  · Neurological Details	alert and oriented x 3; sensation intact  · Neurological Comments	FTN impaired on the left with dysmetria  · Musculoskeletal	detailed exam  	mild tightness rotator cuff muscles teddy with ER/ext movements  no significant impingement signs

## 2021-06-08 NOTE — PROGRESS NOTE ADULT - ASSESSMENT
BIRANNE SCOTT is a 71M with PMHx of prediabetes, COPD, and 1/2ppd smoker, who presented to [facility] 05/25/2021 with lacute right lacunar infarct, left hemiparesis and dysphagia, dysarthria, ataxia. Admitted for multidisciplinary rehab program.    #Acute right lacunar infarct  - ASA/Plavix for 90 days, then continue on ASA only. Last day Plavix 8/25 (day 1: 5/28)  - atorvastatin 80mg qhs  - continue comprehensive rehab program PT/OT/ SLP 3 hours a day 5 days a week  - Precautions: respiratory, aspiration, fall    #COPD  - 1/2 ppd smoker; 25 pack years  - Nicotine patch 14mg/24hr x6 weeks, then 7mg/24hr x2 weeks  - symbicort and spiriva  - proventil HFA PRN    #oral thrush:  - s/p nystatin swish and spit TID  - oral care  - maintain hydration    #Left-sided shoulder pain  - has chronic left-sided shoulder pain, but appears to have gotten worse since stroke  - likely exacerbation of existing pain 2/2 imbalance from compensation  - stretch during therapy and if no improvements, will consider medications  - cold compress after therapy    #Abnormal CT Chest on 5/30  - patchy ground-glass and patchy opacities in left lower lobe  - COVID PCR: neg on 6/2  - Repeat follow up in 8 weeks    # BP elevated on admission  then 160/90  - norvasc 2.5 daily (started 6/3) - improved to 119/69 this AM    #Prediabetes A1C 6.4  - consistent carb diet  - FS controlled, dc    #Sleep:  - Maintain quiet hours and low stim environment  - Melatonin PRN    #Pain:  - Tylenol PRN  - Lidocaine patch to left shoulder. Cold compress after therapy    #GI/Bowel:  - Senna 2 tabs qhs and Miralax PRN    #Diet:  - carb consistent; soft with nectar-consistency liquids  - BUN/Cr 13/0.97 6/7, adequately hydrated    #Skin/ Pressure Injury Prevention:  - assessment on admission: intact. Aquaphor to dry skin heels bilaterally  - Turn Q2hrs in bed while awake, OOB to Chair, PT/OT/SLP     #DVT prophylaxis:  - Lovenox  - SCDs    #Case discussed in IDT rounds 06/07/2021  - Current status: supervision-setup with eating, Melissa UE dressing, modA LE dressing, CG transfers, CG 80' w/o AD, 12 steps Melissa, soft diet, mild higher level cognitive deficits, has good insight, MBS later this week  - Goals: Regan for ADLs, independent for functional transfers and ambulation  - TDD: 6/17 with outpatient PT/OT/SLP    #LABS  CBC BMP 6/10  monitor BP BRIANNE SCOTT is a 71M with PMHx of prediabetes, COPD, and 1/2ppd smoker, who presented to [facility] 05/25/2021 with lacute right lacunar infarct, left hemiparesis and dysphagia, dysarthria, ataxia. Admitted for multidisciplinary rehab program.    #Acute right lacunar infarct  - ASA/Plavix for 90 days, then continue on ASA only. Last day Plavix 8/25 (day 1: 5/28)  - atorvastatin 80mg qhs  - continue comprehensive rehab program PT/OT/ SLP 3 hours a day 5 days a week  - Precautions: respiratory, aspiration, fall    #COPD  - 1/2 ppd smoker; 25 pack years. Smoking cessation education  - Nicotine patch 14mg/24hr x6 weeks, then 7mg/24hr x2 weeks  - symbicort and spiriva  - proventil HFA PRN    #oral thrush:  - s/p nystatin swish and spit TID  - oral care  - maintain hydration    # Left-sided shoulder pain  - has chronic left-sided shoulder pain, but appears to have gotten worse since stroke  - likely exacerbation of existing pain 2/2 imbalance from compensation  - stretch during therapy, rotator cuff balance exercises, and if no improvements, will consider medications  - cold compress after therapy    #Abnormal CT Chest on 5/30  - patchy ground-glass and patchy opacities in left lower lobe  - COVID PCR: neg on 6/2  - Repeat follow up in 8 weeks    # BP elevated on admission  then 160/90  - norvasc 2.5 daily (started 6/3)   - improved to 119/69 this AM    #Prediabetes A1C 6.4  - consistent carb diet  - FS controlled, dc    #Sleep:  - Melatonin PRN    #Pain:  - Tylenol PRN  - Lidocaine patch PRN left shoudler    #GI/Bowel:  - Senna 2 tabs qhs and Miralax PRN    #Diet:  - carb consistent; soft with nectar-consistency liquids  - BUN/Cr 13/0.97 6/7, adequately hydrated    #Skin/ Pressure Injury Prevention:  - assessment on admission: intact  - Aquaphor to dry skin heels bilaterally    #DVT prophylaxis:  - Lovenox  - SCDs    #Case discussed in IDT rounds 06/07/2021  - Current status: supervision-setup with eating, Melissa UE dressing, modA LE dressing, CG transfers, CG 80' w/o AD, 12 steps Melissa, soft diet, mild higher level cognitive deficits, has good insight, MBS later this week  - Goals: Regan for ADLs, independent for functional transfers and ambulation  - TDD: 6/17 with outpatient PT/OT/SLP    #LABS  CBC BMP 6/10

## 2021-06-09 PROCEDURE — 99232 SBSQ HOSP IP/OBS MODERATE 35: CPT

## 2021-06-09 RX ADMIN — CALAMINE AND ZINC OXIDE AND PHENOL 1 APPLICATION(S): 160; 10 LOTION TOPICAL at 18:36

## 2021-06-09 RX ADMIN — Medication 1 APPLICATION(S): at 18:36

## 2021-06-09 RX ADMIN — BUDESONIDE AND FORMOTEROL FUMARATE DIHYDRATE 2 PUFF(S): 160; 4.5 AEROSOL RESPIRATORY (INHALATION) at 20:55

## 2021-06-09 RX ADMIN — CLOPIDOGREL BISULFATE 75 MILLIGRAM(S): 75 TABLET, FILM COATED ORAL at 11:55

## 2021-06-09 RX ADMIN — BUDESONIDE AND FORMOTEROL FUMARATE DIHYDRATE 2 PUFF(S): 160; 4.5 AEROSOL RESPIRATORY (INHALATION) at 08:20

## 2021-06-09 RX ADMIN — Medication 1 PATCH: at 11:55

## 2021-06-09 RX ADMIN — ATORVASTATIN CALCIUM 80 MILLIGRAM(S): 80 TABLET, FILM COATED ORAL at 21:07

## 2021-06-09 RX ADMIN — Medication 1 PATCH: at 06:57

## 2021-06-09 RX ADMIN — Medication 1 PATCH: at 18:37

## 2021-06-09 RX ADMIN — Medication 500000 UNIT(S): at 05:23

## 2021-06-09 RX ADMIN — TIOTROPIUM BROMIDE 1 CAPSULE(S): 18 CAPSULE ORAL; RESPIRATORY (INHALATION) at 08:19

## 2021-06-09 RX ADMIN — Medication 1 APPLICATION(S): at 05:23

## 2021-06-09 RX ADMIN — ENOXAPARIN SODIUM 40 MILLIGRAM(S): 100 INJECTION SUBCUTANEOUS at 11:55

## 2021-06-09 RX ADMIN — Medication 81 MILLIGRAM(S): at 11:55

## 2021-06-09 RX ADMIN — CALAMINE AND ZINC OXIDE AND PHENOL 1 APPLICATION(S): 160; 10 LOTION TOPICAL at 05:23

## 2021-06-09 RX ADMIN — Medication 500000 UNIT(S): at 21:07

## 2021-06-09 RX ADMIN — AMLODIPINE BESYLATE 2.5 MILLIGRAM(S): 2.5 TABLET ORAL at 05:23

## 2021-06-09 NOTE — PROGRESS NOTE ADULT - SUBJECTIVE AND OBJECTIVE BOX
Patient is a 71y old  Male who presents with a chief complaint of right lacunar CVA (2021 10:02)      HPI:  BRIANNE SCOTT is a 71M LH-dominant with PMHx of prediabetes, COPD ( 1/2ppd smoker), who presented to [facility] 2021 with left-sided weakness. He experienced a similar episode several years ago when he was evaluated for a stroke and was informed he had imbalance in his ears and given medication for 3 days with resolution of symptoms. CT head and CT angio head/neck on admission negative for acute pathology. MR brain with findings c/w acute right lacunar infarct. He was started on ASA/Plavix. He failed his initial dysphagia screen, but passed MBS on , cleared for soft with nectar-thick fluids.     Hospital course was otherwise unremarkable. Patient was evaluated by PM&R and therapy for functional deficits and gait/ ADL impairments and recommended acute rehabilitation. Patient was medically optimized for discharge to [facility] on 2021. (2021 13:29)      PAST MEDICAL & SURGICAL HISTORY:  Chronic obstructive pulmonary disease, unspecified COPD type    Current smoker    Borderline diabetic    No significant past surgical history        MEDICATIONS  (STANDING):  amLODIPine   Tablet 2.5 milliGRAM(s) Oral daily  AQUAPHOR (petrolatum Ointment) 1 Application(s) Topical two times a day  aspirin enteric coated 81 milliGRAM(s) Oral daily  atorvastatin 80 milliGRAM(s) Oral at bedtime  budesonide  80 MICROgram(s)/formoterol 4.5 MICROgram(s) Inhaler 2 Puff(s) Inhalation two times a day  calamine/zinc oxide Lotion 1 Application(s) Topical two times a day  clopidogrel Tablet 75 milliGRAM(s) Oral daily  enoxaparin Injectable 40 milliGRAM(s) SubCutaneous daily  lidocaine   Patch 1 Patch Transdermal daily  nicotine -  14 mG/24Hr(s) Patch 1 Patch Transdermal daily  nystatin    Suspension 683823 Unit(s) Oral three times a day  senna 2 Tablet(s) Oral at bedtime  tiotropium 18 MICROgram(s) Capsule 1 Capsule(s) Inhalation daily    MEDICATIONS  (PRN):  acetaminophen   Tablet .. 650 milliGRAM(s) Oral every 6 hours PRN Temp greater or equal to 38C (100.4F), Mild Pain (1 - 3)  ALBUTerol    90 MICROgram(s) HFA Inhaler 2 Puff(s) Inhalation every 6 hours PRN Shortness of Breath and/or Wheezing  melatonin 3 milliGRAM(s) Oral at bedtime PRN Insomnia  polyethylene glycol 3350 17 Gram(s) Oral two times a day PRN Constipation      Allergies    eggs (Pruritus)  Milk (Pruritus)  No Known Drug Allergies    Intolerances          VITALS  71y  Vital Signs Last 24 Hrs  T(C): 36.3 (2021 07:18), Max: 36.4 (2021 21:12)  T(F): 97.4 (2021 07:18), Max: 97.6 (2021 21:12)  HR: 89 (2021 09:39) (77 - 89)  BP: 130/69 (2021 07:18) (129/81 - 133/85)  BP(mean): --  RR: 14 (2021 07:18) (14 - 15)  SpO2: 95% (2021 09:39) (95% - 97%)  Daily     Daily Weight in k.8 (2021 23:04)        RECENT LABS:                      CAPILLARY BLOOD GLUCOSE          Review of Systems:   · Additional ROS	Patient sitting in chair, good spirits this morning. Reports continued improvement in movement and functional activities such as dressing. States pain in shoulder has improved after stretching yesterday and slept better as well.     No B/B complaints, no H/A    Physical Exam:   · Constitutional	detailed exam  · Constitutional Details	pleasant, O x 3 NAD  · Constitutional Comments	  · Respiratory	detailed exam  · Respiratory Details	breath sounds equal; clear bilaterally good effort  · Cardiovascular	detailed exam  · Cardiovascular Details	regular rate and rhythm  · Gastrointestinal	detailed exam  · GI Normal	soft; nontender  · Extremities	detailed exam  · Extremities Comments	no LE edema  calves soft and NT  improved AROm shoulder pain free range  	reduced FMC left UE

## 2021-06-09 NOTE — PROGRESS NOTE ADULT - ASSESSMENT
71-year-old man with history of prediabetes and COPD admitted to [facility] after hospital course for acute CVA.    Acute CVA (right lacunar infarct) resulting in Impaired gait, mobility, ADL  Dysphagia  - Comprehensive rehab program of PT/OT/SLP - 3 hours a day, 5 days a week  - Fall precautions  - Continue statin  - Continue Plavix 75mg daily for 90 days and ASA 81mg daily indefinitely  - Bowel regimen as per primary team  - Pain meds as per primary team     Prediabetes   - Stable  - A1C 6.3 (5/26/21)  - Diabetes Diet    COPD - stable  Current Smoker  - Smoking cessation given. Chantix at home  - Continue nicotine patch (14mg/24 hr patch for 6 weeks total then 7mg/24 hr patch for two more weeks afterwrards)  - Continue Spiriva, Symbicort, and PRN albuterol    Hypertension - stable  - Continue amlodipine  - Consider ACEi since Prediabetes if BP elevates  - Monitor BP    Elevated ALT  - monitor for now    Thrombocytosis  - monitor for now    DVT ppx - lovenox

## 2021-06-09 NOTE — PROGRESS NOTE ADULT - SUBJECTIVE AND OBJECTIVE BOX
Patient is a 71y old  Male who presents with a chief complaint of right lacunar CVA (08 Jun 2021 11:28)      Patient seen and examined at bedside.  No overnight events  No complaints this morning    ALLERGIES:  eggs (Pruritus)  Milk (Pruritus)  No Known Drug Allergies    MEDICATIONS  (STANDING):  amLODIPine   Tablet 2.5 milliGRAM(s) Oral daily  AQUAPHOR (petrolatum Ointment) 1 Application(s) Topical two times a day  aspirin enteric coated 81 milliGRAM(s) Oral daily  atorvastatin 80 milliGRAM(s) Oral at bedtime  budesonide  80 MICROgram(s)/formoterol 4.5 MICROgram(s) Inhaler 2 Puff(s) Inhalation two times a day  calamine/zinc oxide Lotion 1 Application(s) Topical two times a day  clopidogrel Tablet 75 milliGRAM(s) Oral daily  enoxaparin Injectable 40 milliGRAM(s) SubCutaneous daily  lidocaine   Patch 1 Patch Transdermal daily  nicotine -  14 mG/24Hr(s) Patch 1 Patch Transdermal daily  nystatin    Suspension 275245 Unit(s) Oral three times a day  senna 2 Tablet(s) Oral at bedtime  tiotropium 18 MICROgram(s) Capsule 1 Capsule(s) Inhalation daily    MEDICATIONS  (PRN):  acetaminophen   Tablet .. 650 milliGRAM(s) Oral every 6 hours PRN Temp greater or equal to 38C (100.4F), Mild Pain (1 - 3)  ALBUTerol    90 MICROgram(s) HFA Inhaler 2 Puff(s) Inhalation every 6 hours PRN Shortness of Breath and/or Wheezing  melatonin 3 milliGRAM(s) Oral at bedtime PRN Insomnia  polyethylene glycol 3350 17 Gram(s) Oral two times a day PRN Constipation    Vital Signs Last 24 Hrs  T(F): 97.4 (09 Jun 2021 07:18), Max: 97.6 (08 Jun 2021 21:12)  HR: 89 (09 Jun 2021 09:39) (77 - 89)  BP: 130/69 (09 Jun 2021 07:18) (129/81 - 133/85)  RR: 14 (09 Jun 2021 07:18) (14 - 15)  SpO2: 95% (09 Jun 2021 09:39) (95% - 97%)  I&O's Summary    08 Jun 2021 07:01  -  09 Jun 2021 07:00  --------------------------------------------------------  IN: 600 mL / OUT: 1050 mL / NET: -450 mL          PHYSICAL EXAM:  GENERAL: NAD  HENT:  Atraumatic, Normocephalic; No tonsillar erythema, exudates, or enlargement; Moist mucous membranes;   EYES: EOMI, PERRLA, conjunctiva and sclera clear, no lid-lag  NECK: Supple, No JVD, Normal thyroid  CHEST/LUNG: Clear to percussion bilaterally; No rales, rhonchi, wheezing, or rubs; normal respiratory effort, no intercostal retractions  HEART: Regular rate and rhythm; No murmurs, rubs, or gallops  ABDOMEN: Soft, Nontender, Nondistended; Bowel sounds present; No HSM  MUSCULOSKELETAL/EXTREMITIES:  2+ Peripheral Pulses, No clubbing, cyanosis, or peripheral edema; No digital cyanosis  PSYCH: Appropriate affect, Alert & Awake x 3    LABS:                        15.0   9.20  )-----------( 464      ( 07 Jun 2021 05:30 )             46.2       06-07    141  |  105  |  13  ----------------------------<  109  4.0   |  30  |  0.97    Ca    8.9      07 Jun 2021 05:30       eGFR if Non African American: 78 mL/min/1.73M2 (06-07-21 @ 05:30)  eGFR if African American: 90 mL/min/1.73M2 (06-07-21 @ 05:30)       05-26 Chol 166 mg/dL LDL -- HDL 50 mg/dL Trig 149 mg/dL      COVID-19 PCR: NotDetec (06-03-21 @ 15:48)  COVID-19 PCR: NotDetec (06-02-21 @ 18:35)  COVID-19 PCR: NotDetec (05-30-21 @ 10:13)  COVID-19 PCR: NotDetec (05-25-21 @ 19:18)      Care Discussed with Consultants/Other Providers: Yes

## 2021-06-09 NOTE — PROGRESS NOTE ADULT - ASSESSMENT
BRIANNE SCOTT is a 71M with PMHx of prediabetes, COPD, and 1/2ppd smoker, who presented to [facility] 05/25/2021 with lacute right lacunar infarct, left hemiparesis and dysphagia, dysarthria, ataxia. Admitted for multidisciplinary rehab program.    #Acute right lacunar infarct  - ASA/Plavix for 90 days (through 8/25), then continue on ASA only  - atorvastatin 80mg qhs  - continue comprehensive rehab program PT/OT/ SLP 3 hours a day 5 days a week  - Precautions: respiratory, aspiration, fall    #COPD  - 1/2 ppd smoker; 25 pack years. Smoking cessation education reiterated with patient 6/9. Patient verbalized awareness, states he knows that he can control smoking and HTN to reduce risk, unlike age. Denies feelings cravings.   - Nicotine patch 14mg/24hr x6 weeks, then 7mg/24hr x2 weeks  - symbicort and spiriva  - proventil HFA PRN    # BP elevated on admission  then 160/90  - norvasc 2.5 daily (started 6/3)   - (129/81 - 133/85) controlled 6/9    #Prediabetes A1C 6.4  - consistent carb diet  - FS controlled, dc    #oral thrush:  - s/p nystatin swish and spit TID    # Left-sided shoulder pain: improved 6/9  - has chronic left-sided shoulder pain, but appears to have gotten worse since stroke. Likely exacerbation of existing pain 2/2 imbalance from compensation  - stretch during therapy, rotator cuff balance exercises  - cold compress after therapy PRN    #Abnormal CT Chest on 5/30  - patchy ground-glass and patchy opacities in left lower lobe  - COVID PCR: neg on 6/2  - Repeat follow up in 8 weeks    #Sleep:  - Melatonin PRN    #Pain:  - Tylenol PRN  - Lidocaine patch PRN left shoulder PRN  - cold compress shoulder PRN    #GI/Bowel:  - Senna 2 tabs qhs and Miralax PRN    #Diet:  - carb consistent; soft with nectar-consistency liquids  - BUN/Cr 13/0.97 6/7, adequately hydrated    #Skin:  - Aquaphor to dry skin heels bilaterally    #DVT prophylaxis:  - Lovenox  - SCDs    #Case discussed in IDT rounds 06/07/2021  - Current status: supervision-setup with eating, Melissa UE dressing, modA LE dressing, CG transfers, CG 80' w/o AD, 12 steps Melissa, soft diet, mild higher level cognitive deficits, has good insight, MBS later this week  - Goals: Regan for ADLs, independent for functional transfers and ambulation  - TDD: 6/17 with outpatient PT/OT/SLP    #LABS  CBC BMP 6/10   BRIANNE SCOTT is a 71M with PMHx of prediabetes, COPD, and 1/2ppd smoker, who presented to [facility] 05/25/2021 with lacute right lacunar infarct, left hemiparesis and dysphagia, dysarthria, ataxia. Admitted for multidisciplinary rehab program.    #Acute right lacunar infarct  - ASA/Plavix for 90 days (through 8/25), then continue on ASA only  - atorvastatin 80mg qhs  - continue comprehensive rehab program PT/OT/ SLP 3 hours a day 5 days a week  - Precautions: respiratory, aspiration, fall    #COPD  - 1/2 ppd smoker; 25 pack years. Smoking cessation education reiterated with patient 6/9. Patient verbalized awareness, states he knows that he can control smoking and HTN to reduce risk, unlike age. Denies feelings cravings.   - Nicotine patch 14mg/24hr x6 weeks, then 7mg/24hr x2 weeks  - symbicort and spiriva  - proventil HFA PRN    # BP elevated on admission  then 160/90  - norvasc 2.5 daily (started 6/3)   - (129/81 - 133/85) controlled 6/9    #Prediabetes A1C 6.4  - consistent carb diet  - FS controlled, dc    #oral thrush:  - s/p nystatin swish and spit TID    # Left-sided shoulder pain: improved 6/9  - has chronic left-sided shoulder pain, but appears to have gotten worse since stroke. Likely exacerbation of existing pain 2/2 imbalance from compensation  - stretch during therapy, rotator cuff balance exercises  - cold compress after therapy PRN    #Abnormal CT Chest on 5/30  - patchy ground-glass and patchy opacities in left lower lobe  - COVID PCR: neg on 6/2  - Repeat follow up in 8 weeks    #Sleep:  - Melatonin PRN    #Pain:  - Tylenol PRN  - Lidocaine patch PRN left shoulder PRN  - cold compress shoulder PRN    #GI/Bowel:  - Senna 2 tabs qhs and Miralax PRN    #Diet:  - carb consistent; soft with nectar-consistency liquids  - BUN/Cr 13/0.97 6/7, adequately hydrated  - MBS 6/10    #Skin:  - Aquaphor to dry skin heels bilaterally    #DVT prophylaxis:  - Lovenox  - SCDs    #Case discussed in IDT rounds 06/07/2021  - Current status: supervision-setup with eating, Melissa UE dressing, modA LE dressing, CG transfers, CG 80' w/o AD, 12 steps Melissa, soft diet, mild higher level cognitive deficits, has good insight, MBS later this week  - Goals: Regan for ADLs, independent for functional transfers and ambulation  - TDD: 6/17 with outpatient PT/OT/SLP    #LABS  CBC BMP 6/10  MBS 6/10

## 2021-06-10 LAB
ANION GAP SERPL CALC-SCNC: 6 MMOL/L — SIGNIFICANT CHANGE UP (ref 5–17)
BUN SERPL-MCNC: 14 MG/DL — SIGNIFICANT CHANGE UP (ref 7–23)
CALCIUM SERPL-MCNC: 8.9 MG/DL — SIGNIFICANT CHANGE UP (ref 8.4–10.5)
CHLORIDE SERPL-SCNC: 104 MMOL/L — SIGNIFICANT CHANGE UP (ref 96–108)
CO2 SERPL-SCNC: 31 MMOL/L — SIGNIFICANT CHANGE UP (ref 22–31)
CREAT SERPL-MCNC: 0.83 MG/DL — SIGNIFICANT CHANGE UP (ref 0.5–1.3)
GLUCOSE SERPL-MCNC: 101 MG/DL — HIGH (ref 70–99)
HCT VFR BLD CALC: 47.4 % — SIGNIFICANT CHANGE UP (ref 39–50)
HGB BLD-MCNC: 15.1 G/DL — SIGNIFICANT CHANGE UP (ref 13–17)
MCHC RBC-ENTMCNC: 27.3 PG — SIGNIFICANT CHANGE UP (ref 27–34)
MCHC RBC-ENTMCNC: 31.9 GM/DL — LOW (ref 32–36)
MCV RBC AUTO: 85.7 FL — SIGNIFICANT CHANGE UP (ref 80–100)
NRBC # BLD: 0 /100 WBCS — SIGNIFICANT CHANGE UP (ref 0–0)
PLATELET # BLD AUTO: 489 K/UL — HIGH (ref 150–400)
POTASSIUM SERPL-MCNC: 4 MMOL/L — SIGNIFICANT CHANGE UP (ref 3.5–5.3)
POTASSIUM SERPL-SCNC: 4 MMOL/L — SIGNIFICANT CHANGE UP (ref 3.5–5.3)
RBC # BLD: 5.53 M/UL — SIGNIFICANT CHANGE UP (ref 4.2–5.8)
RBC # FLD: 13 % — SIGNIFICANT CHANGE UP (ref 10.3–14.5)
SODIUM SERPL-SCNC: 141 MMOL/L — SIGNIFICANT CHANGE UP (ref 135–145)
WBC # BLD: 8.87 K/UL — SIGNIFICANT CHANGE UP (ref 3.8–10.5)
WBC # FLD AUTO: 8.87 K/UL — SIGNIFICANT CHANGE UP (ref 3.8–10.5)

## 2021-06-10 PROCEDURE — 99232 SBSQ HOSP IP/OBS MODERATE 35: CPT

## 2021-06-10 PROCEDURE — 74230 X-RAY XM SWLNG FUNCJ C+: CPT | Mod: 26

## 2021-06-10 RX ORDER — PETROLATUM,WHITE
1 JELLY (GRAM) TOPICAL
Refills: 0 | Status: DISCONTINUED | OUTPATIENT
Start: 2021-06-10 | End: 2021-06-17

## 2021-06-10 RX ORDER — HYDROCORTISONE 1 %
1 OINTMENT (GRAM) TOPICAL
Refills: 0 | Status: COMPLETED | OUTPATIENT
Start: 2021-06-10 | End: 2021-06-15

## 2021-06-10 RX ADMIN — Medication 500000 UNIT(S): at 21:28

## 2021-06-10 RX ADMIN — Medication 1 APPLICATION(S): at 17:46

## 2021-06-10 RX ADMIN — Medication 1 PATCH: at 11:22

## 2021-06-10 RX ADMIN — BUDESONIDE AND FORMOTEROL FUMARATE DIHYDRATE 2 PUFF(S): 160; 4.5 AEROSOL RESPIRATORY (INHALATION) at 08:24

## 2021-06-10 RX ADMIN — BUDESONIDE AND FORMOTEROL FUMARATE DIHYDRATE 2 PUFF(S): 160; 4.5 AEROSOL RESPIRATORY (INHALATION) at 21:29

## 2021-06-10 RX ADMIN — ATORVASTATIN CALCIUM 80 MILLIGRAM(S): 80 TABLET, FILM COATED ORAL at 21:28

## 2021-06-10 RX ADMIN — Medication 500000 UNIT(S): at 11:24

## 2021-06-10 RX ADMIN — Medication 1 PATCH: at 11:23

## 2021-06-10 RX ADMIN — CLOPIDOGREL BISULFATE 75 MILLIGRAM(S): 75 TABLET, FILM COATED ORAL at 11:24

## 2021-06-10 RX ADMIN — CALAMINE AND ZINC OXIDE AND PHENOL 1 APPLICATION(S): 160; 10 LOTION TOPICAL at 05:25

## 2021-06-10 RX ADMIN — Medication 1 PATCH: at 19:27

## 2021-06-10 RX ADMIN — Medication 1 APPLICATION(S): at 05:25

## 2021-06-10 RX ADMIN — Medication 1 PATCH: at 07:49

## 2021-06-10 RX ADMIN — TIOTROPIUM BROMIDE 1 CAPSULE(S): 18 CAPSULE ORAL; RESPIRATORY (INHALATION) at 08:23

## 2021-06-10 RX ADMIN — SENNA PLUS 2 TABLET(S): 8.6 TABLET ORAL at 21:28

## 2021-06-10 RX ADMIN — AMLODIPINE BESYLATE 2.5 MILLIGRAM(S): 2.5 TABLET ORAL at 05:23

## 2021-06-10 RX ADMIN — Medication 81 MILLIGRAM(S): at 11:24

## 2021-06-10 RX ADMIN — Medication 500000 UNIT(S): at 05:23

## 2021-06-10 RX ADMIN — ENOXAPARIN SODIUM 40 MILLIGRAM(S): 100 INJECTION SUBCUTANEOUS at 11:23

## 2021-06-10 NOTE — CHART NOTE - NSCHARTNOTEFT_GEN_A_CORE
NUTRITION FOLLOW UP    SOURCE: Patient [X)   Family [ ]    Medical Record (X)    Diet, Consistent Carbohydrate w/Evening Snack:   DASH/TLC {Sodium & Cholesterol Restricted}  Dysphagia 3, Soft, Nectar Consistency Fluid (DYS3NC) (06-03-21 @ 14:13) [Active]    Pt tolerating diet and eating well- consuming 100% of meals. Reports drinking adequate fluids on nectar thick. Optimal glycemic control noted, POCT d/c'd. Denies GI distress, last BM 6/9.     CURRENT WEIGHT:   (6/9) 126.9lbs    PERTINENT MEDS:   Pertinent Medications: MEDICATIONS  (STANDING):  amLODIPine   Tablet 2.5 milliGRAM(s) Oral daily  AQUAPHOR (petrolatum Ointment) 1 Application(s) Topical two times a day  aspirin enteric coated 81 milliGRAM(s) Oral daily  atorvastatin 80 milliGRAM(s) Oral at bedtime  budesonide  80 MICROgram(s)/formoterol 4.5 MICROgram(s) Inhaler 2 Puff(s) Inhalation two times a day  clopidogrel Tablet 75 milliGRAM(s) Oral daily  enoxaparin Injectable 40 milliGRAM(s) SubCutaneous daily  nicotine -  14 mG/24Hr(s) Patch 1 Patch Transdermal daily  nystatin    Suspension 718287 Unit(s) Oral three times a day  senna 2 Tablet(s) Oral at bedtime  tiotropium 18 MICROgram(s) Capsule 1 Capsule(s) Inhalation daily    MEDICATIONS  (PRN):  acetaminophen   Tablet .. 650 milliGRAM(s) Oral every 6 hours PRN Temp greater or equal to 38C (100.4F), Mild Pain (1 - 3)  ALBUTerol    90 MICROgram(s) HFA Inhaler 2 Puff(s) Inhalation every 6 hours PRN Shortness of Breath and/or Wheezing  melatonin 3 milliGRAM(s) Oral at bedtime PRN Insomnia  polyethylene glycol 3350 17 Gram(s) Oral two times a day PRN Constipation      PERTINENT LABS:  06-10 Na141 mmol/L Glu 101 mg/dL<H> K+ 4.0 mmol/L Cr  0.83 mg/dL BUN 14 mg/dL 06-04 Alb 3.3 g/dL 05-26 Chol 166 mg/dL LDL --    HDL 50 mg/dL Trig 149 mg/dL      SKIN:  intact  EDEMA: none  LAST BM: 6/9    ESTIMATED NEEDS:   [X] no change since previous assessment  [ ] recalculated:     PREVIOUS NUTRITION DIAGNOSIS:    1. chewing/swallowing difficulty   2. nutrition related knowledge deficit    NUTRITION DIAGNOSIS is :  (X)  Ongoing         NEW NUTRITION DIAGNOSIS: N/A    NUTRITION RECOMMENDATIONS:   1. Continue current nutrition plan of care  2. Ongoing diet education   3. Diet consistency per speech therapy    MONITORING AND EVALUATION:   1. Tolerance to diet prescription   2. PO intake  3. Weights  4. Labs  5. Follow Up per protocol     RD to remain available   Lexi Pugh RDN   Pager #[phone]

## 2021-06-10 NOTE — PROGRESS NOTE ADULT - SUBJECTIVE AND OBJECTIVE BOX
DAILY PROGRESS NOTE:  HPI:  BRIANNE SCOTT is a 71M LH-dominant with PMHx of prediabetes, COPD ( 1/2ppd smoker), who presented to [facility] 05/25/2021 with left-sided weakness. He experienced a similar episode several years ago when he was evaluated for a stroke and was informed he had imbalance in his ears and given medication for 3 days with resolution of symptoms. CT head and CT angio head/neck on admission negative for acute pathology. MR brain with findings c/w acute right lacunar infarct. He was started on ASA/Plavix. He failed his initial dysphagia screen, but passed MBS on 5/27, cleared for soft with nectar-thick fluids.     Hospital course was otherwise unremarkable. Patient was evaluated by PM&R and therapy for functional deficits and gait/ ADL impairments and recommended acute rehabilitation. Patient was medically optimized for discharge to [facility] on 06/03/2021. (03 Jun 2021 13:29)      Recent Labs/Imaging:                        15.1   8.87  )-----------( 489      ( 10 Duran 2021 05:30 )             47.4     06-10    141  |  104  |  14  ----------------------------<  101<H>  4.0   |  31  |  0.83    Ca    8.9      10 Duran 2021 05:30      Medications:  acetaminophen   Tablet .. 650 milliGRAM(s) Oral every 6 hours PRN  ALBUTerol    90 MICROgram(s) HFA Inhaler 2 Puff(s) Inhalation every 6 hours PRN  amLODIPine   Tablet 2.5 milliGRAM(s) Oral daily  AQUAPHOR (petrolatum Ointment) 1 Application(s) Topical two times a day  aspirin enteric coated 81 milliGRAM(s) Oral daily  atorvastatin 80 milliGRAM(s) Oral at bedtime  budesonide  80 MICROgram(s)/formoterol 4.5 MICROgram(s) Inhaler 2 Puff(s) Inhalation two times a day  calamine/zinc oxide Lotion 1 Application(s) Topical two times a day  clopidogrel Tablet 75 milliGRAM(s) Oral daily  enoxaparin Injectable 40 milliGRAM(s) SubCutaneous daily  lidocaine   Patch 1 Patch Transdermal daily  melatonin 3 milliGRAM(s) Oral at bedtime PRN  nicotine -  14 mG/24Hr(s) Patch 1 Patch Transdermal daily  nystatin    Suspension 053290 Unit(s) Oral three times a day  polyethylene glycol 3350 17 Gram(s) Oral two times a day PRN  senna 2 Tablet(s) Oral at bedtime  tiotropium 18 MICROgram(s) Capsule 1 Capsule(s) Inhalation daily      Physical Exam:  Vital Signs Last 24 Hrs  T(C): 36.7 (10 Duran 2021 08:09), Max: 36.7 (09 Jun 2021 20:08)  T(F): 98 (10 Duran 2021 08:09), Max: 98 (09 Jun 2021 20:08)  HR: 85 (10 Duran 2021 08:30) (75 - 85)  BP: 118/69 (10 Duran 2021 08:09) (118/69 - 150/68)  BP(mean): --  RR: 16 (10 Duran 2021 08:09) (15 - 16)  SpO2: 97% (10 Duran 2021 08:30) (95% - 97%)      Review of Systems:   · Additional ROS	Patient was seen and examined during PT session this AM. No acute overnight events.     Still with intermittent left shoulder pain, but improved; has not been using Lidocaine patch. C/o diffuse pruritic rash across abdomen and to lower back; rash is interfering with sleep. Did not report any other complaints. Denied CP and abdominal pain; having regular BMs.     Physical Exam:   · Constitutional	detailed exam  · Constitutional Details	pleasant, O x 3 NAD  · Constitutional Comments	  · Respiratory	detailed exam  · Respiratory Details	breath sounds equal; clear bilaterally good effort  · Cardiovascular	detailed exam  · Cardiovascular Details	regular rate and rhythm  · Gastrointestinal	Has diffuse papular rash across abdomen and back; calamine cream applied throughout abdomen; skin appears dry  · GI Normal	soft; nontender  · Extremities	detailed exam  · Extremities Comments	no LE edema  calves soft and NT  improved AROM shoulder pain free range  	reduced FMC left UE   DAILY PROGRESS NOTE:  HPI:  BRIANNE SCOTT is a 71M LH-dominant with PMHx of prediabetes, COPD ( 1/2ppd smoker), who presented to [facility] 05/25/2021 with left-sided weakness. He experienced a similar episode several years ago when he was evaluated for a stroke and was informed he had imbalance in his ears and given medication for 3 days with resolution of symptoms. CT head and CT angio head/neck on admission negative for acute pathology. MR brain with findings c/w acute right lacunar infarct. He was started on ASA/Plavix. He failed his initial dysphagia screen, but passed MBS on 5/27, cleared for soft with nectar-thick fluids.     Hospital course was otherwise unremarkable. Patient was evaluated by PM&R and therapy for functional deficits and gait/ ADL impairments and recommended acute rehabilitation. Patient was medically optimized for discharge to [facility] on 06/03/2021. (03 Jun 2021 13:29)      Recent Labs/Imaging:                        15.1   8.87  )-----------( 489      ( 10 Duran 2021 05:30 )             47.4     06-10    141  |  104  |  14  ----------------------------<  101<H>  4.0   |  31  |  0.83    Ca    8.9      10 Duran 2021 05:30      Medications:  acetaminophen   Tablet .. 650 milliGRAM(s) Oral every 6 hours PRN  ALBUTerol    90 MICROgram(s) HFA Inhaler 2 Puff(s) Inhalation every 6 hours PRN  amLODIPine   Tablet 2.5 milliGRAM(s) Oral daily  AQUAPHOR (petrolatum Ointment) 1 Application(s) Topical two times a day  aspirin enteric coated 81 milliGRAM(s) Oral daily  atorvastatin 80 milliGRAM(s) Oral at bedtime  budesonide  80 MICROgram(s)/formoterol 4.5 MICROgram(s) Inhaler 2 Puff(s) Inhalation two times a day  calamine/zinc oxide Lotion 1 Application(s) Topical two times a day  clopidogrel Tablet 75 milliGRAM(s) Oral daily  enoxaparin Injectable 40 milliGRAM(s) SubCutaneous daily  lidocaine   Patch 1 Patch Transdermal daily  melatonin 3 milliGRAM(s) Oral at bedtime PRN  nicotine -  14 mG/24Hr(s) Patch 1 Patch Transdermal daily  nystatin    Suspension 717474 Unit(s) Oral three times a day  polyethylene glycol 3350 17 Gram(s) Oral two times a day PRN  senna 2 Tablet(s) Oral at bedtime  tiotropium 18 MICROgram(s) Capsule 1 Capsule(s) Inhalation daily      Physical Exam:  Vital Signs Last 24 Hrs  T(C): 36.7 (10 Duran 2021 08:09), Max: 36.7 (09 Jun 2021 20:08)  T(F): 98 (10 Duran 2021 08:09), Max: 98 (09 Jun 2021 20:08)  HR: 85 (10 Duran 2021 08:30) (75 - 85)  BP: 118/69 (10 Duran 2021 08:09) (118/69 - 150/68)  BP(mean): --  RR: 16 (10 Duran 2021 08:09) (15 - 16)  SpO2: 97% (10 Duran 2021 08:30) (95% - 97%)      Review of Systems:   · Additional ROS	Patient was seen and examined during PT session this AM. No acute overnight events.     Still with intermittent left shoulder pain, but improved; has not been using Lidocaine patch. C/o diffuse pruritic rash across abdomen and to lower back; rash is interfering with sleep. Did not report any other complaints. Denied CP and abdominal pain; having regular BMs.     no new medications, clothes were brought from home. States uses gold bond lotion at home for similar issues, has some mild itchiness top of thighs as well    Physical Exam:   · Constitutional	detailed exam  · Constitutional Details	pleasant, O x 3 NAD. Pleasant, motivated follows 1-2 step commands consistently  · Constitutional Comments	  · Respiratory	detailed exam  · Respiratory Details	breath sounds equal; clear bilaterally good effort  · Cardiovascular	detailed exam  · Cardiovascular Details	regular rate and rhythm  · Gastrointestinal	Has diffuse papular rash across abdomen and back; calamine cream applied throughout abdomen; skin appears dry, patchy in areas    papular , pruritic rash, faint top of right thigh  no blisters  · GI Normal	soft; nontender  · Extremities	detailed exam  · Extremities Comments	no LE edema  calves soft and NT  improved AROM shoulder pain free range  	reduced FMC left UE

## 2021-06-10 NOTE — PROGRESS NOTE ADULT - ASSESSMENT
BRIANNE SCOTT is a 71M with PMHx of prediabetes, COPD, and 1/2ppd smoker, who presented to [facility] 05/25/2021 with lacute right lacunar infarct, left hemiparesis and dysphagia, dysarthria, ataxia. Admitted for multidisciplinary rehab program.    #Acute right lacunar infarct  - ASA/Plavix for 90 days (through 8/25), then continue on ASA only  - atorvastatin 80mg qhs  - continue comprehensive rehab program PT/OT/ SLP 3 hours a day 5 days a week  - Precautions: respiratory, aspiration, fall    #COPD  - 1/2 ppd smoker; 25 pack years. Smoking cessation education reiterated with patient 6/9. Patient verbalized awareness, states he knows that he can control smoking and HTN to reduce risk, unlike age. Denies feelings cravings.   - Nicotine patch 14mg/24hr x6 weeks, then 7mg/24hr x2 weeks  - symbicort and spiriva  - proventil HFA PRN    # BP elevated on admission  then 160/90  - norvasc 2.5 daily (started 6/3)   - (129/81 - 133/85) controlled 6/9    #Prediabetes A1C 6.4  - consistent carb diet  - FS controlled, dc    #oral thrush:  - s/p nystatin swish and spit TID    # Left-sided shoulder pain: improved 6/10  - has chronic left-sided shoulder pain, but appears to have gotten worse since stroke. Likely exacerbation of existing pain 2/2 imbalance from compensation  - stretch during therapy, rotator cuff balance exercises  - cold compress after therapy PRN    #Abnormal CT Chest on 5/30  - patchy ground-glass and patchy opacities in left lower lobe  - COVID PCR: neg on 6/2  - Repeat follow up in 8 weeks    #Sleep:  - Melatonin PRN    #Pain:  - Tylenol PRN  - cold compress shoulder PRN    #GI/Bowel:  - Senna 2 tabs qhs and Miralax PRN    #Diet:  - carb consistent; soft with nectar-consistency liquids  - BUN/Cr 13/0.97 6/7, adequately hydrated  - MBS 6/10 - 2/7 attempts with trace aspiration, cleared by cough; continue with current diet today and plan to upgrade tomorrow to regular with thins if able to perform compensatory strategies with SLP    #Skin:  - Aquaphor to dry skin heels bilaterally  - will trial Aquaphor to abdomen, as it appears calamine cream is drying skin; hypoallergenic sheets    #DVT prophylaxis:  - Lovenox  - SCDs    #Case discussed in IDT rounds 06/07/2021  - Current status: supervision-setup with eating, Melissa UE dressing, modA LE dressing, CG transfers, CG 80' w/o AD, 12 steps Melissa, soft diet, mild higher level cognitive deficits, has good insight, MBS later this week  - Goals: Regan for ADLs, independent for functional transfers and ambulation  - TDD: 6/17 with outpatient PT/OT/SLP    #LABS  CBC BMP 6/4 BRIANNE SCOTT is a 71M with PMHx of prediabetes, COPD, and 1/2ppd smoker, who presented to [facility] 05/25/2021 with lacute right lacunar infarct, left hemiparesis and dysphagia, dysarthria, ataxia. Admitted for multidisciplinary rehab program.    #Acute right lacunar infarct  - ASA/Plavix for 90 days (through 8/25), then continue on ASA only  - atorvastatin 80mg qhs  - continue comprehensive rehab program PT/OT/ SLP 3 hours a day 5 days a week  - Precautions: respiratory, aspiration, fall    #COPD  - 1/2 ppd smoker; 25 pack years. Smoking cessation education reiterated with patient 6/9. Patient verbalized awareness, states he knows that he can control smoking and HTN to reduce risk, unlike age. Denies feelings cravings.   - Nicotine patch 14mg/24hr x6 weeks, then 7mg/24hr x2 weeks  - symbicort and spiriva  - proventil HFA PRN    # BP elevated on admission  then 160/90  - norvasc 2.5 daily (started 6/3)   - (118/69 - 150/68) 6/10    #Prediabetes A1C 6.4  - consistent carb diet  - FS controlled, dc    #oral thrush:  - s/p nystatin swish and spit TID    # Left-sided shoulder pain: improved 6/10  - has chronic left-sided shoulder pain, but appears to have gotten worse since stroke. Likely exacerbation of existing pain 2/2 imbalance from compensation  - stretch during therapy, rotator cuff balance exercises  - cold compress after therapy PRN    #Abnormal CT Chest on 5/30  - patchy ground-glass and patchy opacities in left lower lobe  - COVID PCR: neg on 6/2  - Repeat follow up in 8 weeks    #Sleep:  - Melatonin PRN    #Pain:  - Tylenol PRN  - cold compress shoulder PRN    #GI/Bowel:  - Senna 2 tabs qhs and Miralax PRN    #Diet:  - carb consistent; soft with nectar-consistency liquids  - BUN/Cr 13/0.97 6/7, adequately hydrated  - MBS 6/10 - 2/7 attempts with trace aspiration, cleared by cough; continue with current diet today and plan to upgrade tomorrow to regular with thins if able to perform compensatory strategies with SLP    #Skin:  - Aquaphor to dry skin heels bilaterally  - will trial Aquaphor to abdomen, as it appears calamine cream is drying skin; hypoallergenic sheets    #DVT prophylaxis:  - Lovenox  - SCDs    #Case discussed in IDT rounds 06/07/2021  - Current status: supervision-setup with eating, Melissa UE dressing, modA LE dressing, CG transfers, CG 80' w/o AD, 12 steps Melissa, soft diet, mild higher level cognitive deficits, has good insight, MBS later this week  - Goals: Regan for ADLs, independent for functional transfers and ambulation  - TDD: 6/17 with outpatient PT/OT/SLP    #LABS  CBC BMP 6/4 BRIANNE SCOTT is a 71M with PMHx of prediabetes, COPD, and 1/2ppd smoker, who presented to [facility] 05/25/2021 with lacute right lacunar infarct, left hemiparesis and dysphagia, dysarthria, ataxia. Admitted for multidisciplinary rehab program.    #Acute right lacunar infarct  - ASA/Plavix for 90 days (through 8/25), then continue on ASA only  - atorvastatin 80mg qhs  - continue comprehensive rehab program PT/OT/ SLP 3 hours a day 5 days a week  - Precautions: respiratory, aspiration, fall    #COPD  - 1/2 ppd smoker; 25 pack years. Smoking cessation education reiterated with patient 6/9. Patient verbalized awareness, states he knows that he can control smoking and HTN to reduce risk, unlike age  - Nicotine patch 14mg/24hr x6 weeks, then 7mg/24hr x2 weeks  - symbicort and spiriva  - proventil HFA PRN    # BP elevated on admission  then 160/90  - norvasc 2.5 daily (started 6/3)   - (118/69 - 150/68) 6/10    #Prediabetes A1C 6.4  - consistent carb diet  - FS controlled, dc    #oral thrush:  - s/p nystatin swish and spit TID    # Left-sided shoulder pain: improved 6/10  - has chronic left-sided shoulder pain, but appears to have gotten worse since stroke. Likely exacerbation of existing pain 2/2 imbalance from compensation  - stretch during therapy, rotator cuff balance exercises  - cold compress after therapy PRN    #Abnormal CT Chest on 5/30  - patchy ground-glass and patchy opacities in left lower lobe  - COVID PCR: neg on 6/2  - Repeat follow up in 8 weeks    #Sleep:  - Melatonin PRN    #Pain:  - Tylenol PRN  - cold compress shoulder PRN    #GI/Bowel:  - Senna 2 tabs qhs and Miralax PRN    #Diet:  - carb consistent; soft with nectar-consistency liquids  - BUN/Cr 13/0.97 6/7, adequately hydrated  - MBS 6/10 - 2/7 attempts with trace aspiration, cleared by cough; continue with current diet today and plan to upgrade tomorrow to regular with thins if able to perform compensatory strategies with SLP    #Skin:  - Aquaphor to dry skin heels bilaterally  - will trial Aquaphor to abdomen, as it appears calamine cream is drying skin; hypoallergenic sheets  - hydrocortisone cream bid x 5 days 6/10    #DVT prophylaxis:  - Lovenox  - SCDs    #Case discussed in IDT rounds 06/07/2021  - Current status: supervision-setup with eating, Melissa UE dressing, modA LE dressing, CG transfers, CG 80' w/o AD, 12 steps Melissa, soft diet, mild higher level cognitive deficits, has good insight, MBS later this week  - Goals: Regan for ADLs, independent for functional transfers and ambulation  - TDD: 6/17 with outpatient PT/OT/SLP    #LABS  CBC BMP 6/4

## 2021-06-11 PROCEDURE — 99232 SBSQ HOSP IP/OBS MODERATE 35: CPT

## 2021-06-11 RX ADMIN — Medication 1 PATCH: at 16:25

## 2021-06-11 RX ADMIN — Medication 1 PATCH: at 11:31

## 2021-06-11 RX ADMIN — Medication 500000 UNIT(S): at 21:15

## 2021-06-11 RX ADMIN — Medication 1 PATCH: at 11:58

## 2021-06-11 RX ADMIN — Medication 1 PATCH: at 11:30

## 2021-06-11 RX ADMIN — Medication 81 MILLIGRAM(S): at 11:58

## 2021-06-11 RX ADMIN — AMLODIPINE BESYLATE 2.5 MILLIGRAM(S): 2.5 TABLET ORAL at 05:14

## 2021-06-11 RX ADMIN — Medication 500000 UNIT(S): at 05:14

## 2021-06-11 RX ADMIN — CLOPIDOGREL BISULFATE 75 MILLIGRAM(S): 75 TABLET, FILM COATED ORAL at 11:58

## 2021-06-11 RX ADMIN — Medication 1 APPLICATION(S): at 16:26

## 2021-06-11 RX ADMIN — BUDESONIDE AND FORMOTEROL FUMARATE DIHYDRATE 2 PUFF(S): 160; 4.5 AEROSOL RESPIRATORY (INHALATION) at 20:37

## 2021-06-11 RX ADMIN — Medication 1 APPLICATION(S): at 05:14

## 2021-06-11 RX ADMIN — Medication 1 APPLICATION(S): at 16:27

## 2021-06-11 RX ADMIN — ENOXAPARIN SODIUM 40 MILLIGRAM(S): 100 INJECTION SUBCUTANEOUS at 11:58

## 2021-06-11 RX ADMIN — SENNA PLUS 2 TABLET(S): 8.6 TABLET ORAL at 21:15

## 2021-06-11 RX ADMIN — BUDESONIDE AND FORMOTEROL FUMARATE DIHYDRATE 2 PUFF(S): 160; 4.5 AEROSOL RESPIRATORY (INHALATION) at 08:13

## 2021-06-11 RX ADMIN — ATORVASTATIN CALCIUM 80 MILLIGRAM(S): 80 TABLET, FILM COATED ORAL at 21:15

## 2021-06-11 RX ADMIN — TIOTROPIUM BROMIDE 1 CAPSULE(S): 18 CAPSULE ORAL; RESPIRATORY (INHALATION) at 08:12

## 2021-06-11 NOTE — PROGRESS NOTE ADULT - SUBJECTIVE AND OBJECTIVE BOX
DAILY PROGRESS NOTE:  HPI:  BRIANNE SCOTT is a 71M LH-dominant with PMHx of prediabetes, COPD ( 1/2ppd smoker), who presented to [facility] 05/25/2021 with left-sided weakness. He experienced a similar episode several years ago when he was evaluated for a stroke and was informed he had imbalance in his ears and given medication for 3 days with resolution of symptoms. CT head and CT angio head/neck on admission negative for acute pathology. MR brain with findings c/w acute right lacunar infarct. He was started on ASA/Plavix. He failed his initial dysphagia screen, but passed MBS on 5/27, cleared for soft with nectar-thick fluids.     Hospital course was otherwise unremarkable. Patient was evaluated by PM&R and therapy for functional deficits and gait/ ADL impairments and recommended acute rehabilitation. Patient was medically optimized for discharge to [facility] on 06/03/2021. (03 Jun 2021 13:29)        Recent Labs/Imaging:                        15.1   8.87  )-----------( 489      ( 10 Duran 2021 05:30 )             47.4     06-10    141  |  104  |  14  ----------------------------<  101<H>  4.0   |  31  |  0.83    Ca    8.9      10 Duran 2021 05:30      Medications:  acetaminophen   Tablet .. 650 milliGRAM(s) Oral every 6 hours PRN  ALBUTerol    90 MICROgram(s) HFA Inhaler 2 Puff(s) Inhalation every 6 hours PRN  amLODIPine   Tablet 2.5 milliGRAM(s) Oral daily  AQUAPHOR (petrolatum Ointment) 1 Application(s) Topical two times a day  aspirin enteric coated 81 milliGRAM(s) Oral daily  atorvastatin 80 milliGRAM(s) Oral at bedtime  budesonide  80 MICROgram(s)/formoterol 4.5 MICROgram(s) Inhaler 2 Puff(s) Inhalation two times a day  clopidogrel Tablet 75 milliGRAM(s) Oral daily  enoxaparin Injectable 40 milliGRAM(s) SubCutaneous daily  hydrocortisone 2.5% Ointment 1 Application(s) Topical two times a day  melatonin 3 milliGRAM(s) Oral at bedtime PRN  nicotine -  14 mG/24Hr(s) Patch 1 Patch Transdermal daily  nystatin    Suspension 249736 Unit(s) Oral three times a day  polyethylene glycol 3350 17 Gram(s) Oral two times a day PRN  senna 2 Tablet(s) Oral at bedtime  tiotropium 18 MICROgram(s) Capsule 1 Capsule(s) Inhalation daily      Physical Exam:  Vital Signs Last 24 Hrs  T(C): 36.6 (11 Jun 2021 08:15), Max: 36.7 (10 Duran 2021 19:32)  T(F): 97.8 (11 Jun 2021 08:15), Max: 98 (10 Duran 2021 19:32)  HR: 75 (11 Jun 2021 08:15) (75 - 92)  BP: 123/72 (11 Jun 2021 08:15) (123/72 - 138/81)  BP(mean): --  RR: 15 (11 Jun 2021 08:15) (15 - 16)  SpO2: 95% (11 Jun 2021 08:15) (95% - 96%)    06-10-21 @ 07:01  -  06-11-21 @ 07:00  --------------------------------------------------------  IN: 620 mL / OUT: 1320 mL / NET: -700 mL      Review of Systems:   · Additional ROS	Patient was seen and examined during PT session this AM. No acute overnight events.     Still with intermittent left shoulder pain, but improved; has not been using Lidocaine patch. C/o diffuse pruritic rash across abdomen and to lower back; rash is interfering with sleep. Did not report any other complaints. Denied CP and abdominal pain; having regular BMs.     no new medications, clothes were brought from home. States uses gold bond lotion at home for similar issues, has some mild itchiness top of thighs as well    Physical Exam:   · Constitutional	detailed exam  · Constitutional Details	pleasant, O x 3 NAD. Pleasant, motivated follows 1-2 step commands consistently  · Constitutional Comments	  · Respiratory	detailed exam  · Respiratory Details	breath sounds equal; clear bilaterally good effort  · Cardiovascular	detailed exam  · Cardiovascular Details	regular rate and rhythm  · Gastrointestinal	Has diffuse papular rash across abdomen and back; calamine cream applied throughout abdomen; skin appears dry, patchy in areas    papular , pruritic rash, faint top of right thigh  no blisters  · GI Normal	soft; nontender  · Extremities	detailed exam  · Extremities Comments	no LE edema  calves soft and NT  improved AROM shoulder pain free range  	reduced FMC left UE   DAILY PROGRESS NOTE:  HPI:  BRIANNE SCOTT is a 71M LH-dominant with PMHx of prediabetes, COPD ( 1/2ppd smoker), who presented to [facility] 05/25/2021 with left-sided weakness. He experienced a similar episode several years ago when he was evaluated for a stroke and was informed he had imbalance in his ears and given medication for 3 days with resolution of symptoms. CT head and CT angio head/neck on admission negative for acute pathology. MR brain with findings c/w acute right lacunar infarct. He was started on ASA/Plavix. He failed his initial dysphagia screen, but passed MBS on 5/27, cleared for soft with nectar-thick fluids.     Hospital course was otherwise unremarkable. Patient was evaluated by PM&R and therapy for functional deficits and gait/ ADL impairments and recommended acute rehabilitation. Patient was medically optimized for discharge to [facility] on 06/03/2021. (03 Jun 2021 13:29)        Recent Labs/Imaging:                        15.1   8.87  )-----------( 489      ( 10 Duran 2021 05:30 )             47.4     06-10    141  |  104  |  14  ----------------------------<  101<H>  4.0   |  31  |  0.83    Ca    8.9      10 Duran 2021 05:30      Medications:  acetaminophen   Tablet .. 650 milliGRAM(s) Oral every 6 hours PRN  ALBUTerol    90 MICROgram(s) HFA Inhaler 2 Puff(s) Inhalation every 6 hours PRN  amLODIPine   Tablet 2.5 milliGRAM(s) Oral daily  AQUAPHOR (petrolatum Ointment) 1 Application(s) Topical two times a day  aspirin enteric coated 81 milliGRAM(s) Oral daily  atorvastatin 80 milliGRAM(s) Oral at bedtime  budesonide  80 MICROgram(s)/formoterol 4.5 MICROgram(s) Inhaler 2 Puff(s) Inhalation two times a day  clopidogrel Tablet 75 milliGRAM(s) Oral daily  enoxaparin Injectable 40 milliGRAM(s) SubCutaneous daily  hydrocortisone 2.5% Ointment 1 Application(s) Topical two times a day  melatonin 3 milliGRAM(s) Oral at bedtime PRN  nicotine -  14 mG/24Hr(s) Patch 1 Patch Transdermal daily  nystatin    Suspension 077349 Unit(s) Oral three times a day  polyethylene glycol 3350 17 Gram(s) Oral two times a day PRN  senna 2 Tablet(s) Oral at bedtime  tiotropium 18 MICROgram(s) Capsule 1 Capsule(s) Inhalation daily      Physical Exam:  Vital Signs Last 24 Hrs  T(C): 36.6 (11 Jun 2021 08:15), Max: 36.7 (10 Duran 2021 19:32)  T(F): 97.8 (11 Jun 2021 08:15), Max: 98 (10 Duran 2021 19:32)  HR: 75 (11 Jun 2021 08:15) (75 - 92)  BP: 123/72 (11 Jun 2021 08:15) (123/72 - 138/81)  BP(mean): --  RR: 15 (11 Jun 2021 08:15) (15 - 16)  SpO2: 95% (11 Jun 2021 08:15) (95% - 96%)    06-10-21 @ 07:01  -  06-11-21 @ 07:00  --------------------------------------------------------  IN: 620 mL / OUT: 1320 mL / NET: -700 mL      Review of Systems:   · Additional ROS	Patient was seen and examined at the bedside this AM. No acute overnight events. States abdominal rash is improved and is no longer experiencing pain in his left shoulder.     Denied CP, abdominal pain, constipation, diarrhea. Having regular BMs. States he is getting better every day; motivated to do therapy.     Physical Exam:   · Constitutional	detailed exam  · Constitutional Details	pleasant, O x 3 NAD. Pleasant, motivated follows 1-2 step commands consistently  · Constitutional Comments	  · Respiratory	detailed exam  · Respiratory Details	breath sounds equal; clear bilaterally good effort  · Cardiovascular	detailed exam  · Cardiovascular Details	regular rate and rhythm  · Gastrointestinal	Has diffuse papular rash across abdomen and back; calamine cream DC'ed    papular , pruritic rash, faint top of right thigh  no blisters  · GI Normal	soft; nontender  · Extremities	detailed exam  · Extremities Comments	no LE edema  calves soft and NT  improved AROM shoulder pain free range    FTN impaired on left UE, but improved from admission, now more accurately able to touch his own nose and examiner's index finger  	reduced FMC left UE   DAILY PROGRESS NOTE:  HPI:  BRIANNE SCOTT is a 71M LH-dominant with PMHx of prediabetes, COPD ( 1/2ppd smoker), who presented to [facility] 05/25/2021 with left-sided weakness. He experienced a similar episode several years ago when he was evaluated for a stroke and was informed he had imbalance in his ears and given medication for 3 days with resolution of symptoms. CT head and CT angio head/neck on admission negative for acute pathology. MR brain with findings c/w acute right lacunar infarct. He was started on ASA/Plavix. He failed his initial dysphagia screen, but passed MBS on 5/27, cleared for soft with nectar-thick fluids.     Hospital course was otherwise unremarkable. Patient was evaluated by PM&R and therapy for functional deficits and gait/ ADL impairments and recommended acute rehabilitation. Patient was medically optimized for discharge to [facility] on 06/03/2021. (03 Jun 2021 13:29)        Recent Labs/Imaging:                        15.1   8.87  )-----------( 489      ( 10 Duran 2021 05:30 )             47.4     06-10    141  |  104  |  14  ----------------------------<  101<H>  4.0   |  31  |  0.83    Ca    8.9      10 Duran 2021 05:30      Medications:  acetaminophen   Tablet .. 650 milliGRAM(s) Oral every 6 hours PRN  ALBUTerol    90 MICROgram(s) HFA Inhaler 2 Puff(s) Inhalation every 6 hours PRN  amLODIPine   Tablet 2.5 milliGRAM(s) Oral daily  AQUAPHOR (petrolatum Ointment) 1 Application(s) Topical two times a day  aspirin enteric coated 81 milliGRAM(s) Oral daily  atorvastatin 80 milliGRAM(s) Oral at bedtime  budesonide  80 MICROgram(s)/formoterol 4.5 MICROgram(s) Inhaler 2 Puff(s) Inhalation two times a day  clopidogrel Tablet 75 milliGRAM(s) Oral daily  enoxaparin Injectable 40 milliGRAM(s) SubCutaneous daily  hydrocortisone 2.5% Ointment 1 Application(s) Topical two times a day  melatonin 3 milliGRAM(s) Oral at bedtime PRN  nicotine -  14 mG/24Hr(s) Patch 1 Patch Transdermal daily  nystatin    Suspension 964122 Unit(s) Oral three times a day  polyethylene glycol 3350 17 Gram(s) Oral two times a day PRN  senna 2 Tablet(s) Oral at bedtime  tiotropium 18 MICROgram(s) Capsule 1 Capsule(s) Inhalation daily      Physical Exam:  Vital Signs Last 24 Hrs  T(C): 36.6 (11 Jun 2021 08:15), Max: 36.7 (10 Duran 2021 19:32)  T(F): 97.8 (11 Jun 2021 08:15), Max: 98 (10 Duran 2021 19:32)  HR: 75 (11 Jun 2021 08:15) (75 - 92)  BP: 123/72 (11 Jun 2021 08:15) (123/72 - 138/81)  BP(mean): --  RR: 15 (11 Jun 2021 08:15) (15 - 16)  SpO2: 95% (11 Jun 2021 08:15) (95% - 96%)    06-10-21 @ 07:01  -  06-11-21 @ 07:00  --------------------------------------------------------  IN: 620 mL / OUT: 1320 mL / NET: -700 mL      Review of Systems:   · Additional ROS	Patient was seen and examined at the bedside this AM. No acute overnight events. States abdominal rash is improved and is no longer experiencing pain in his left shoulder.     Denied CP, abdominal pain, constipation, diarrhea. Having regular BMs. States he is getting better every day; motivated to do therapy. notes improvement in strength, coordination. Pain in shoulder also nearly resolved with improved AROM    Physical Exam:   · Constitutional	detailed exam  · Constitutional Details	pleasant, O x 3 NAD. Pleasant, motivated follows 1-2 step commands consistently  good simple attention  · Constitutional Comments	  · Respiratory	detailed exam  · Respiratory Details	breath sounds equal; clear bilaterally good effort  · Cardiovascular	detailed exam  · Cardiovascular Details	regular rate and rhythm  · Gastrointestinal	Has diffuse papular rash across abdomen and back; calamine cream DC'ed. Rash is less confluent and pruritic 6/11    papular , pruritic rash, faint top of right thigh  no blisters  · GI Normal	soft; nontender  · Extremities	detailed exam  · Extremities Comments	no LE edema  calves soft and NT  improved AROM shoulder pain free range    FTN impaired on left UE, but improved from admission, now more accurately able to touch his own nose and examiner's index finger  	reduced FMC left UE

## 2021-06-11 NOTE — PROGRESS NOTE ADULT - SUBJECTIVE AND OBJECTIVE BOX
Patient is a 71y old  Male who presents with a chief complaint of right lacunar CVA (10 Duran 2021 10:49)      Patient seen and examined at bedside.  No overnight events  No complaints this morning. Sitting in chair    ALLERGIES:  eggs (Pruritus)  Milk (Pruritus)  No Known Drug Allergies    MEDICATIONS  (STANDING):  amLODIPine   Tablet 2.5 milliGRAM(s) Oral daily  AQUAPHOR (petrolatum Ointment) 1 Application(s) Topical two times a day  aspirin enteric coated 81 milliGRAM(s) Oral daily  atorvastatin 80 milliGRAM(s) Oral at bedtime  budesonide  80 MICROgram(s)/formoterol 4.5 MICROgram(s) Inhaler 2 Puff(s) Inhalation two times a day  clopidogrel Tablet 75 milliGRAM(s) Oral daily  enoxaparin Injectable 40 milliGRAM(s) SubCutaneous daily  hydrocortisone 2.5% Ointment 1 Application(s) Topical two times a day  nicotine -  14 mG/24Hr(s) Patch 1 Patch Transdermal daily  nystatin    Suspension 775367 Unit(s) Oral three times a day  senna 2 Tablet(s) Oral at bedtime  tiotropium 18 MICROgram(s) Capsule 1 Capsule(s) Inhalation daily    MEDICATIONS  (PRN):  acetaminophen   Tablet .. 650 milliGRAM(s) Oral every 6 hours PRN Temp greater or equal to 38C (100.4F), Mild Pain (1 - 3)  ALBUTerol    90 MICROgram(s) HFA Inhaler 2 Puff(s) Inhalation every 6 hours PRN Shortness of Breath and/or Wheezing  melatonin 3 milliGRAM(s) Oral at bedtime PRN Insomnia  polyethylene glycol 3350 17 Gram(s) Oral two times a day PRN Constipation    Vital Signs Last 24 Hrs  T(F): 97.8 (11 Jun 2021 08:15), Max: 98 (10 Duran 2021 19:32)  HR: 75 (11 Jun 2021 08:15) (75 - 92)  BP: 123/72 (11 Jun 2021 08:15) (123/72 - 138/81)  RR: 15 (11 Jun 2021 08:15) (15 - 16)  SpO2: 95% (11 Jun 2021 08:15) (95% - 97%)  I&O's Summary    10 Duran 2021 07:01  -  11 Jun 2021 07:00  --------------------------------------------------------  IN: 620 mL / OUT: 1320 mL / NET: -700 mL      PHYSICAL EXAM:  GENERAL: NAD  HENT:  Atraumatic, Normocephalic; No tonsillar erythema, exudates, or enlargement; Moist mucous membranes;   EYES: EOMI, PERRLA, conjunctiva and sclera clear, no lid-lag  NECK: Supple, No JVD, Normal thyroid  CHEST/LUNG: Clear to percussion bilaterally; No rales, rhonchi, wheezing, or rubs; normal respiratory effort, no intercostal retractions  HEART: Regular rate and rhythm; No murmurs, rubs, or gallops  ABDOMEN: Soft, Nontender, Nondistended; Bowel sounds present; No HSM  MUSCULOSKELETAL/EXTREMITIES:  2+ Peripheral Pulses, No clubbing, cyanosis, or peripheral edema; No digital cyanosis  PSYCH: Appropriate affect, Alert & Awake x 3    LABS:                        15.1   8.87  )-----------( 489      ( 10 Duran 2021 05:30 )             47.4       06-10    141  |  104  |  14  ----------------------------<  101  4.0   |  31  |  0.83    Ca    8.9      10 Duran 2021 05:30       eGFR if Non African American: 88 mL/min/1.73M2 (06-10-21 @ 05:30)  eGFR if African American: 102 mL/min/1.73M2 (06-10-21 @ 05:30)      05-26 Chol 166 mg/dL LDL -- HDL 50 mg/dL Trig 149 mg/dL      COVID-19 PCR: NotDetec (06-03-21 @ 15:48)  COVID-19 PCR: NotDetec (06-02-21 @ 18:35)  COVID-19 PCR: NotDetec (05-30-21 @ 10:13)  COVID-19 PCR: NotDetec (05-25-21 @ 19:18)      Care Discussed with Consultants/Other Providers: Yes

## 2021-06-11 NOTE — PROGRESS NOTE ADULT - ASSESSMENT
71-year-old man with history of prediabetes and COPD admitted to [facility] after hospital course for acute CVA.    Acute CVA (right lacunar infarct) resulting in Impaired gait, mobility, ADL  Dysphagia  - Comprehensive rehab program of PT/OT/SLP - 3 hours a day, 5 days a week  - Fall precautions  - Continue statin  - Continue Plavix 75mg daily for 90 days and ASA 81mg daily indefinitely  - Bowel regimen as per primary team  - Pain meds as per primary team     Prediabetes   - Stable  - A1C 6.3 (5/26/21)  - Diabetes Diet    COPD - stable  Current Smoker  - Smoking cessation given. Chantix at home  - Continue nicotine patch (14mg/24 hr patch for 6 weeks total then 7mg/24 hr patch for two more weeks afterwrards)  - Continue Spiriva, Symbicort, and PRN albuterol    Hypertension - stable  - Continue amlodipine  - Consider ACEi since Prediabetes if BP elevates  - Monitor BP    Elevated ALT  - monitor for now    Thrombocytosis  - monitor for now    DVT ppx - lovenox

## 2021-06-11 NOTE — PROGRESS NOTE ADULT - ASSESSMENT
BRIANNE SCOTT is a 71M with PMHx of prediabetes, COPD, and 1/2ppd smoker, who presented to [facility] 05/25/2021 with lacute right lacunar infarct, left hemiparesis and dysphagia, dysarthria, ataxia. Admitted for multidisciplinary rehab program.    #Acute right lacunar infarct  - ASA/Plavix for 90 days (through 8/25), then continue on ASA only  - atorvastatin 80mg qhs  - continue comprehensive rehab program PT/OT/ SLP 3 hours a day 5 days a week  - Precautions: respiratory, aspiration, fall    #COPD  - 1/2 ppd smoker; 25 pack years. Smoking cessation education reiterated with patient 6/9. Patient verbalized awareness, states he knows that he can control smoking and HTN to reduce risk, unlike age  - Nicotine patch 14mg/24hr x6 weeks, then 7mg/24hr x2 weeks  - symbicort and spiriva  - proventil HFA PRN    # BP elevated on admission  then 160/90  - norvasc 2.5 daily (started 6/3)   - (118/69 - 150/68) 6/10    #Prediabetes A1C 6.4  - consistent carb diet  - FS controlled, dc    #oral thrush:  - s/p nystatin swish and spit TID    # Left-sided shoulder pain: improved 6/10  - has chronic left-sided shoulder pain, but appears to have gotten worse since stroke. Likely exacerbation of existing pain 2/2 imbalance from compensation  - stretch during therapy, rotator cuff balance exercises  - cold compress after therapy PRN    #Abnormal CT Chest on 5/30  - patchy ground-glass and patchy opacities in left lower lobe  - COVID PCR: neg on 6/2  - Repeat follow up in 8 weeks    #Sleep:  - Melatonin PRN    #Pain:  - Tylenol PRN  - cold compress shoulder PRN    #GI/Bowel:  - Senna 2 tabs qhs and Miralax PRN    #Diet:  - carb consistent; soft with nectar-consistency liquids  - BUN/Cr 13/0.97 6/7, adequately hydrated  - MBS 6/10 - 2/7 attempts with trace aspiration, cleared by cough; continue with current diet today and plan to upgrade tomorrow to regular with thins if able to perform compensatory strategies with SLP    #Skin:  - Aquaphor to dry skin heels bilaterally  - will trial Aquaphor to abdomen, as it appears calamine cream is drying skin; hypoallergenic sheets  - hydrocortisone cream bid x 5 days 6/10    #DVT prophylaxis:  - Lovenox  - SCDs    #Case discussed in IDT rounds 06/07/2021  - Current status: supervision-setup with eating, Melissa UE dressing, modA LE dressing, CG transfers, CG 80' w/o AD, 12 steps Melissa, soft diet, mild higher level cognitive deficits, has good insight, MBS later this week  - Goals: Regan for ADLs, independent for functional transfers and ambulation  - TDD: 6/17 with outpatient PT/OT/SLP    #LABS  CBC BMP 6/4 BRIANNE SCOTT is a 71M with PMHx of prediabetes, COPD, and 1/2ppd smoker, who presented to [facility] 05/25/2021 with lacute right lacunar infarct, left hemiparesis and dysphagia, dysarthria, ataxia. Admitted for multidisciplinary rehab program.    #Acute right lacunar infarct  - ASA/Plavix for 90 days (through 8/25), then continue on ASA only  - atorvastatin 80mg qhs  - continue comprehensive rehab program PT/OT/ SLP 3 hours a day 5 days a week  - Precautions: respiratory, aspiration, fall    #COPD  - 1/2 ppd smoker; 25 pack years. Smoking cessation education reiterated with patient 6/9. Patient verbalized awareness, states he knows that he can control smoking and HTN to reduce risk, unlike age  - Nicotine patch 14mg/24hr x6 weeks, then 7mg/24hr x2 weeks  - symbicort and spiriva  - proventil HFA PRN    # BP elevated on admission  then 160/90  - norvasc 2.5 daily (started 6/3)   - (123/72 - 129/74) 6/11    #Prediabetes A1C 6.4  - consistent carb diet  - FS controlled, dc    #oral thrush:  - s/p nystatin swish and spit TID    # Left-sided shoulder pain: improved 6/11  - has chronic left-sided shoulder pain, but appears to have gotten worse since stroke. Likely exacerbation of existing pain 2/2 imbalance from compensation  - stretch during therapy, rotator cuff balance exercises  - cold compress after therapy PRN; denied pain this AM    #Abnormal CT Chest on 5/30  - patchy ground-glass and patchy opacities in left lower lobe  - COVID PCR: neg on 6/2  - Repeat follow up in 8 weeks    #Sleep:  - Melatonin PRN    #Pain:  - Tylenol PRN  - cold compress shoulder PRN    #GI/Bowel:  - Senna 2 tabs qhs and Miralax PRN    #Diet:  - carb consistent; soft with nectar-consistency liquids  - BUN/Cr 13/0.97 6/7, adequately hydrated  - MBS 6/10 - 2/7 attempts with trace aspiration, cleared by cough; continue with current diet 6/10and plan to upgrade 6/11 to regular with thins if able to perform compensatory strategies with SLP  - f/u SLP this afternoon    #Skin:  - Aquaphor to dry skin heels bilaterally  - will trial Aquaphor to abdomen, as it appears calamine cream is drying skin; hypoallergenic sheets  - hydrocortisone cream bid x 5 days 6/10    #DVT prophylaxis:  - Lovenox  - SCDs    #Case discussed in IDT rounds 06/07/2021  - Current status: supervision-setup with eating, Melissa UE dressing, modA LE dressing, CG transfers, CG 80' w/o AD, 12 steps Melissa, soft diet, mild higher level cognitive deficits, has good insight, MBS later this week  - Goals: Regan for ADLs, independent for functional transfers and ambulation  - TDD: 6/17 with outpatient PT/OT/SLP    #LABS  CBC BMP 6/14 BRIANNE SCOTT is a 71M with PMHx of prediabetes, COPD, and 1/2ppd smoker, who presented to [facility] 05/25/2021 with lacute right lacunar infarct, left hemiparesis and dysphagia, dysarthria, ataxia. Admitted for multidisciplinary rehab program.    #Acute right lacunar infarct  - ASA/Plavix for 90 days (through 8/25), then continue on ASA only  - atorvastatin 80mg qhs  - continue comprehensive rehab program PT/OT/ SLP 3 hours a day 5 days a week  - Precautions: respiratory, aspiration, fall    #COPD  - 1/2 ppd smoker; 25 pack years. Smoking cessation education reiterated with patient 6/9. Patient verbalized awareness, states he knows that he can control smoking and HTN to reduce risk, unlike age  - Nicotine patch 14mg/24hr x6 weeks, then 7mg/24hr x2 weeks  - symbicort and spiriva  - proventil HFA PRN    # BP elevated on admission  then 160/90  - norvasc 2.5 daily (started 6/3)   - (123/72 - 129/74) 6/11    #Prediabetes A1C 6.4  - consistent carb diet  - FS controlled, dc    #oral thrush:  - s/p nystatin swish and spit TID    # Left-sided shoulder pain: improved 6/11  - has chronic left-sided shoulder pain, but appears to have gotten worse since stroke. Likely exacerbation of existing pain 2/2 imbalance from compensation  - stretch during therapy, rotator cuff balance exercises  - cold compress after therapy PRN    #Abnormal CT Chest on 5/30  - patchy ground-glass and patchy opacities in left lower lobe  - COVID PCR: neg on 6/2  - Repeat follow up in 8 weeks    #Sleep:  - Melatonin PRN    #Pain:  - Tylenol PRN  - cold compress shoulder PRN    #GI/Bowel:  - Senna 2 tabs qhs and Miralax PRN    #Diet:  - carb consistent; soft with nectar-consistency liquids  - BUN/Cr 13/0.97 6/7, adequately hydrated  - MBS 6/10 - 2/7 attempts with trace aspiration, cleared by cough  - Cleared by SLP for regular solids and thin liquids 6/11, DASH/low sodium    #Skin:  - Aquaphor to dry skin heels bilaterally  - will trial Aquaphor to abdomen, as it appears calamine cream is drying skin; hypoallergenic sheets  - hydrocortisone cream bid x 5 days 6/10  - improved 6/11    #DVT prophylaxis:  - Lovenox  - SCDs    #Case discussed in IDT rounds 06/07/2021  - Current status: supervision-setup with eating, Melissa UE dressing, modA LE dressing, CG transfers, CG 80' w/o AD, 12 steps Melissa, soft diet, mild higher level cognitive deficits, has good insight, MBS later this week  - Goals: Regan for ADLs, independent for functional transfers and ambulation  - TDD: 6/17 with outpatient PT/OT/SLP    #LABS  CBC BMP 6/14

## 2021-06-12 PROCEDURE — 99232 SBSQ HOSP IP/OBS MODERATE 35: CPT

## 2021-06-12 RX ORDER — LANOLIN ALCOHOL/MO/W.PET/CERES
3 CREAM (GRAM) TOPICAL AT BEDTIME
Refills: 0 | Status: DISCONTINUED | OUTPATIENT
Start: 2021-06-12 | End: 2021-06-17

## 2021-06-12 RX ADMIN — Medication 500000 UNIT(S): at 05:34

## 2021-06-12 RX ADMIN — TIOTROPIUM BROMIDE 1 CAPSULE(S): 18 CAPSULE ORAL; RESPIRATORY (INHALATION) at 08:25

## 2021-06-12 RX ADMIN — Medication 1 PATCH: at 11:42

## 2021-06-12 RX ADMIN — Medication 3 MILLIGRAM(S): at 21:22

## 2021-06-12 RX ADMIN — Medication 1 PATCH: at 17:03

## 2021-06-12 RX ADMIN — Medication 500000 UNIT(S): at 21:22

## 2021-06-12 RX ADMIN — BUDESONIDE AND FORMOTEROL FUMARATE DIHYDRATE 2 PUFF(S): 160; 4.5 AEROSOL RESPIRATORY (INHALATION) at 21:15

## 2021-06-12 RX ADMIN — Medication 1 APPLICATION(S): at 05:34

## 2021-06-12 RX ADMIN — Medication 1 PATCH: at 11:43

## 2021-06-12 RX ADMIN — Medication 1 PATCH: at 07:38

## 2021-06-12 RX ADMIN — Medication 1 APPLICATION(S): at 16:23

## 2021-06-12 RX ADMIN — Medication 81 MILLIGRAM(S): at 11:43

## 2021-06-12 RX ADMIN — AMLODIPINE BESYLATE 2.5 MILLIGRAM(S): 2.5 TABLET ORAL at 05:34

## 2021-06-12 RX ADMIN — BUDESONIDE AND FORMOTEROL FUMARATE DIHYDRATE 2 PUFF(S): 160; 4.5 AEROSOL RESPIRATORY (INHALATION) at 08:25

## 2021-06-12 RX ADMIN — ENOXAPARIN SODIUM 40 MILLIGRAM(S): 100 INJECTION SUBCUTANEOUS at 11:43

## 2021-06-12 RX ADMIN — CLOPIDOGREL BISULFATE 75 MILLIGRAM(S): 75 TABLET, FILM COATED ORAL at 11:43

## 2021-06-12 RX ADMIN — SENNA PLUS 2 TABLET(S): 8.6 TABLET ORAL at 21:22

## 2021-06-12 RX ADMIN — Medication 500000 UNIT(S): at 14:06

## 2021-06-12 RX ADMIN — ATORVASTATIN CALCIUM 80 MILLIGRAM(S): 80 TABLET, FILM COATED ORAL at 21:22

## 2021-06-12 NOTE — PROGRESS NOTE ADULT - SUBJECTIVE AND OBJECTIVE BOX
No overnight events.  notes trouble sleeping     REVIEW OF SYSTEMS  Constitutional - No fever,  No fatigue  Neurological - No headaches, No loss of strength  Musculoskeletal - No joint pain, No joint swelling, No muscle pain    VITALS  T(C): 36.4 (06-12-21 @ 07:38), Max: 36.8 (06-11-21 @ 19:54)  HR: 79 (06-12-21 @ 08:30) (71 - 85)  BP: 129/76 (06-12-21 @ 07:38) (113/68 - 129/76)  RR: 15 (06-12-21 @ 07:38) (15 - 16)  SpO2: 98% (06-12-21 @ 08:30) (96% - 98%)  Wt(kg): --       MEDICATIONS   acetaminophen   Tablet .. 650 milliGRAM(s) every 6 hours PRN  ALBUTerol    90 MICROgram(s) HFA Inhaler 2 Puff(s) every 6 hours PRN  amLODIPine   Tablet 2.5 milliGRAM(s) daily  AQUAPHOR (petrolatum Ointment) 1 Application(s) two times a day  aspirin enteric coated 81 milliGRAM(s) daily  atorvastatin 80 milliGRAM(s) at bedtime  budesonide  80 MICROgram(s)/formoterol 4.5 MICROgram(s) Inhaler 2 Puff(s) two times a day  clopidogrel Tablet 75 milliGRAM(s) daily  enoxaparin Injectable 40 milliGRAM(s) daily  hydrocortisone 2.5% Ointment 1 Application(s) two times a day  melatonin 3 milliGRAM(s) at bedtime PRN  nicotine -  14 mG/24Hr(s) Patch 1 Patch daily  nystatin    Suspension 349231 Unit(s) three times a day  polyethylene glycol 3350 17 Gram(s) two times a day PRN  senna 2 Tablet(s) at bedtime  tiotropium 18 MICROgram(s) Capsule 1 Capsule(s) daily      RECENT LABS/IMAGING                        ---------  PHYSICAL EXAM  Constitutional - NAD, Comfortable, in chair   Pulm - Breathing comfortably  Abd - Soft, NTND  Extremities - No edema, No calf tenderness  Neurologic Exam -                    Cognitive - Awake, Alert, oriented x 3     Psychiatric - Mood WNL, Affect WNL    ASSESSMENT/PLAN  71y Male PMHx of prediabetes, COPD, and 1/2ppd smoker with functional deficits after acute right lacunar infarct  on ASA/Plavix, statin   melatonin for sleep  bowel regimen   Continue current medical management  Pain - Tylenol PRN  DVT PPX - lovenox   Continue 3hrs a day of comprehensive rehab program.

## 2021-06-12 NOTE — PROGRESS NOTE ADULT - SUBJECTIVE AND OBJECTIVE BOX
Patient is a 71y old  Male who presents with a chief complaint of right lacunar CVA (12 Jun 2021 10:26)    Patient seen and examined at bedside. doing well. no acute medical complaints. requesting melatonin trial.    ALLERGIES:  eggs (Pruritus)  Milk (Pruritus)  No Known Drug Allergies    MEDICATIONS  (STANDING):  amLODIPine   Tablet 2.5 milliGRAM(s) Oral daily  AQUAPHOR (petrolatum Ointment) 1 Application(s) Topical two times a day  aspirin enteric coated 81 milliGRAM(s) Oral daily  atorvastatin 80 milliGRAM(s) Oral at bedtime  budesonide  80 MICROgram(s)/formoterol 4.5 MICROgram(s) Inhaler 2 Puff(s) Inhalation two times a day  clopidogrel Tablet 75 milliGRAM(s) Oral daily  enoxaparin Injectable 40 milliGRAM(s) SubCutaneous daily  hydrocortisone 2.5% Ointment 1 Application(s) Topical two times a day  melatonin 3 milliGRAM(s) Oral at bedtime  nicotine -  14 mG/24Hr(s) Patch 1 Patch Transdermal daily  nystatin    Suspension 033228 Unit(s) Oral three times a day  senna 2 Tablet(s) Oral at bedtime  tiotropium 18 MICROgram(s) Capsule 1 Capsule(s) Inhalation daily    MEDICATIONS  (PRN):  acetaminophen   Tablet .. 650 milliGRAM(s) Oral every 6 hours PRN Temp greater or equal to 38C (100.4F), Mild Pain (1 - 3)  ALBUTerol    90 MICROgram(s) HFA Inhaler 2 Puff(s) Inhalation every 6 hours PRN Shortness of Breath and/or Wheezing  polyethylene glycol 3350 17 Gram(s) Oral two times a day PRN Constipation    Vital Signs Last 24 Hrs  T(F): 97.5 (12 Jun 2021 07:38), Max: 98.2 (11 Jun 2021 19:54)  HR: 79 (12 Jun 2021 08:30) (71 - 85)  BP: 129/76 (12 Jun 2021 07:38) (113/68 - 129/76)  RR: 15 (12 Jun 2021 07:38) (15 - 16)  SpO2: 98% (12 Jun 2021 08:30) (96% - 98%)  I&O's Summary    11 Jun 2021 07:01  -  12 Jun 2021 07:00  --------------------------------------------------------  IN: 0 mL / OUT: 200 mL / NET: -200 mL          PHYSICAL EXAM:  General: NAD, A/O x 3, pleasant  ENT: MMM, no scleral icterus  Neck: Supple, No JVD  Lungs: Respirations unlabored. CTA b/l  Cardio: RRR, S1/S2  Abdomen: Soft, Nontender, Nondistended; Bowel sounds present  Extremities: No calf tenderness, No pitting edema b/l    LABS:                        15.1   8.87  )-----------( 489      ( 10 Duran 2021 05:30 )             47.4       06-10    141  |  104  |  14  ----------------------------<  101  4.0   |  31  |  0.83    Ca    8.9      10 Duran 2021 05:30     eGFR if Non African American: 88 mL/min/1.73M2 (06-10-21 @ 05:30)  eGFR if African American: 102 mL/min/1.73M2 (06-10-21 @ 05:30)    05-26 Chol 166 mg/dL LDL -- HDL 50 mg/dL Trig 149 mg/dL    COVID-19 PCR: NotDetec (06-03-21 @ 15:48)  COVID-19 PCR: NotDetec (06-02-21 @ 18:35)  COVID-19 PCR: NotDetec (05-30-21 @ 10:13)  COVID-19 PCR: NotDetec (05-25-21 @ 19:18)    RADIOLOGY & ADDITIONAL TESTS: reviewed    Care Discussed with Consultants/Other Providers: yes

## 2021-06-12 NOTE — PROGRESS NOTE ADULT - ASSESSMENT
72 y/o M with history of prediabetes and COPD admitted to [facility] after hospital course for acute CVA.    Acute CVA (right lacunar infarct) resulting in Impaired gait, mobility, ADL  Dysphagia  -Continue comprehensive rehab program -PT/OT/SLP per rehab team  -Pain management, bowel regimen per rehab   -Continue high dose statin  -Continue DAPT x90 days with Plavix then ASA 81mg daily indefinitely    Prediabetes - Stable  - A1C 6.3 (5/26/21)  - Diabetic/DASH diet    COPD - stable  Current Smoker  - Smoking cessation given. Chantix at home  - Continue nicotine patch (14mg/24 hr patch for 6 weeks total then 7mg/24 hr patch for two more weeks afterwrards)  - Continue Spiriva, Symbicort, and PRN albuterol    Hypertension - stable  - Continue amlodipine  - Consider ACEi since Prediabetes if BP elevates  - Monitor BP    Elevated ALT  - monitor for now    Thrombocytosis  - monitor for now    DVT ppx - lovenox

## 2021-06-13 PROCEDURE — 99232 SBSQ HOSP IP/OBS MODERATE 35: CPT

## 2021-06-13 RX ADMIN — Medication 500000 UNIT(S): at 14:51

## 2021-06-13 RX ADMIN — BUDESONIDE AND FORMOTEROL FUMARATE DIHYDRATE 2 PUFF(S): 160; 4.5 AEROSOL RESPIRATORY (INHALATION) at 08:11

## 2021-06-13 RX ADMIN — Medication 1 APPLICATION(S): at 17:58

## 2021-06-13 RX ADMIN — CLOPIDOGREL BISULFATE 75 MILLIGRAM(S): 75 TABLET, FILM COATED ORAL at 11:15

## 2021-06-13 RX ADMIN — SENNA PLUS 2 TABLET(S): 8.6 TABLET ORAL at 21:04

## 2021-06-13 RX ADMIN — Medication 1 PATCH: at 07:43

## 2021-06-13 RX ADMIN — BUDESONIDE AND FORMOTEROL FUMARATE DIHYDRATE 2 PUFF(S): 160; 4.5 AEROSOL RESPIRATORY (INHALATION) at 21:08

## 2021-06-13 RX ADMIN — Medication 3 MILLIGRAM(S): at 21:05

## 2021-06-13 RX ADMIN — Medication 1 APPLICATION(S): at 05:07

## 2021-06-13 RX ADMIN — Medication 500000 UNIT(S): at 21:04

## 2021-06-13 RX ADMIN — AMLODIPINE BESYLATE 2.5 MILLIGRAM(S): 2.5 TABLET ORAL at 05:07

## 2021-06-13 RX ADMIN — Medication 500000 UNIT(S): at 05:07

## 2021-06-13 RX ADMIN — Medication 1 PATCH: at 11:27

## 2021-06-13 RX ADMIN — TIOTROPIUM BROMIDE 1 CAPSULE(S): 18 CAPSULE ORAL; RESPIRATORY (INHALATION) at 08:11

## 2021-06-13 RX ADMIN — Medication 1 PATCH: at 18:50

## 2021-06-13 RX ADMIN — ENOXAPARIN SODIUM 40 MILLIGRAM(S): 100 INJECTION SUBCUTANEOUS at 11:15

## 2021-06-13 RX ADMIN — ATORVASTATIN CALCIUM 80 MILLIGRAM(S): 80 TABLET, FILM COATED ORAL at 21:04

## 2021-06-13 RX ADMIN — Medication 1 PATCH: at 11:15

## 2021-06-13 RX ADMIN — Medication 81 MILLIGRAM(S): at 11:26

## 2021-06-13 NOTE — PROGRESS NOTE ADULT - SUBJECTIVE AND OBJECTIVE BOX
No overnight events.  slept better last night     REVIEW OF SYSTEMS  Constitutional - No fever,  No fatigue  Neurological - No headaches, No loss of strength  Musculoskeletal - No joint pain, No joint swelling, No muscle pain    VITALS  T(C): 36.7 (06-12-21 @ 19:23), Max: 36.7 (06-12-21 @ 19:23)  HR: 79 (06-13-21 @ 07:42) (79 - 90)  BP: 114/68 (06-13-21 @ 07:42) (114/68 - 132/74)  RR: 15 (06-13-21 @ 07:42) (15 - 16)  SpO2: 97% (06-13-21 @ 07:42) (97% - 97%)  Wt(kg): --       MEDICATIONS   acetaminophen   Tablet .. 650 milliGRAM(s) every 6 hours PRN  ALBUTerol    90 MICROgram(s) HFA Inhaler 2 Puff(s) every 6 hours PRN  amLODIPine   Tablet 2.5 milliGRAM(s) daily  AQUAPHOR (petrolatum Ointment) 1 Application(s) two times a day  aspirin enteric coated 81 milliGRAM(s) daily  atorvastatin 80 milliGRAM(s) at bedtime  budesonide  80 MICROgram(s)/formoterol 4.5 MICROgram(s) Inhaler 2 Puff(s) two times a day  clopidogrel Tablet 75 milliGRAM(s) daily  enoxaparin Injectable 40 milliGRAM(s) daily  hydrocortisone 2.5% Ointment 1 Application(s) two times a day  melatonin 3 milliGRAM(s) at bedtime  nicotine -  14 mG/24Hr(s) Patch 1 Patch daily  nystatin    Suspension 976123 Unit(s) three times a day  polyethylene glycol 3350 17 Gram(s) two times a day PRN  senna 2 Tablet(s) at bedtime  tiotropium 18 MICROgram(s) Capsule 1 Capsule(s) daily      RECENT LABS/IMAGING        ---------  PHYSICAL EXAM  Constitutional - NAD, Comfortable, in bed  Pulm - Breathing comfortably  Abd - Soft, NTND  Extremities - No edema, No calf tenderness  Neurologic Exam -                    Cognitive - Awake, Alert, oriented x 3     Psychiatric - Mood WNL, Affect WNL    ASSESSMENT/PLAN  71y Male PMHx of prediabetes, COPD, and 1/2ppd smoker with functional deficits after acute right lacunar infarct  on ASA/Plavix, statin   melatonin for sleep  bowel regimen   Continue current medical management  Pain - Tylenol PRN  DVT PPX - lovenox   Continue 3hrs a day of comprehensive rehab program.

## 2021-06-13 NOTE — PROGRESS NOTE ADULT - SUBJECTIVE AND OBJECTIVE BOX
Patient is a 71y old  Male who presents with a chief complaint of right lacunar CVA (13 Jun 2021 08:45)      Patient seen and examined at bedside.  Denies chest pain, dyspnea, abd pain.    ALLERGIES:  eggs (Pruritus)  Milk (Pruritus)  No Known Drug Allergies    MEDICATIONS  (STANDING):  amLODIPine   Tablet 2.5 milliGRAM(s) Oral daily  AQUAPHOR (petrolatum Ointment) 1 Application(s) Topical two times a day  aspirin enteric coated 81 milliGRAM(s) Oral daily  atorvastatin 80 milliGRAM(s) Oral at bedtime  budesonide  80 MICROgram(s)/formoterol 4.5 MICROgram(s) Inhaler 2 Puff(s) Inhalation two times a day  clopidogrel Tablet 75 milliGRAM(s) Oral daily  enoxaparin Injectable 40 milliGRAM(s) SubCutaneous daily  hydrocortisone 2.5% Ointment 1 Application(s) Topical two times a day  melatonin 3 milliGRAM(s) Oral at bedtime  nicotine -  14 mG/24Hr(s) Patch 1 Patch Transdermal daily  nystatin    Suspension 639613 Unit(s) Oral three times a day  senna 2 Tablet(s) Oral at bedtime  tiotropium 18 MICROgram(s) Capsule 1 Capsule(s) Inhalation daily    MEDICATIONS  (PRN):  acetaminophen   Tablet .. 650 milliGRAM(s) Oral every 6 hours PRN Temp greater or equal to 38C (100.4F), Mild Pain (1 - 3)  ALBUTerol    90 MICROgram(s) HFA Inhaler 2 Puff(s) Inhalation every 6 hours PRN Shortness of Breath and/or Wheezing  polyethylene glycol 3350 17 Gram(s) Oral two times a day PRN Constipation    Vital Signs Last 24 Hrs  T(F): 98.1 (12 Jun 2021 19:23), Max: 98.1 (12 Jun 2021 19:23)  HR: 74 (13 Jun 2021 08:35) (74 - 90)  BP: 114/68 (13 Jun 2021 07:42) (114/68 - 132/74)  RR: 15 (13 Jun 2021 07:42) (15 - 16)  SpO2: 100% (13 Jun 2021 08:35) (97% - 100%)  I&O's Summary    12 Jun 2021 07:01  -  13 Jun 2021 07:00  --------------------------------------------------------  IN: 0 mL / OUT: 350 mL / NET: -350 mL        PHYSICAL EXAM:  General: NAD, A/O x 3  ENT: MMM  Neck: Supple, No JVD  Lungs: Clear to auscultation bilaterally  Cardio: RRR, S1/S2, No murmurs  Abdomen: Soft, Nontender, Nondistended; Bowel sounds present  Extremities: No calf tenderness, No pitting edema    LABS:                        05-26 Chol 166 mg/dL LDL -- HDL 50 mg/dL Trig 149 mg/dL                      COVID-19 PCR: NotDetec (06-03-21 @ 15:48)  COVID-19 PCR: NotDetec (06-02-21 @ 18:35)  COVID-19 PCR: NotDetec (05-30-21 @ 10:13)  COVID-19 PCR: NotDetec (05-25-21 @ 19:18)      RADIOLOGY & ADDITIONAL TESTS:    Care Discussed with Consultants/Other Providers:

## 2021-06-13 NOTE — PROGRESS NOTE ADULT - ASSESSMENT
70 y/o M with history of prediabetes and COPD admitted to [facility] after hospital course for acute CVA.    Acute CVA (right lacunar infarct) resulting in Impaired gait, mobility, ADL  Dysphagia  -Continue comprehensive rehab program -PT/OT/SLP per rehab team  -Pain management, bowel regimen per rehab   -Continue high dose statin  -Continue DAPT x90 days with Plavix then ASA 81mg daily indefinitely    Prediabetes - Stable  - A1C 6.3 (5/26/21)  - Diabetic/DASH diet    COPD - stable  Current Smoker  - Smoking cessation given. Chantix at home  - Continue nicotine patch (14mg/24 hr patch for 6 weeks total then 7mg/24 hr patch for two more weeks afterwrards)  - Continue Spiriva, Symbicort, and PRN albuterol    Hypertension - stable  - Continue amlodipine  - Consider ACEi since Prediabetes if BP elevates  - Monitor BP    Elevated ALT  - monitor for now    Thrombocytosis  - monitor for now    DVT ppx - lovenox

## 2021-06-14 ENCOUNTER — TRANSCRIPTION ENCOUNTER (OUTPATIENT)
Age: 71
End: 2021-06-14

## 2021-06-14 LAB
ANION GAP SERPL CALC-SCNC: 8 MMOL/L — SIGNIFICANT CHANGE UP (ref 5–17)
BUN SERPL-MCNC: 8 MG/DL — SIGNIFICANT CHANGE UP (ref 7–23)
CALCIUM SERPL-MCNC: 8.7 MG/DL — SIGNIFICANT CHANGE UP (ref 8.4–10.5)
CHLORIDE SERPL-SCNC: 105 MMOL/L — SIGNIFICANT CHANGE UP (ref 96–108)
CO2 SERPL-SCNC: 28 MMOL/L — SIGNIFICANT CHANGE UP (ref 22–31)
CREAT SERPL-MCNC: 0.8 MG/DL — SIGNIFICANT CHANGE UP (ref 0.5–1.3)
GLUCOSE SERPL-MCNC: 106 MG/DL — HIGH (ref 70–99)
HCT VFR BLD CALC: 46.8 % — SIGNIFICANT CHANGE UP (ref 39–50)
HGB BLD-MCNC: 14.8 G/DL — SIGNIFICANT CHANGE UP (ref 13–17)
MCHC RBC-ENTMCNC: 27 PG — SIGNIFICANT CHANGE UP (ref 27–34)
MCHC RBC-ENTMCNC: 31.6 GM/DL — LOW (ref 32–36)
MCV RBC AUTO: 85.2 FL — SIGNIFICANT CHANGE UP (ref 80–100)
NRBC # BLD: 0 /100 WBCS — SIGNIFICANT CHANGE UP (ref 0–0)
PLATELET # BLD AUTO: 490 K/UL — HIGH (ref 150–400)
POTASSIUM SERPL-MCNC: 3.9 MMOL/L — SIGNIFICANT CHANGE UP (ref 3.5–5.3)
POTASSIUM SERPL-SCNC: 3.9 MMOL/L — SIGNIFICANT CHANGE UP (ref 3.5–5.3)
RBC # BLD: 5.49 M/UL — SIGNIFICANT CHANGE UP (ref 4.2–5.8)
RBC # FLD: 13 % — SIGNIFICANT CHANGE UP (ref 10.3–14.5)
SODIUM SERPL-SCNC: 141 MMOL/L — SIGNIFICANT CHANGE UP (ref 135–145)
WBC # BLD: 8.05 K/UL — SIGNIFICANT CHANGE UP (ref 3.8–10.5)
WBC # FLD AUTO: 8.05 K/UL — SIGNIFICANT CHANGE UP (ref 3.8–10.5)

## 2021-06-14 PROCEDURE — 99232 SBSQ HOSP IP/OBS MODERATE 35: CPT

## 2021-06-14 RX ADMIN — Medication 1 APPLICATION(S): at 17:08

## 2021-06-14 RX ADMIN — BUDESONIDE AND FORMOTEROL FUMARATE DIHYDRATE 2 PUFF(S): 160; 4.5 AEROSOL RESPIRATORY (INHALATION) at 08:13

## 2021-06-14 RX ADMIN — ATORVASTATIN CALCIUM 80 MILLIGRAM(S): 80 TABLET, FILM COATED ORAL at 22:17

## 2021-06-14 RX ADMIN — Medication 500000 UNIT(S): at 15:23

## 2021-06-14 RX ADMIN — Medication 1 PATCH: at 06:12

## 2021-06-14 RX ADMIN — Medication 1 APPLICATION(S): at 05:29

## 2021-06-14 RX ADMIN — Medication 500000 UNIT(S): at 22:17

## 2021-06-14 RX ADMIN — TIOTROPIUM BROMIDE 1 CAPSULE(S): 18 CAPSULE ORAL; RESPIRATORY (INHALATION) at 08:13

## 2021-06-14 RX ADMIN — Medication 500000 UNIT(S): at 05:29

## 2021-06-14 RX ADMIN — Medication 81 MILLIGRAM(S): at 11:55

## 2021-06-14 RX ADMIN — SENNA PLUS 2 TABLET(S): 8.6 TABLET ORAL at 22:17

## 2021-06-14 RX ADMIN — Medication 1 PATCH: at 11:56

## 2021-06-14 RX ADMIN — ENOXAPARIN SODIUM 40 MILLIGRAM(S): 100 INJECTION SUBCUTANEOUS at 11:56

## 2021-06-14 RX ADMIN — Medication 1 PATCH: at 11:40

## 2021-06-14 RX ADMIN — Medication 3 MILLIGRAM(S): at 22:18

## 2021-06-14 RX ADMIN — CLOPIDOGREL BISULFATE 75 MILLIGRAM(S): 75 TABLET, FILM COATED ORAL at 11:55

## 2021-06-14 RX ADMIN — AMLODIPINE BESYLATE 2.5 MILLIGRAM(S): 2.5 TABLET ORAL at 05:30

## 2021-06-14 RX ADMIN — Medication 1 PATCH: at 19:00

## 2021-06-14 NOTE — DISCHARGE NOTE PROVIDER - HOSPITAL COURSE
BRIANNE SCOTT is a 71M LH-dominant with PMHx of prediabetes, COPD ( 1/2ppd smoker), who presented to [facility] 05/25/2021 with left-sided weakness. He experienced a similar episode several years ago when he was evaluated for a stroke and was informed he had imbalance in his ears and given medication for 3 days with resolution of symptoms. CT head and CT angio head/neck on admission negative for acute pathology. MR brain with findings c/w acute right lacunar infarct. He was started on ASA/Plavix. He failed his initial dysphagia screen, but passed MBS on 5/27, cleared for soft with nectar-thick fluids.     Hospital course was otherwise unremarkable. Patient was evaluated by PM&R and therapy for functional deficits and gait/ ADL impairments and recommended acute rehabilitation. Patient was medically optimized for discharge to [facility] on 06/03/2021. Patient was stable upon rehab admission to  [facility]. Admitted with gait instabilty, ADL, and functional impairments.     Rehab Course significant for _________. All other medical co-morbidities were stable. Patient tolerated course of inpatient PT/OT/SLP rehab with significant functional improvements and met rehab goals prior to discharge. Patient was medically cleared on ___  for discharged to ___       Patient will follow up with the following:   PCP   PM&R  Neurology BRIANNE SCOTT is a 71M LH-dominant with PMHx of prediabetes, COPD ( 1/2ppd smoker), who presented to [facility] 05/25/2021 with left-sided weakness. He experienced a similar episode several years ago when he was evaluated for a stroke and was informed he had imbalance in his ears and given medication for 3 days with resolution of symptoms. CT head and CT angio head/neck on admission negative for acute pathology. MR brain with findings c/w acute right lacunar infarct. He was started on ASA/Plavix. He failed his initial dysphagia screen, but passed MBS on 5/27, cleared for soft with nectar-thick fluids.     Hospital course was otherwise unremarkable. Patient was evaluated by PM&R and therapy for functional deficits and gait/ ADL impairments and recommended acute rehabilitation. Patient was medically optimized for discharge to [facility] on 06/03/2021. Patient was stable upon rehab admission to  [facility]patient [facility]. Admitted with gait instabilty, ADL, and functional impairments.     All other medical co-morbidities were stable. Patient tolerated course of inpatient PT/OT/SLP rehab with significant functional improvements and met rehab goals prior to discharge. Patient was medically cleared on 06/17/2021 for discharged to home.    Current Level of Function:  - OT:  supervision-setup with eating, grooming, bathing and toileting;  Melissa shower, mod I dressing,   - PT: Supervision- transfers, ambulation 150 ft w/ RW, 12 steps  - Speech-- regular diet--compensatory strategies after swallow, independent comprehension and expression, has good insight; Supervision PS and Memory.  close to baseline.       Patient will follow up with the following:   PCP   PM&R  Neurology

## 2021-06-14 NOTE — PROGRESS NOTE ADULT - SUBJECTIVE AND OBJECTIVE BOX
DAILY PROGRESS NOTE:  HPI:  BRIANNE SCOTT is a 71M LH-dominant with PMHx of prediabetes, COPD ( 1/2ppd smoker), who presented to [facility] 2021 with left-sided weakness. He experienced a similar episode several years ago when he was evaluated for a stroke and was informed he had imbalance in his ears and given medication for 3 days with resolution of symptoms. CT head and CT angio head/neck on admission negative for acute pathology. MR brain with findings c/w acute right lacunar infarct. He was started on ASA/Plavix. He failed his initial dysphagia screen, but passed MBS on , cleared for soft with nectar-thick fluids.     Hospital course was otherwise unremarkable. Patient was evaluated by PM&R and therapy for functional deficits and gait/ ADL impairments and recommended acute rehabilitation. Patient was medically optimized for discharge to [facility] on 2021. (2021 13:29)      Subjective:  Patient was seen and examined at the bedside this AM. No acute overnight events.     ROS:  Denied fever, chills, nausea, vomiting, CP, palpitations, coughing, wheezing, SOB, or abdominal pain. Last BM was on       Physical Exam:  Vital Signs Last 24 Hrs  T(C): 36.5 (2021 19:28), Max: 36.5 (2021 19:28)  T(F): 97.7 (2021 19:28), Max: 97.7 (2021 19:28)  HR: 65 (2021 05:27) (65 - 77)  BP: 128/80 (2021 05:27) (122/71 - 128/80)  BP(mean): --  RR: 15 (2021 19:28) (15 - 15)  SpO2: 96% (2021 21:09) (96% - 100%)    21 @ 07:01  -  21 @ 07:00  --------------------------------------------------------  IN: 0 mL / OUT: 870 mL / NET: -870 mL      Constitutional - NAD, Comfortable  Chest - CTAB  Cardiovascular - RRR, S1S2, no m/r/g  Abdomen - BS+, Soft, NTND  Extremities - No C/C/E, No calf tenderness   Neurologic Exam -                    Cognitive - Awake, Alert, AAO to self, place, date, year, situation     Communication - Fluent, No dysarthria     Motor -                     LEFT    UE - ShAB 5/5, EF 5/5, EE 5/5, WE 5/5,  5/5                    RIGHT UE - ShAB 5/5, EF 5/5, EE 5/5, WE 5/5,  5/5                    LEFT    LE - HF 5/5, KE 5/5, DF 5/5, PF 5/5                    RIGHT LE - HF 5/5, KE 5/5, DF 5/5, PF 5/5        Sensory - Intact to LT     Reflexes - 2+ b/l patellar, symmetric  Psychiatric - Mood stable, Affect WNL      Recent Labs/Imagin.8   8.05  )-----------( 490      ( 2021 05:30 )             46.8     06-14    141  |  105  |  8   ----------------------------<  106<H>  3.9   |  28  |  0.80    Ca    8.7      2021 05:30      Medications:  acetaminophen   Tablet .. 650 milliGRAM(s) Oral every 6 hours PRN  ALBUTerol    90 MICROgram(s) HFA Inhaler 2 Puff(s) Inhalation every 6 hours PRN  amLODIPine   Tablet 2.5 milliGRAM(s) Oral daily  AQUAPHOR (petrolatum Ointment) 1 Application(s) Topical two times a day  aspirin enteric coated 81 milliGRAM(s) Oral daily  atorvastatin 80 milliGRAM(s) Oral at bedtime  budesonide  80 MICROgram(s)/formoterol 4.5 MICROgram(s) Inhaler 2 Puff(s) Inhalation two times a day  clopidogrel Tablet 75 milliGRAM(s) Oral daily  enoxaparin Injectable 40 milliGRAM(s) SubCutaneous daily  hydrocortisone 2.5% Ointment 1 Application(s) Topical two times a day  melatonin 3 milliGRAM(s) Oral at bedtime  nicotine -  14 mG/24Hr(s) Patch 1 Patch Transdermal daily  nystatin    Suspension 657388 Unit(s) Oral three times a day  polyethylene glycol 3350 17 Gram(s) Oral two times a day PRN  senna 2 Tablet(s) Oral at bedtime  tiotropium 18 MICROgram(s) Capsule 1 Capsule(s) Inhalation daily DAILY PROGRESS NOTE:  HPI:  BRIANNE SCOTT is a 71M LH-dominant with PMHx of prediabetes, COPD ( 1/2ppd smoker), who presented to [facility] 2021 with left-sided weakness. He experienced a similar episode several years ago when he was evaluated for a stroke and was informed he had imbalance in his ears and given medication for 3 days with resolution of symptoms. CT head and CT angio head/neck on admission negative for acute pathology. MR brain with findings c/w acute right lacunar infarct. He was started on ASA/Plavix. He failed his initial dysphagia screen, but passed MBS on , cleared for soft with nectar-thick fluids.     Hospital course was otherwise unremarkable. Patient was evaluated by PM&R and therapy for functional deficits and gait/ ADL impairments and recommended acute rehabilitation. Patient was medically optimized for discharge to [facility] on 2021. (2021 13:29)      Subjective:  Patient was seen and examined at the bedside this AM. No acute overnight events. States "everything is good". Endorses "a little bit" of left shoulder pain this morning with overhead movements, but improved from prior. Pruritus from abdominal rash has resolved. Reported no other complaints.     ROS:  Denied numbness, paresthesias, nausea, vomiting, CP, palpitations, SOB, or abdominal pain. Having regular BMs.       Physical Exam:  Vital Signs Last 24 Hrs  T(C): 36.5 (2021 19:28), Max: 36.5 (2021 19:28)  T(F): 97.7 (2021 19:28), Max: 97.7 (2021 19:28)  HR: 65 (2021 05:27) (65 - 77)  BP: 128/80 (2021 05:27) (122/71 - 128/80)  BP(mean): --  RR: 15 (2021 19:28) (15 - 15)  SpO2: 96% (2021 21:09) (96% - 100%)    21 @ 07:01  -  21 @ 07:00  --------------------------------------------------------  IN: 0 mL / OUT: 870 mL / NET: -870 mL      Constitutional - NAD, Comfortable  Chest - breathing comfortably on RA  Cardiovascular - warm and well perfused  Abdomen - BS+, Soft, NTND  Extremities - No LE edema  Neurologic Exam -                    Cognitive - Awake and alert     Communication - Fluent, No dysarthria     Motor -                     LEFT    UE - ShAB 4/5, EF 4/5, EE 4/5, WE 4/5,  4/5                    RIGHT UE - ShAB 5/5, EF 5/5, EE 5/5, WE 5/5,  5/5                    LEFT    LE - HF 5/5, KE 5/5, DF 5/5, PF 5/5                    RIGHT LE - HF 5/5, KE 5/5, DF 5/5, PF 5/5        Sensory - Intact to LT  Psychiatric - Mood stable, Affect WNL      Recent Labs/Imagin.8   8.05  )-----------( 490      ( 2021 05:30 )             46.8     06-14    141  |  105  |  8   ----------------------------<  106<H>  3.9   |  28  |  0.80    Ca    8.7      2021 05:30      Medications:  acetaminophen   Tablet .. 650 milliGRAM(s) Oral every 6 hours PRN  ALBUTerol    90 MICROgram(s) HFA Inhaler 2 Puff(s) Inhalation every 6 hours PRN  amLODIPine   Tablet 2.5 milliGRAM(s) Oral daily  AQUAPHOR (petrolatum Ointment) 1 Application(s) Topical two times a day  aspirin enteric coated 81 milliGRAM(s) Oral daily  atorvastatin 80 milliGRAM(s) Oral at bedtime  budesonide  80 MICROgram(s)/formoterol 4.5 MICROgram(s) Inhaler 2 Puff(s) Inhalation two times a day  clopidogrel Tablet 75 milliGRAM(s) Oral daily  enoxaparin Injectable 40 milliGRAM(s) SubCutaneous daily  hydrocortisone 2.5% Ointment 1 Application(s) Topical two times a day  melatonin 3 milliGRAM(s) Oral at bedtime  nicotine -  14 mG/24Hr(s) Patch 1 Patch Transdermal daily  nystatin    Suspension 465617 Unit(s) Oral three times a day  polyethylene glycol 3350 17 Gram(s) Oral two times a day PRN  senna 2 Tablet(s) Oral at bedtime  tiotropium 18 MICROgram(s) Capsule 1 Capsule(s) Inhalation daily DAILY PROGRESS NOTE:  HPI:  BRIANNE SCOTT is a 71M LH-dominant with PMHx of prediabetes, COPD ( 1/2ppd smoker), who presented to [facility] 2021 with left-sided weakness. He experienced a similar episode several years ago when he was evaluated for a stroke and was informed he had imbalance in his ears and given medication for 3 days with resolution of symptoms. CT head and CT angio head/neck on admission negative for acute pathology. MR brain with findings c/w acute right lacunar infarct. He was started on ASA/Plavix. He failed his initial dysphagia screen, but passed MBS on , cleared for soft with nectar-thick fluids.     Hospital course was otherwise unremarkable. Patient was evaluated by PM&R and therapy for functional deficits and gait/ ADL impairments and recommended acute rehabilitation. Patient was medically optimized for discharge to [facility] on 2021. (2021 13:29)      Subjective:  Patient was seen and examined at the bedside this AM. No acute overnight events. States "everything is good". Endorses "a little bit" of left shoulder pain this morning with overhead movements, but improved from prior. Pruritus from abdominal rash has resolved. Reported no other complaints.     ROS:  Denied numbness, paresthesias, nausea, vomiting, CP, palpitations, SOB, or abdominal pain. Having regular BMs.       Physical Exam:  Vital Signs Last 24 Hrs  T(C): 36.5 (2021 19:28), Max: 36.5 (2021 19:28)  T(F): 97.7 (2021 19:28), Max: 97.7 (2021 19:28)  HR: 65 (2021 05:27) (65 - 77)  BP: 128/80 (2021 05:27) (122/71 - 128/80)  BP(mean): --  RR: 15 (2021 19:28) (15 - 15)  SpO2: 96% (2021 21:09) (96% - 100%)    21 @ 07:01  -  21 @ 07:00  --------------------------------------------------------  IN: 0 mL / OUT: 870 mL / NET: -870 mL      Constitutional - NAD, Comfortable  Chest - breathing comfortably on RA  Cardiovascular - warm and well perfused  Abdomen - BS+, Soft, NTND  Extremities - No LE edema  Neurologic Exam -                    Cognitive - Awake and alert     Communication - Fluent, No dysarthria     Motor -                     LEFT    UE - ShAB 4/5, EF 4/5, EE 4/5, WE 4/5,  4/5                    RIGHT UE - ShAB 5/5, EF 5/5, EE 5/5, WE 5/5,  5/5                    LEFT    LE - HF 5/5, KE 5/5, DF 5/5, PF 5/5                    RIGHT LE - HF 5/5, KE 5/5, DF 5/5, PF 5/5      Coordination-- impaired on left      Sensory - Intact to LT  Psychiatric - Mood stable, Affect WNL  MSK:  No significant left shoulder tenderness,  Neg Neers,  Mildly + Balbuena test,  +Empty can test,  Weakness of Ext. Rotators.        Recent Labs/Imagin.8   8.05  )-----------( 490      ( 2021 05:30 )             46.8     06-14    141  |  105  |  8   ----------------------------<  106<H>  3.9   |  28  |  0.80    Ca    8.7      2021 05:30      Medications:  acetaminophen   Tablet .. 650 milliGRAM(s) Oral every 6 hours PRN  ALBUTerol    90 MICROgram(s) HFA Inhaler 2 Puff(s) Inhalation every 6 hours PRN  amLODIPine   Tablet 2.5 milliGRAM(s) Oral daily  AQUAPHOR (petrolatum Ointment) 1 Application(s) Topical two times a day  aspirin enteric coated 81 milliGRAM(s) Oral daily  atorvastatin 80 milliGRAM(s) Oral at bedtime  budesonide  80 MICROgram(s)/formoterol 4.5 MICROgram(s) Inhaler 2 Puff(s) Inhalation two times a day  clopidogrel Tablet 75 milliGRAM(s) Oral daily  enoxaparin Injectable 40 milliGRAM(s) SubCutaneous daily  hydrocortisone 2.5% Ointment 1 Application(s) Topical two times a day  melatonin 3 milliGRAM(s) Oral at bedtime  nicotine -  14 mG/24Hr(s) Patch 1 Patch Transdermal daily  nystatin    Suspension 202334 Unit(s) Oral three times a day  polyethylene glycol 3350 17 Gram(s) Oral two times a day PRN  senna 2 Tablet(s) Oral at bedtime  tiotropium 18 MICROgram(s) Capsule 1 Capsule(s) Inhalation daily

## 2021-06-14 NOTE — DISCHARGE NOTE PROVIDER - CARE PROVIDERS DIRECT ADDRESSES
,DirectAddress_Unknown,DirectAddress_Unknown,[email or web address]

## 2021-06-14 NOTE — DISCHARGE NOTE PROVIDER - PROVIDER TOKENS
PROVIDER:[TOKEN:[[id number]]],PROVIDER:[TOKEN:[[id number]]],PROVIDER:[TOKEN:[[id number]]] PROVIDER:[TOKEN:[[id number]]],PROVIDER:[TOKEN:[[id number]]],PROVIDER:[TOKEN:[[id number]],FOLLOWUP:[1 month]]

## 2021-06-14 NOTE — DISCHARGE NOTE PROVIDER - NSDCCPCAREPLAN_GEN_ALL_CORE_FT
PRINCIPAL DISCHARGE DIAGNOSIS  Diagnosis: Stroke  Assessment and Plan of Treatment: You were admitted to [facility] for rehab after having a stroke. You were started on a medication, called Plavix on May 28th. Please continue to take this medication, as prescribed for a total of 90s days or until 8/25. Continue to take aspirin and atorvastatin as prescribed. Please having close follow up with your PCP and neurologist. You may follow up with Dr. Godfrey in a month for rehab needs.       PRINCIPAL DISCHARGE DIAGNOSIS  Diagnosis: Stroke  Assessment and Plan of Treatment: You were admitted to [facility] for rehab after having a stroke. You were started on a medication, called Plavix on May 28th. Please continue to take this medication, as prescribed for a total of 90s days or until 8/25. Continue to take aspirin and atorvastatin as prescribed. Please having close follow up with your PCP and neurologist. You may follow up with Dr. Godfrey in a month for rehab needs.      SECONDARY DISCHARGE DIAGNOSES  Diagnosis: Encounter for smoking cessation counseling  Assessment and Plan of Treatment: To help you quit smoking, we will be prescribing you with Nicotine patches. Please continue to use the 14mg/24hr patches once a day until 7/6. Then on 7/7, please use the 7mg/24hr patches, instead for a total of 2 weeks. If you continue to have trouble quitting or have cravings, please reach out to your PCP for further management.

## 2021-06-14 NOTE — DISCHARGE NOTE PROVIDER - NSDCFUADDINST_GEN_ALL_CORE_FT
You should take the Plavix (Clopidogrel) 75mg daily for 90 days starting from Friday May 28th until August 25th, and then discontinue after those 90 days.  You should take Aspirin 81mg daily and Lipitor (Atorvastatin) 80mg at bedtime indefinitely unless and until instructed otherwise by your outpatient doctors. You should take the Plavix (Clopidogrel) 75mg daily for 90 days starting from Friday May 28th until August 25th, and then discontinue after those 90 days.  You should take Aspirin 81mg daily and Lipitor (Atorvastatin) 80mg at bedtime indefinitely unless and until instructed otherwise by your outpatient doctors.  Supervision with all activities as advised by rehabilitation team    Avoid alcohol and unprescribed medications as these can impair the brain's recovery process.

## 2021-06-14 NOTE — DISCHARGE NOTE PROVIDER - CARE PROVIDER_API CALL
LUZMARIA DIOR  [id number]  [address] [place] [address]  Phone: [phone]  Fax: ()-  Follow Up Time:     Deng Mora (DO)  Neurology; Vascular Neurology  [address] [facility], [address]  [place] [address]  Phone: [phone]  Fax: [phone]  Follow Up Time:     Alessia Orozco (DO)  Brain Injury Medicine; PhysicalRehab Medicine  [address] [place] [address]  Phone: [phone]  Fax: [phone]  Follow Up Time:    LUZMARIA DIOR  [id number]  [address] [place] [address]  Phone: ([phone]  Fax: ()-  Follow Up Time:     Deng Mora (DO)  Neurology; Vascular Neurology  [address] [facility], [address]  [place] [address]  Phone: [phone]  Fax: [phone]  Follow Up Time:     Alessia Orozco (DO)  Brain Injury Medicine; PhysicalRehab Medicine  [address] [place] [address]  Phone: [phone]  Fax: [phone]  Follow Up Time: 1 month

## 2021-06-14 NOTE — PROGRESS NOTE ADULT - ASSESSMENT
BRIANNE SCOTT is a 71M with PMHx of prediabetes, COPD, and 1/2ppd smoker, who presented to [facility] 05/25/2021 with lacute right lacunar infarct, left hemiparesis and dysphagia, dysarthria, ataxia. Admitted for multidisciplinary rehab program.    #Acute right lacunar infarct  - ASA/Plavix for 90 days (through 8/25), then continue on ASA only  - atorvastatin 80mg qhs  - continue comprehensive rehab program PT/OT/ SLP 3 hours a day 5 days a week  - Precautions: respiratory, aspiration, fall    #COPD  - 1/2 ppd smoker; 25 pack years. Smoking cessation education reiterated with patient 6/9. Patient verbalized awareness, states he knows that he can control smoking and HTN to reduce risk, unlike age  - Nicotine patch 14mg/24hr x6 weeks, then 7mg/24hr x2 weeks  - symbicort and spiriva  - proventil HFA PRN    # BP elevated on admission  then 160/90  - norvasc 2.5 daily (started 6/3)   - improved    #Prediabetes A1C 6.4  - consistent carb diet  - FS controlled, dc    #oral thrush:  - s/p nystatin swish and spit TID    # Left-sided shoulder pain: improved  - has chronic left-sided shoulder pain, but appears to have gotten worse since stroke. Likely exacerbation of existing pain 2/2 imbalance from compensation  - stretch during therapy, rotator cuff balance exercises  - cold compress after therapy PRN    #Abnormal CT Chest on 5/30  - patchy ground-glass and patchy opacities in left lower lobe  - COVID PCR: neg on 6/2  - Repeat follow up in 8 weeks    #Sleep:  - Melatonin PRN    #Pain:  - Tylenol PRN  - cold compress shoulder PRN    #GI/Bowel:  - Senna 2 tabs qhs and Miralax PRN    #Diet:  - carb consistent; soft with nectar-consistency liquids  - BUN/Cr 13/0.97 6/7, adequately hydrated  - MBS 6/10 - 2/7 attempts with trace aspiration, cleared by cough  - Cleared by SLP for regular solids and thin liquids 6/11, DASH/low sodium    #Skin:  - Aquaphor to dry skin heels bilaterally  - will trial Aquaphor to abdomen, as it appears calamine cream is drying skin; hypoallergenic sheets  - hydrocortisone cream bid x 5 days 6/10  - improved    #DVT prophylaxis:  - Lovenox  - SCDs    #Case discussed in IDT rounds 06/07/2021  - Current status: supervision-setup with eating, Melissa UE dressing, modA LE dressing, CG transfers, CG 80' w/o AD, 12 steps Melissa, soft diet, mild higher level cognitive deficits, has good insight  - Goals: Regan for ADLs, independent for functional transfers and ambulation  - TDD: 6/17 with outpatient PT/OT/SLP    #LABS  CBC BMP 6/17 BRIANNE SCOTT is a 71M with PMHx of prediabetes, COPD, and 1/2ppd smoker, who presented to [facility] 05/25/2021 with lacute right lacunar infarct, left hemiparesis and dysphagia, dysarthria, ataxia. Admitted for multidisciplinary rehab program.    #Acute right lacunar infarct  - ASA/Plavix for 90 days (through 8/25), then continue on ASA only  - atorvastatin 80mg qhs  - continue comprehensive rehab program PT/OT/ SLP 3 hours a day 5 days a week  - Precautions: respiratory, aspiration, fall    #COPD  - 1/2 ppd smoker; 25 pack years. Smoking cessation education reiterated with patient 6/9. Patient verbalized awareness, states he knows that he can control smoking and HTN to reduce risk, unlike age  - Nicotine patch 14mg/24hr x6 weeks, then 7mg/24hr x2 weeks  - symbicort and spiriva  - proventil HFA PRN    # BP elevated on admission  then 160/90  - norvasc 2.5 daily (started 6/3)     #Prediabetes A1C 6.4  - consistent carb diet  - FS controlled, dc    #oral thrush:  - s/p nystatin swish and spit TID    # Left-sided shoulder pain: improved  - has chronic left-sided shoulder pain, but appears to have gotten worse since stroke. Likely exacerbation of existing pain 2/2 imbalance from compensation  - stretch during therapy, rotator cuff balance exercises  - cold compress after therapy PRN    #Abnormal CT Chest on 5/30  - patchy ground-glass and patchy opacities in left lower lobe  - COVID PCR: neg on 6/2  - Repeat follow up in 8 weeks    #Sleep:  - Melatonin PRN    #Pain:  - Tylenol PRN  - cold compress shoulder PRN    #GI/Bowel:  - Senna 2 tabs qhs and Miralax PRN    #Diet:  - carb consistent; soft with nectar-consistency liquids  - BUN/Cr 13/0.97 6/7, adequately hydrated  - MBS 6/10 - 2/7 attempts with trace aspiration, cleared by cough  - Cleared by SLP for regular solids and thin liquids 6/11, DASH/low sodium    #Skin:  - Aquaphor to dry skin heels bilaterally  - will trial Aquaphor to abdomen, as it appears calamine cream is drying skin; hypoallergenic sheets  - hydrocortisone cream bid x 5 days 6/10  - improved    #DVT prophylaxis:  - Lovenox  - SCDs    #Case discussed in IDT rounds 06/07/2021  - Current status: supervision-setup with eating, Melissa UE dressing, modA LE dressing, CG transfers, CG 80' w/o AD, 12 steps Melissa, soft diet, mild higher level cognitive deficits, has good insight  - Goals: Regan for ADLs, independent for functional transfers and ambulation  - TDD: 6/17 with outpatient PT/OT/SLP    #LABS  CBC BMP 6/17 BRIANNE SCOTT is a 71M with PMHx of prediabetes, COPD, and 1/2ppd smoker, who presented to [facility] 05/25/2021 with lacute right lacunar infarct, left hemiparesis and dysphagia, dysarthria, ataxia. Admitted for multidisciplinary rehab program.    #Acute right lacunar infarct  - ASA/Plavix for 90 days (through 8/25), then continue on ASA only  - atorvastatin 80mg qhs  - continue comprehensive rehab program PT/OT/ SLP 3 hours a day 5 days a week  - Precautions: respiratory, aspiration, fall    #COPD  - 1/2 ppd smoker; 25 pack years. Smoking cessation education reiterated with patient 6/9. Patient verbalized awareness, states he knows that he can control smoking and HTN to reduce risk, unlike age  - Nicotine patch 14mg/24hr x6 weeks, then 7mg/24hr x2 weeks  - symbicort and spiriva  - proventil HFA PRN    # HTN  - norvasc 2.5 daily (started 6/3)  --BP controlled--goal -150     #Prediabetes A1C 6.4  - consistent carb diet  - FS controlled, --stopped monitoring    #oral thrush:  - s/p nystatin swish and spit TID    # Left-sided shoulder pain: improved  -+ Rotator cuff pathology  - has chronic left-sided shoulder pain, but appears to have gotten worse since stroke. Likely exacerbation of existing pain 2/2 imbalance from compensation  - stretch during therapy, rotator cuff balance exercises  - cold compress after therapy PRN    #Abnormal CT Chest on 5/30  - patchy ground-glass and patchy opacities in left lower lobe  - COVID PCR: neg on 6/2  - Repeat follow up in 8 weeks    #Sleep:  - Melatonin   --sleeping through the night as per sleep log    #Pain:  - Tylenol PRN  - cold compress shoulder PRN    #GI/Bowel:  - Senna 2 tabs qhs and Miralax PRN    #Diet:  - carb consistent; soft with nectar-consistency liquids  - BUN/Cr 13/0.97 6/7, adequately hydrated  - MBS 6/10 - 2/7 attempts with trace aspiration, cleared by cough  - Cleared by SLP for regular solids and thin liquids 6/11, DASH/low sodium    #Skin:  - Aquaphor to dry skin heels bilaterally  - will trial Aquaphor to abdomen, as it appears calamine cream is drying skin; hypoallergenic sheets  - hydrocortisone cream bid x 5 days 6/10  - improved    #DVT prophylaxis:  - Lovenox  - SCDs    #Case discussed in IDT rounds 06/07/2021  - Current status: supervision-setup with eating, Melissa UE dressing, modA LE dressing, CG transfers, CG 80' w/o AD, 12 steps Melissa, soft diet, mild higher level cognitive deficits, has good insight  - Goals: Regan for ADLs, independent for functional transfers and ambulation  - TDD: 6/17 with outpatient PT/OT/SLP    #LABS  CBC BMP 6/17

## 2021-06-14 NOTE — DISCHARGE NOTE PROVIDER - NSDCMRMEDTOKEN_GEN_ALL_CORE_FT
Albuterol (Eqv-ProAir HFA) 90 mcg/inh inhalation aerosol: 2 puff(s) inhaled every 6 hours, As Needed  aspirin 81 mg oral delayed release tablet: 1 tab(s) orally once a day  atorvastatin 80 mg oral tablet: 1 tab(s) orally once a day (at bedtime)  cetirizine 10 mg oral tablet: 1 tab(s) orally once a day  Chantix 1 mg oral tablet: 1 tab(s) orally once a day  clopidogrel 75 mg oral tablet: 1 tab(s) orally once a day  nicotine 14 mg/24 hr transdermal film, extended release:  transdermal   Stiolto Respimat 60 ACT 2.5 mcg-2.5 mcg/inh inhalation aerosol: 2 puff(s) inhaled every 24 hours  tiotropium 18 mcg inhalation capsule: 1 cap(s) inhaled once a day   Albuterol (Eqv-ProAir HFA) 90 mcg/inh inhalation aerosol: 2 puff(s) inhaled every 6 hours, As Needed  amLODIPine 2.5 mg oral tablet: 1 tab(s) orally once a day  aspirin 81 mg oral delayed release tablet: 1 tab(s) orally once a day  atorvastatin 80 mg oral tablet: 1 tab(s) orally once a day (at bedtime)  clopidogrel 75 mg oral tablet: 1 tab(s) orally once a day  nicotine 14 mg/24 hr transdermal film, extended release: 1 film(s) transdermal once a day   nicotine 7 mg/24 hr transdermal film, extended release: 1 patch transdermally once a day   tiotropium 18 mcg inhalation capsule: 1 cap(s) inhaled once a day   Albuterol (Eqv-ProAir HFA) 90 mcg/inh inhalation aerosol: 2 puff(s) inhaled every 6 hours, As Needed  amLODIPine 2.5 mg oral tablet: 1 tab(s) orally once a day  aspirin 81 mg oral delayed release tablet: 1 tab(s) orally once a day  atorvastatin 80 mg oral tablet: 1 tab(s) orally once a day (at bedtime)  clopidogrel 75 mg oral tablet: 1 tab(s) orally once a day  nicotine 14 mg/24 hr transdermal film, extended release: 1 film(s) transdermal once a day   nicotine 7 mg/24 hr transdermal film, extended release: 1 patch transdermally once a day   Occupational Therapy: Dx: right lacunar infarct  OT BIW x 8 weeks  Eval and treat, left UE strengthening, stretching and coordination, ADLs, IADLs. HEP.   Physical Therapy: DX: right lacunar infarct  PT BIW x 8 weeks,  Eval and treat, LE strengthing, Coordination, balance, community ambulation, stairs, HEP  Speech Therapy: Dx: right lacunar infarct  Speech Therapy BIW x 6 weeks  Eval. and treat, Cognitive linguistic skills, HEP  tiotropium 18 mcg inhalation capsule: 1 cap(s) inhaled once a day

## 2021-06-15 PROCEDURE — 99232 SBSQ HOSP IP/OBS MODERATE 35: CPT

## 2021-06-15 RX ADMIN — Medication 500000 UNIT(S): at 06:34

## 2021-06-15 RX ADMIN — Medication 500000 UNIT(S): at 13:44

## 2021-06-15 RX ADMIN — Medication 1 PATCH: at 19:00

## 2021-06-15 RX ADMIN — AMLODIPINE BESYLATE 2.5 MILLIGRAM(S): 2.5 TABLET ORAL at 06:34

## 2021-06-15 RX ADMIN — Medication 3 MILLIGRAM(S): at 21:59

## 2021-06-15 RX ADMIN — CLOPIDOGREL BISULFATE 75 MILLIGRAM(S): 75 TABLET, FILM COATED ORAL at 11:51

## 2021-06-15 RX ADMIN — Medication 1 PATCH: at 11:49

## 2021-06-15 RX ADMIN — Medication 1 PATCH: at 07:55

## 2021-06-15 RX ADMIN — Medication 500000 UNIT(S): at 21:59

## 2021-06-15 RX ADMIN — Medication 1 APPLICATION(S): at 22:00

## 2021-06-15 RX ADMIN — Medication 1 PATCH: at 11:51

## 2021-06-15 RX ADMIN — TIOTROPIUM BROMIDE 1 CAPSULE(S): 18 CAPSULE ORAL; RESPIRATORY (INHALATION) at 08:15

## 2021-06-15 RX ADMIN — Medication 1 APPLICATION(S): at 06:35

## 2021-06-15 RX ADMIN — BUDESONIDE AND FORMOTEROL FUMARATE DIHYDRATE 2 PUFF(S): 160; 4.5 AEROSOL RESPIRATORY (INHALATION) at 22:23

## 2021-06-15 RX ADMIN — ATORVASTATIN CALCIUM 80 MILLIGRAM(S): 80 TABLET, FILM COATED ORAL at 21:59

## 2021-06-15 RX ADMIN — Medication 81 MILLIGRAM(S): at 11:51

## 2021-06-15 RX ADMIN — ENOXAPARIN SODIUM 40 MILLIGRAM(S): 100 INJECTION SUBCUTANEOUS at 11:50

## 2021-06-15 RX ADMIN — BUDESONIDE AND FORMOTEROL FUMARATE DIHYDRATE 2 PUFF(S): 160; 4.5 AEROSOL RESPIRATORY (INHALATION) at 08:15

## 2021-06-15 NOTE — PROGRESS NOTE ADULT - SUBJECTIVE AND OBJECTIVE BOX
DAILY PROGRESS NOTE:  HPI:  BRIANNE SCOTT is a 71M LH-dominant with PMHx of prediabetes, COPD ( 1/2ppd smoker), who presented to [facility] 2021 with left-sided weakness. He experienced a similar episode several years ago when he was evaluated for a stroke and was informed he had imbalance in his ears and given medication for 3 days with resolution of symptoms. CT head and CT angio head/neck on admission negative for acute pathology. MR brain with findings c/w acute right lacunar infarct. He was started on ASA/Plavix. He failed his initial dysphagia screen, but passed MBS on , cleared for soft with nectar-thick fluids.     Hospital course was otherwise unremarkable. Patient was evaluated by PM&R and therapy for functional deficits and gait/ ADL impairments and recommended acute rehabilitation. Patient was medically optimized for discharge to [facility] on 2021. (2021 13:29)      Subjective:  Patient was seen and examined at the bedside this AM. No acute overnight events.     ROS:  Denied fever, chills, nausea, vomiting, CP, palpitations, coughing, wheezing, SOB, or abdominal pain. Last BM was on       Physical Exam:  Vital Signs Last 24 Hrs  T(C): 36.4 (15 Duran 2021 07:56), Max: 36.4 (2021 19:32)  T(F): 97.6 (15 Duran 2021 07:56), Max: 97.6 (15 Duran 2021 07:56)  HR: 68 (15 Duran 2021 07:56) (68 - 89)  BP: 133/83 (15 Duran 2021 07:56) (105/67 - 133/83)  BP(mean): --  RR: 14 (15 Druan 2021 07:56) (14 - 15)  SpO2: 97% (15 Duran 2021 07:56) (94% - 97%)    21 @ 07:01  -  06-15-21 @ 07:00  --------------------------------------------------------  IN: 600 mL / OUT: 1149 mL / NET: -549 mL      Constitutional - NAD, Comfortable  Chest - breathing comfortably on RA  Cardiovascular - warm and well perfused  Abdomen - BS+, Soft, NTND  Extremities - No LE edema  Neurologic Exam -                    Cognitive - Awake and alert     Communication - Fluent, No dysarthria     Motor -                     LEFT    UE - ShAB 4/5, EF 4/5, EE 4/5, WE 4/5,  4/5                    RIGHT UE - ShAB 5/5, EF 5/5, EE 5/5, WE 5/5,  5/5                    LEFT    LE - HF 5/5, KE 5/5, DF 5/5, PF 5/5                    RIGHT LE - HF 5/5, KE 5/5, DF 5/5, PF 5/5      Coordination-- impaired on left      Sensory - Intact to LT  Psychiatric - Mood stable, Affect WNL  MSK:  No significant left shoulder tenderness,  Neg Neers,  Mildly + Balbuena test,  +Empty can test,  Weakness of Ext. Rotators.        Recent Labs/Imagin.8   8.05  )-----------( 490      ( 2021 05:30 )             46.8     06-14    141  |  105  |  8   ----------------------------<  106<H>  3.9   |  28  |  0.80    Ca    8.7      2021 05:30        Medications:  acetaminophen   Tablet .. 650 milliGRAM(s) Oral every 6 hours PRN  ALBUTerol    90 MICROgram(s) HFA Inhaler 2 Puff(s) Inhalation every 6 hours PRN  amLODIPine   Tablet 2.5 milliGRAM(s) Oral daily  AQUAPHOR (petrolatum Ointment) 1 Application(s) Topical two times a day  aspirin enteric coated 81 milliGRAM(s) Oral daily  atorvastatin 80 milliGRAM(s) Oral at bedtime  budesonide  80 MICROgram(s)/formoterol 4.5 MICROgram(s) Inhaler 2 Puff(s) Inhalation two times a day  clopidogrel Tablet 75 milliGRAM(s) Oral daily  enoxaparin Injectable 40 milliGRAM(s) SubCutaneous daily  melatonin 3 milliGRAM(s) Oral at bedtime  nicotine -  14 mG/24Hr(s) Patch 1 Patch Transdermal daily  nystatin    Suspension 174618 Unit(s) Oral three times a day  polyethylene glycol 3350 17 Gram(s) Oral two times a day PRN  senna 2 Tablet(s) Oral at bedtime  tiotropium 18 MICROgram(s) Capsule 1 Capsule(s) Inhalation daily DAILY PROGRESS NOTE:  HPI:  BRIANNE SCOTT is a 71M LH-dominant with PMHx of prediabetes, COPD ( 1/2ppd smoker), who presented to [facility] 2021 with left-sided weakness. He experienced a similar episode several years ago when he was evaluated for a stroke and was informed he had imbalance in his ears and given medication for 3 days with resolution of symptoms. CT head and CT angio head/neck on admission negative for acute pathology. MR brain with findings c/w acute right lacunar infarct. He was started on ASA/Plavix. He failed his initial dysphagia screen, but passed MBS on , cleared for soft with nectar-thick fluids.     Hospital course was otherwise unremarkable. Patient was evaluated by PM&R and therapy for functional deficits and gait/ ADL impairments and recommended acute rehabilitation. Patient was medically optimized for discharge to [facility] on 2021. (2021 13:29)      Subjective:  Patient was seen and examined during PT. Making progress.  Has questions about discharge.   No acute overnight events.     ROS:  Denied fever, chills, nausea, vomiting, CP, palpitations, coughing, wheezing, SOB, or abdominal pain. Last BM was this AM.       Physical Exam:  Vital Signs Last 24 Hrs  T(C): 36.4 (15 Duran 2021 07:56), Max: 36.4 (2021 19:32)  T(F): 97.6 (15 Duran 2021 07:56), Max: 97.6 (15 Duran 2021 07:56)  HR: 68 (15 Duran 2021 07:56) (68 - 89)  BP: 133/83 (15 Duran 2021 07:56) (105/67 - 133/83)  BP(mean): --  RR: 14 (15 Duran 2021 07:56) (14 - 15)  SpO2: 97% (15 Duran 2021 07:56) (94% - 97%)    21 @ 07:01  -  06-15-21 @ 07:00  --------------------------------------------------------  IN: 600 mL / OUT: 1149 mL / NET: -549 mL      Constitutional - NAD, Comfortable  Chest - breathing comfortably on RA  Cardiovascular - warm and well perfused  Abdomen - BS+, Soft, NTND  Extremities - No LE edema  Neurologic Exam -                    Cognitive - Awake and alert     Communication - Fluent, No dysarthria     Motor -                     LEFT    UE - ShAB 4/5, EF 4/5, EE 4/5, WE 4/5,  4/5                    RIGHT UE - ShAB 5/5, EF 5/5, EE 5/5, WE 5/5,  5/5                    LEFT    LE - HF 5/5, KE 5/5, DF 5/5, PF 5/5                    RIGHT LE - HF 5/5, KE 5/5, DF 5/5, PF 5/5      Coordination-- impaired on left      Sensory - Intact to LT  Psychiatric - Mood stable, Affect WNL  MSK:  No significant left shoulder tenderness,  Neg Neers,  Mildly + Balbuena test,  +Empty can test,  Weakness of Ext. Rotators.        Recent Labs/Imagin.8   8.05  )-----------( 490      ( 2021 05:30 )             46.8     06-14    141  |  105  |  8   ----------------------------<  106<H>  3.9   |  28  |  0.80    Ca    8.7      2021 05:30        Medications:  acetaminophen   Tablet .. 650 milliGRAM(s) Oral every 6 hours PRN  ALBUTerol    90 MICROgram(s) HFA Inhaler 2 Puff(s) Inhalation every 6 hours PRN  amLODIPine   Tablet 2.5 milliGRAM(s) Oral daily  AQUAPHOR (petrolatum Ointment) 1 Application(s) Topical two times a day  aspirin enteric coated 81 milliGRAM(s) Oral daily  atorvastatin 80 milliGRAM(s) Oral at bedtime  budesonide  80 MICROgram(s)/formoterol 4.5 MICROgram(s) Inhaler 2 Puff(s) Inhalation two times a day  clopidogrel Tablet 75 milliGRAM(s) Oral daily  enoxaparin Injectable 40 milliGRAM(s) SubCutaneous daily  melatonin 3 milliGRAM(s) Oral at bedtime  nicotine -  14 mG/24Hr(s) Patch 1 Patch Transdermal daily  nystatin    Suspension 030712 Unit(s) Oral three times a day  polyethylene glycol 3350 17 Gram(s) Oral two times a day PRN  senna 2 Tablet(s) Oral at bedtime  tiotropium 18 MICROgram(s) Capsule 1 Capsule(s) Inhalation daily

## 2021-06-15 NOTE — PROGRESS NOTE ADULT - ASSESSMENT
BRIANNE SCOTT is a 71M with PMHx of prediabetes, COPD, and 1/2ppd smoker, who presented to [facility] 05/25/2021 with lacute right lacunar infarct, left hemiparesis and dysphagia, dysarthria, ataxia. Admitted for multidisciplinary rehab program.    #Acute right lacunar infarct  - ASA/Plavix for 90 days (through 8/25), then continue on ASA only  - atorvastatin 80mg qhs  - continue comprehensive rehab program PT/OT/ SLP 3 hours a day 5 days a week  - Precautions: respiratory, aspiration, fall    #COPD  - 1/2 ppd smoker; 25 pack years. Smoking cessation education reiterated with patient 6/9. Patient verbalized awareness, states he knows that he can control smoking and HTN to reduce risk, unlike age  - Nicotine patch 14mg/24hr x6 weeks, then 7mg/24hr x2 weeks  - symbicort and spiriva  - proventil HFA PRN    # HTN  - norvasc 2.5 daily (started 6/3)  --BP controlled--goal -150     #Prediabetes A1C 6.4  - consistent carb diet  - FS controlled, --stopped monitoring    #oral thrush:  - s/p nystatin swish and spit TID    # Left-sided shoulder pain: improved  -+ Rotator cuff pathology  - has chronic left-sided shoulder pain, but appears to have gotten worse since stroke. Likely exacerbation of existing pain 2/2 imbalance from compensation  - stretch during therapy, rotator cuff balance exercises  - cold compress after therapy PRN    #Abnormal CT Chest on 5/30  - patchy ground-glass and patchy opacities in left lower lobe  - COVID PCR: neg on 6/2  - Repeat follow up in 8 weeks    #Sleep:  - Melatonin   --sleeping through the night as per sleep log    #Pain:  - Tylenol PRN  - cold compress shoulder PRN    #GI/Bowel:  - Senna 2 tabs qhs and Miralax PRN    #Diet:  - carb consistent; soft with nectar-consistency liquids  - BUN/Cr 13/0.97 6/7, adequately hydrated  - MBS 6/10 - 2/7 attempts with trace aspiration, cleared by cough  - Cleared by SLP for regular solids and thin liquids 6/11, DASH/low sodium    #Skin:  - Aquaphor to dry skin heels bilaterally  - will trial Aquaphor to abdomen, as it appears calamine cream is drying skin; hypoallergenic sheets  - hydrocortisone cream bid x 5 days 6/10  - improved    #DVT prophylaxis:  - Lovenox  - SCDs    #Case discussed in IDT rounds 06/07/2021  - Current status: supervision-setup with eating, Melissa UE dressing, modA LE dressing, CG transfers, CG 80' w/o AD, 12 steps Melissa, soft diet, mild higher level cognitive deficits, has good insight  - Goals: Regan for ADLs, independent for functional transfers and ambulation  - TDD: 6/17 with outpatient PT/OT/SLP    #LABS  CBC BMP 6/17 BRIANNE SCOTT is a 71M with PMHx of prediabetes, COPD, and 1/2ppd smoker, who presented to [facility] 05/25/2021 with lacute right lacunar infarct, left hemiparesis and dysphagia, dysarthria, ataxia. Admitted for multidisciplinary rehab program.    #Acute right lacunar infarct  - ASA/Plavix for 90 days (through 8/25), then continue on ASA only  - atorvastatin 80mg qhs  - continue comprehensive rehab program PT/OT/ SLP 3 hours a day 5 days a week  - Precautions: respiratory, aspiration, fall    #COPD  - 1/2 ppd smoker; 25 pack years. Smoking cessation education reiterated with patient 6/9. Patient verbalized awareness, states he knows that he can control smoking and HTN to reduce risk, unlike age  - Nicotine patch 14mg/24hr x6 weeks, then 7mg/24hr x2 weeks  - symbicort and spiriva  - proventil HFA PRN    # HTN  - norvasc 2.5 daily (started 6/3)  --BP controlled--goal -150     #Prediabetes A1C 6.4  - consistent carb diet  - FS controlled, --stopped monitoring    #oral thrush:  - s/p nystatin swish and spit TID    # Left-sided shoulder pain: improved  -+ Rotator cuff pathology  - has chronic left-sided shoulder pain, but appears to have gotten worse since stroke. Likely exacerbation of existing pain 2/2 imbalance from compensation  - stretch during therapy, rotator cuff balance exercises  - cold compress after therapy PRN    #Abnormal CT Chest on 5/30  - patchy ground-glass and patchy opacities in left lower lobe  - COVID PCR: neg on 6/2  - Repeat follow up in 8 weeks    #Sleep:  - Melatonin   --sleeping through the night as per sleep log    #Pain:  - Tylenol PRN  - cold compress shoulder PRN    #GI/Bowel:  - Senna 2 tabs qhs and Miralax PRN    #Diet:  - carb consistent; soft with nectar-consistency liquids  - MBS 6/10 - 2/7 attempts with trace aspiration, cleared by cough  - Cleared by SLP for regular solids and thin liquids 6/11, DASH/low sodium-tolerating well    #Skin:  - Aquaphor to dry skin heels bilaterally  - Aquaphor to abdomen, as it appears calamine cream is drying skin; hypoallergenic sheets  - hydrocortisone cream bid x 5 days 6/10  - improved    #DVT prophylaxis:  - Lovenox  - SCDs    #Case discussed in IDT rounds 06/07/2021  - Current status: supervision-setup with eating, Melissa UE dressing, modA LE dressing, CG transfers, CG 80' w/o AD, 12 steps Melissa, soft diet, mild higher level cognitive deficits, has good insight  - Goals: Regan for ADLs, independent for functional transfers and ambulation  - TDD: 6/17 with outpatient PT/OT/SLP    #LABS  CBC BMP 6/17 BRIANNE SCOTT is a 71M with PMHx of prediabetes, COPD, and 1/2ppd smoker, who presented to [facility] 05/25/2021 with lacute right lacunar infarct, left hemiparesis and dysphagia, dysarthria, ataxia. Admitted for multidisciplinary rehab program.    #Acute right lacunar infarct  - ASA/Plavix for 90 days (through 8/25), then continue on ASA only  - atorvastatin 80mg qhs  - continue comprehensive rehab program PT/OT/ SLP 3 hours a day 5 days a week  - Precautions: respiratory, aspiration, fall    #COPD  - 1/2 ppd smoker; 25 pack years. Smoking cessation education reiterated with patient 6/9. Patient verbalized awareness, states he knows that he can control smoking and HTN to reduce risk, unlike age  - Nicotine patch 14mg/24hr x6 weeks, then 7mg/24hr x2 weeks  - symbicort and spiriva  - proventil HFA PRN    # HTN  - norvasc 2.5 daily (started 6/3)  --BP controlled--goal -150     #Prediabetes A1C 6.4  - consistent carb diet  - FS controlled, --stopped monitoring    #oral thrush:  - s/p nystatin swish and spit TID    # Left-sided shoulder pain: improved  -+ Rotator cuff pathology  - has chronic left-sided shoulder pain, but appears to have gotten worse since stroke. Likely exacerbation of existing pain 2/2 imbalance from compensation  - stretch during therapy, rotator cuff balance exercises  - cold compress after therapy PRN    #Abnormal CT Chest on 5/30  - patchy ground-glass and patchy opacities in left lower lobe  - COVID PCR: neg on 6/2  - Repeat follow up in 8 weeks    #Sleep:  - Melatonin   --sleeping through the night as per sleep log    #Pain:  - Tylenol PRN  - cold compress shoulder PRN    #GI/Bowel:  - Senna 2 tabs qhs and Miralax PRN    #Diet:  - carb consistent; soft with nectar-consistency liquids  - MBS 6/10 - 2/7 attempts with trace aspiration, cleared by cough  - Cleared by SLP for regular solids and thin liquids 6/11, DASH/low sodium-tolerating well    #Skin:  - Aquaphor to dry skin heels bilaterally  - Aquaphor to abdomen, as it appears calamine cream is drying skin; hypoallergenic sheets  - hydrocortisone cream bid x 5 days 6/10  - improved    #DVT prophylaxis:  - Lovenox  - SCDs    #Case discussed in IDT rounds 06/15/2021  - OT:  supervision-setup with eating, grooming, bathing and toileting;  Melissa shower, mod I dressing,   -PT: Supervision- transfers, ambulation 150 ft w/ RW, 12 steps  Speech-- regular diet--compensatory strategies after swallow, independent comprehension and expression, has good insight; Supervision PS and Memory.  close to baseline.    - Goals: Regan for ADLs, Supervision-- bathing and shower; independent for functional transfers and ambulation  - TDD: 6/17 with outpatient PT/OT/SLP    #LABS  CBC BMP 6/17

## 2021-06-15 NOTE — PROGRESS NOTE ADULT - ATTENDING COMMENTS
Patient seen at bedside this am with resident Dr. Keegan Carmona  71 year old male right handed, admitted to rehab after recent hospitalisation stroke symptoms and work up showing right posterior corona radiata infarct . Patient with h/o smoking, prediabetic.   Exam with left hemiparesis and left dysmetria.     Patient on ASA/Plavix for 3 months and then ASA. Continue lipitor . Monitor LFTs.  Risk factor management, On nicotine patch for smoking cessation       Begin full rehab program   On Lovenox for DVT prophylaxis.     Hospitalist consult for co-management of medical comorbidities
Progress note amended to include my discussions with patient, resident, hospitalist, RN, SW, PT and my findings. Evaluation left shoulder performed this morning, likely due to imbalance RTC muscles, inaddition to poor biomechanics due to left UE incoordination and weakness. OT will work on stretching and RTC stabilization. modalities ordered, consider pain medications if worsens. BP better controlled
Pt. seen with resident.  Agree with documentation above as per resident with amendments made as appropriate. Patient medically stable. Making progress towards rehab goals.     Right Lacunar infarct with Left sided weakness and coordination deficits.   Stable-- no new complaints,  discharge planning 6/17
Progress note amended to include my discussions with patient, PT and resident. Skin evaluated, areas with calamine appear overly dry. No blisters, small patches top of thigh as well, maculo-papular, no warmth, no weeping. Will order aquaphor and hydrocortisone cream BID x 5 days. Labs reviewed, stable, vitals including BP stable on current meds. Shoulder pain improved
Progress note amended to include my discussions with patient, resident, hospitalist, RN, SW, PT and my findings. Patient's left shoulder pain nearly resolved, with continued improvement in AROM in pain-free range. Also demonstrated improved coordination left hand. Cleared by SLP to start regular solids and thin liquids diet today
Pt. seen with resident.  Agree with documentation above as per resident with amendments made as appropriate. Patient medically stable. Making progress towards rehab goals.     Right Lacunar infarct with Left sided weakness and coordination deficits.  Left chronic shoulder pain-- exam findings significant for likely supraspinatus and infraspinatus RTC pathology.  Pain improving.  Cont. strengthening and current pain management
Progress note amended to include my discussions with patient, resident, hospitalist, RN, SW, PT and my findings. Vitals reviewed, improved BP on norvasc. Also maintaining adequate hydration despite nectar thick liquids. Remainder labs and vitals stable. Patient showing improvement in strength and coordination, motivated, dc date 6/17 with outpatient therapy referrals set as goals in IDT today

## 2021-06-16 PROCEDURE — 99233 SBSQ HOSP IP/OBS HIGH 50: CPT

## 2021-06-16 RX ORDER — ATORVASTATIN CALCIUM 80 MG/1
1 TABLET, FILM COATED ORAL
Qty: 30 | Refills: 0
Start: 2021-06-16 | End: 2021-07-15

## 2021-06-16 RX ORDER — AMLODIPINE BESYLATE 2.5 MG/1
1 TABLET ORAL
Qty: 30 | Refills: 0
Start: 2021-06-16 | End: 2021-07-15

## 2021-06-16 RX ORDER — HYDROCORTISONE 1 %
1 OINTMENT (GRAM) TOPICAL EVERY 8 HOURS
Refills: 0 | Status: DISCONTINUED | OUTPATIENT
Start: 2021-06-16 | End: 2021-06-17

## 2021-06-16 RX ORDER — CETIRIZINE HYDROCHLORIDE 10 MG/1
1 TABLET ORAL
Qty: 0 | Refills: 0 | DISCHARGE

## 2021-06-16 RX ORDER — TIOTROPIUM BROMIDE AND OLODATEROL 3.124; 2.736 UG/1; UG/1
2 SPRAY, METERED RESPIRATORY (INHALATION)
Qty: 0 | Refills: 0 | DISCHARGE

## 2021-06-16 RX ORDER — ASPIRIN/CALCIUM CARB/MAGNESIUM 324 MG
1 TABLET ORAL
Qty: 30 | Refills: 0
Start: 2021-06-16 | End: 2021-07-15

## 2021-06-16 RX ORDER — CLOPIDOGREL BISULFATE 75 MG/1
1 TABLET, FILM COATED ORAL
Qty: 30 | Refills: 0
Start: 2021-06-16 | End: 2021-07-15

## 2021-06-16 RX ADMIN — BUDESONIDE AND FORMOTEROL FUMARATE DIHYDRATE 2 PUFF(S): 160; 4.5 AEROSOL RESPIRATORY (INHALATION) at 08:07

## 2021-06-16 RX ADMIN — Medication 1 PATCH: at 07:30

## 2021-06-16 RX ADMIN — Medication 1 APPLICATION(S): at 05:47

## 2021-06-16 RX ADMIN — ATORVASTATIN CALCIUM 80 MILLIGRAM(S): 80 TABLET, FILM COATED ORAL at 21:38

## 2021-06-16 RX ADMIN — BUDESONIDE AND FORMOTEROL FUMARATE DIHYDRATE 2 PUFF(S): 160; 4.5 AEROSOL RESPIRATORY (INHALATION) at 21:04

## 2021-06-16 RX ADMIN — Medication 500000 UNIT(S): at 21:38

## 2021-06-16 RX ADMIN — Medication 1 APPLICATION(S): at 21:38

## 2021-06-16 RX ADMIN — Medication 1 PATCH: at 12:40

## 2021-06-16 RX ADMIN — Medication 500000 UNIT(S): at 05:47

## 2021-06-16 RX ADMIN — ENOXAPARIN SODIUM 40 MILLIGRAM(S): 100 INJECTION SUBCUTANEOUS at 12:39

## 2021-06-16 RX ADMIN — CLOPIDOGREL BISULFATE 75 MILLIGRAM(S): 75 TABLET, FILM COATED ORAL at 12:40

## 2021-06-16 RX ADMIN — TIOTROPIUM BROMIDE 1 CAPSULE(S): 18 CAPSULE ORAL; RESPIRATORY (INHALATION) at 08:07

## 2021-06-16 RX ADMIN — SENNA PLUS 2 TABLET(S): 8.6 TABLET ORAL at 21:38

## 2021-06-16 RX ADMIN — AMLODIPINE BESYLATE 2.5 MILLIGRAM(S): 2.5 TABLET ORAL at 05:47

## 2021-06-16 RX ADMIN — Medication 500000 UNIT(S): at 14:06

## 2021-06-16 RX ADMIN — Medication 3 MILLIGRAM(S): at 21:38

## 2021-06-16 RX ADMIN — Medication 81 MILLIGRAM(S): at 12:40

## 2021-06-16 RX ADMIN — Medication 1 PATCH: at 20:37

## 2021-06-16 RX ADMIN — Medication 1 PATCH: at 12:37

## 2021-06-16 NOTE — PROGRESS NOTE ADULT - TIME BILLING
Discharge planning home tomorrow.  Spoke with pt and pt's Son, John, during family training today and reviewed discharge medications and instructions and progress in therapy.  All questions answered. Yes (specify)

## 2021-06-16 NOTE — CHART NOTE - NSCHARTNOTEFT_GEN_A_CORE
Assessment:   Patient seen for: f/u    Source: [x] medical review, [x] patient, [ ] family member    Factors impacting intake: [x] none [ ] nausea  [ ] vomiting [ ] diarrhea [ ] constipation  [ ]chewing problems [ ] swallowing issues  [ ] other:     Intake:  50-75%    Diet Prescription: Diet, DASH/ TLC:   Sodium & Cholesterol Restricted (06-11-21 @ 12:12)    Current Weight: 6/15: 122.1 Lbs  6/13: 123.2 Lbs  6/09: 126.9 Lbs  6/05: 127.2 Lbs  6/04: 126.5 Lbs    % Weight Change: slow progressive wt loss noted. Pt lost 4.4 Lbs in 9 days (3.5%)    Pt reports good appetite. PO intake as per pt 50-75%, as per chart 100%. Pt stays he can never finish all the foods at each meal. He usually does not eat as much. Pt feed self, no chewing/swallowing difficulties with current diet except with meats and chicken. Offered cut up meats in small size and pt agreed. Allergic to eggs and milk. Pt denies N/V/C/D. Last BM: 06/14. Slow progressive wt loss noted. Pt lost 4.4 Lbs in 9 days (3.5%). Pt stays this wt loss may be likely to increased physical activity during therapy. Glucerna Supplements offered, pt agreed w/ recommendations.     Pertinent Medications: MEDICATIONS  (STANDING):  amLODIPine   Tablet 2.5 milliGRAM(s) Oral daily  AQUAPHOR (petrolatum Ointment) 1 Application(s) Topical two times a day  aspirin enteric coated 81 milliGRAM(s) Oral daily  atorvastatin 80 milliGRAM(s) Oral at bedtime  budesonide  80 MICROgram(s)/formoterol 4.5 MICROgram(s) Inhaler 2 Puff(s) Inhalation two times a day  clopidogrel Tablet 75 milliGRAM(s) Oral daily  enoxaparin Injectable 40 milliGRAM(s) SubCutaneous daily  melatonin 3 milliGRAM(s) Oral at bedtime  nicotine -  14 mG/24Hr(s) Patch 1 Patch Transdermal daily  nystatin    Suspension 289991 Unit(s) Oral three times a day  senna 2 Tablet(s) Oral at bedtime  tiotropium 18 MICROgram(s) Capsule 1 Capsule(s) Inhalation daily    MEDICATIONS  (PRN):  acetaminophen   Tablet .. 650 milliGRAM(s) Oral every 6 hours PRN Temp greater or equal to 38C (100.4F), Mild Pain (1 - 3)  ALBUTerol    90 MICROgram(s) HFA Inhaler 2 Puff(s) Inhalation every 6 hours PRN Shortness of Breath and/or Wheezing  hydrocortisone 1% Ointment 1 Application(s) Topical every 8 hours PRN Rash and/or Itching  polyethylene glycol 3350 17 Gram(s) Oral two times a day PRN Constipation    Pertinent Labs: 06-14 Na141 mmol/L Glu 106 mg/dL<H> K+ 3.9 mmol/L Cr  0.80 mg/dL BUN 8 mg/dL 05-26 Chol 166 mg/dL LDL --    HDL 50 mg/dL Trig 149 mg/dL    CAPILLARY BLOOD GLUCOSE    Skin: WDL    Edema: None    Estimated Needs:   [x] no change since previous assessment  [ ] recalculated:     Previous Nutrition Diagnosis:   [x] Chewing/swallowing difficulties  [x] Food & Nutrition Related Knowledge Deficit [ ] Malnutrition     Nutrition Diagnosis is [x] ongoing  [ ] resolved [ ] not applicable     New Nutrition Diagnosis: [ ] not applicable       Interventions:   Recommend  [x] Continue with current diet: DASH/TLC   [x] Nutrition Supplement: add Glucerna shake 2 x day (per can 200 melinda, 10 g protein)   [x] Nutrition Support: will provide cut up meats and chicken as per pt preference   [x] Other: Honor pt's food preferences as feasible with prescribed diet.   [x] Obtain and record PO intake and weights daily   [x] Continue w/ DM therapeutic diet education at f/u interview.     Monitoring and Evaluation:   Continue to monitor Nutritional intake, Tolerance to diet prescription, weights, labs, skin integrity.  other:  RD remains available upon request and will follow up per protocol. Assessment:   Patient seen for: f/u    Source: [x] medical review, [x] patient, [ ] family member    Factors impacting intake: [x] none [ ] nausea  [ ] vomiting [ ] diarrhea [ ] constipation  [ ]chewing problems [ ] swallowing issues  [ ] other:     Intake:  50-75%    Diet Prescription: Diet, DASH/ TLC:   Sodium & Cholesterol Restricted (06-11-21 @ 12:12)    Current Weight: 6/15: 122.1 Lbs  6/13: 123.2 Lbs  6/09: 126.9 Lbs  6/05: 127.2 Lbs  6/04: 126.5 Lbs    % Weight Change: slow progressive wt loss noted. Pt lost 4.4 Lbs in 9 days (3.5%)    Pt reports good appetite. PO intake as per pt 50-75%, as per chart 100%. Pt stays he can never finish all the foods at each meal. He usually does not eat as much. Pt feed self, no chewing/swallowing difficulties with current diet except with meats and chicken. Offered cut up meats in small size and pt agreed. Allergic to eggs and milk. Pt denies N/V/C/D. Last BM: 06/14. Slow progressive wt loss noted. Pt lost 4.4 Lbs in 9 days (3.5%). Pt stays this wt loss may be likely to increased physical activity during therapy. Glucerna Supplements offered, pt agreed w/ recommendations.     Pertinent Medications: MEDICATIONS  (STANDING):  amLODIPine   Tablet 2.5 milliGRAM(s) Oral daily  AQUAPHOR (petrolatum Ointment) 1 Application(s) Topical two times a day  aspirin enteric coated 81 milliGRAM(s) Oral daily  atorvastatin 80 milliGRAM(s) Oral at bedtime  budesonide  80 MICROgram(s)/formoterol 4.5 MICROgram(s) Inhaler 2 Puff(s) Inhalation two times a day  clopidogrel Tablet 75 milliGRAM(s) Oral daily  enoxaparin Injectable 40 milliGRAM(s) SubCutaneous daily  melatonin 3 milliGRAM(s) Oral at bedtime  nicotine -  14 mG/24Hr(s) Patch 1 Patch Transdermal daily  nystatin    Suspension 205839 Unit(s) Oral three times a day  senna 2 Tablet(s) Oral at bedtime  tiotropium 18 MICROgram(s) Capsule 1 Capsule(s) Inhalation daily    MEDICATIONS  (PRN):  acetaminophen   Tablet .. 650 milliGRAM(s) Oral every 6 hours PRN Temp greater or equal to 38C (100.4F), Mild Pain (1 - 3)  ALBUTerol    90 MICROgram(s) HFA Inhaler 2 Puff(s) Inhalation every 6 hours PRN Shortness of Breath and/or Wheezing  hydrocortisone 1% Ointment 1 Application(s) Topical every 8 hours PRN Rash and/or Itching  polyethylene glycol 3350 17 Gram(s) Oral two times a day PRN Constipation    Pertinent Labs: 06-14 Na141 mmol/L Glu 106 mg/dL<H> K+ 3.9 mmol/L Cr  0.80 mg/dL BUN 8 mg/dL 05-26 Chol 166 mg/dL LDL --    HDL 50 mg/dL Trig 149 mg/dL    CAPILLARY BLOOD GLUCOSE    Skin: WDL    Edema: None    Estimated Needs:   [x] no change since previous assessment  [ ] recalculated:     Previous Nutrition Diagnosis:   [x] Chewing/swallowing difficulties  [x] Food & Nutrition Related Knowledge Deficit [ ] Malnutrition     Nutrition Diagnosis is [x] ongoing  [ ] resolved [ ] not applicable     New Nutrition Diagnosis: [ ] not applicable       Interventions:   Recommend  [x] Change diet: DASH/TLC, Consistent Carbohydrates diet (no snacks)  [x] Nutrition Supplement: add Glucerna shake 2 x day (per can 200 melinda, 10 g protein)   [x] Nutrition Support: will provide cut up meats and chicken as per pt preference   [x] Other: Honor pt's food preferences as feasible with prescribed diet.   [x] Obtain and record PO intake and weights daily   [x] Continue w/ DM therapeutic diet education at f/u interview.     Monitoring and Evaluation:   Continue to monitor Nutritional intake, Tolerance to diet prescription, weights, labs, skin integrity.  other:  RD remains available upon request and will follow up per protocol.

## 2021-06-16 NOTE — PROGRESS NOTE ADULT - ASSESSMENT
BRIANNE SCOTT is a 71M with PMHx of prediabetes, COPD, and 1/2ppd smoker, who presented to [facility] 05/25/2021 with lacute right lacunar infarct, left hemiparesis and dysphagia, dysarthria, ataxia. Admitted for multidisciplinary rehab program.    #Acute right lacunar infarct  - ASA/Plavix for 90 days (through 8/25), then continue on ASA only  - atorvastatin 80mg qhs  - continue comprehensive rehab program PT/OT/ SLP 3 hours a day 5 days a week  - Precautions: respiratory, aspiration, fall    #COPD  - 1/2 ppd smoker; 25 pack years. Smoking cessation education reiterated with patient 6/9. Patient verbalized awareness, states he knows that he can control smoking and HTN to reduce risk, unlike age  - Nicotine patch 14mg/24hr x6 weeks, then 7mg/24hr x2 weeks  - symbicort and spiriva  - proventil HFA PRN    # HTN  - norvasc 2.5 daily (started 6/3)  --BP controlled--goal -150     #Prediabetes A1C 6.4  - consistent carb diet  - FS controlled, --stopped monitoring    #oral thrush:  - s/p nystatin swish and spit TID    # Left-sided shoulder pain: improved  -+ Rotator cuff pathology  - has chronic left-sided shoulder pain, but appears to have gotten worse since stroke. Likely exacerbation of existing pain 2/2 imbalance from compensation  - stretch during therapy, rotator cuff balance exercises  - cold compress after therapy PRN    #Abnormal CT Chest on 5/30  - patchy ground-glass and patchy opacities in left lower lobe  - COVID PCR: neg on 6/2  - Repeat follow up in 8 weeks    #Sleep:  - Melatonin   --sleeping through the night as per sleep log    #Pain:  - Tylenol PRN  - cold compress shoulder PRN    #GI/Bowel:  - Senna 2 tabs qhs and Miralax PRN    #Diet:  - carb consistent; soft with nectar-consistency liquids  - MBS 6/10 - 2/7 attempts with trace aspiration, cleared by cough  - Cleared by SLP for regular solids and thin liquids 6/11, DASH/low sodium-tolerating well    #Skin:  - Aquaphor to dry skin heels bilaterally  - Aquaphor to abdomen, as it appears calamine cream is drying skin; hypoallergenic sheets  - hydrocortisone cream bid x 5 days 6/10  - improved    #DVT prophylaxis:  - Lovenox  - SCDs    #Case discussed in IDT rounds 06/15/2021  - OT:  supervision-setup with eating, grooming, bathing and toileting;  Melissa shower, mod I dressing,   -PT: Supervision- transfers, ambulation 150 ft w/ RW, 12 steps  Speech-- regular diet--compensatory strategies after swallow, independent comprehension and expression, has good insight; Supervision PS and Memory.  close to baseline.    - Goals: Regan for ADLs, Supervision-- bathing and shower; independent for functional transfers and ambulation  - TDD: 6/17 with outpatient PT/OT/SLP    #LABS  CBC BMP 6/17

## 2021-06-16 NOTE — PROGRESS NOTE ADULT - SUBJECTIVE AND OBJECTIVE BOX
HPI:  BRIANNE SCOTT is a 71M LH-dominant with PMHx of prediabetes, COPD ( 1/2ppd smoker), who presented to [facility] 05/25/2021 with left-sided weakness. He experienced a similar episode several years ago when he was evaluated for a stroke and was informed he had imbalance in his ears and given medication for 3 days with resolution of symptoms. CT head and CT angio head/neck on admission negative for acute pathology. MR brain with findings c/w acute right lacunar infarct. He was started on ASA/Plavix. He failed his initial dysphagia screen, but passed MBS on 5/27, cleared for soft with nectar-thick fluids.     Hospital course was otherwise unremarkable. Patient was evaluated by PM&R and therapy for functional deficits and gait/ ADL impairments and recommended acute rehabilitation. Patient was medically optimized for discharge to [facility] on 06/03/2021. (03 Jun 2021 13:29)      PAST MEDICAL & SURGICAL HISTORY:  Chronic obstructive pulmonary disease, unspecified COPD type    Current smoker    Borderline diabetic    No significant past surgical history        Subjective:  No new complaints or overnight issues      VITALS  Vital Signs Last 24 Hrs  T(C): 36.4 (16 Jun 2021 07:45), Max: 36.4 (15 Duran 2021 20:39)  T(F): 97.5 (16 Jun 2021 07:45), Max: 97.6 (15 Duran 2021 20:39)  HR: 73 (16 Jun 2021 08:09) (73 - 84)  BP: 115/69 (16 Jun 2021 07:45) (115/69 - 130/69)  BP(mean): --  RR: 14 (16 Jun 2021 07:45) (14 - 14)  SpO2: 97% (16 Jun 2021 08:09) (97% - 98%)    REVIEW OF SYMPTOMS  Neurological deficits.       PHYSICAL EXAM  Constitutional - NAD, Comfortable  Chest - breathing comfortably on RA  Cardiovascular - warm and well perfused  Abdomen - BS+, Soft, NTND  Extremities - No LE edema  Neurologic Exam -                    Cognitive - Awake and alert     Communication - Fluent, No dysarthria     Motor -                     LEFT    UE - ShAB 4/5, EF 4/5, EE 4/5, WE 4/5,  4/5                    RIGHT UE - ShAB 5/5, EF 5/5, EE 5/5, WE 5/5,  5/5                    LEFT    LE - HF 5/5, KE 5/5, DF 5/5, PF 5/5                    RIGHT LE - HF 5/5, KE 5/5, DF 5/5, PF 5/5      Coordination-- impaired on left      Sensory - Intact to LT  Psychiatric - Mood stable, Affect WNL    RECENT LABS                  RADIOLOGY/OTHER RESULTS      MEDICATIONS  (STANDING):  amLODIPine   Tablet 2.5 milliGRAM(s) Oral daily  AQUAPHOR (petrolatum Ointment) 1 Application(s) Topical two times a day  aspirin enteric coated 81 milliGRAM(s) Oral daily  atorvastatin 80 milliGRAM(s) Oral at bedtime  budesonide  80 MICROgram(s)/formoterol 4.5 MICROgram(s) Inhaler 2 Puff(s) Inhalation two times a day  clopidogrel Tablet 75 milliGRAM(s) Oral daily  enoxaparin Injectable 40 milliGRAM(s) SubCutaneous daily  melatonin 3 milliGRAM(s) Oral at bedtime  nicotine -  14 mG/24Hr(s) Patch 1 Patch Transdermal daily  nystatin    Suspension 691400 Unit(s) Oral three times a day  senna 2 Tablet(s) Oral at bedtime  tiotropium 18 MICROgram(s) Capsule 1 Capsule(s) Inhalation daily    MEDICATIONS  (PRN):  acetaminophen   Tablet .. 650 milliGRAM(s) Oral every 6 hours PRN Temp greater or equal to 38C (100.4F), Mild Pain (1 - 3)  ALBUTerol    90 MICROgram(s) HFA Inhaler 2 Puff(s) Inhalation every 6 hours PRN Shortness of Breath and/or Wheezing  hydrocortisone 1% Ointment 1 Application(s) Topical every 8 hours PRN Rash and/or Itching  polyethylene glycol 3350 17 Gram(s) Oral two times a day PRN Constipation

## 2021-06-17 ENCOUNTER — TRANSCRIPTION ENCOUNTER (OUTPATIENT)
Age: 71
End: 2021-06-17

## 2021-06-17 VITALS
OXYGEN SATURATION: 96 % | HEART RATE: 91 BPM | DIASTOLIC BLOOD PRESSURE: 69 MMHG | TEMPERATURE: 99 F | RESPIRATION RATE: 14 BRPM | SYSTOLIC BLOOD PRESSURE: 120 MMHG

## 2021-06-17 LAB
ANION GAP SERPL CALC-SCNC: 11 MMOL/L — SIGNIFICANT CHANGE UP (ref 5–17)
BUN SERPL-MCNC: 12 MG/DL — SIGNIFICANT CHANGE UP (ref 7–23)
CALCIUM SERPL-MCNC: 9.6 MG/DL — SIGNIFICANT CHANGE UP (ref 8.4–10.5)
CHLORIDE SERPL-SCNC: 105 MMOL/L — SIGNIFICANT CHANGE UP (ref 96–108)
CO2 SERPL-SCNC: 27 MMOL/L — SIGNIFICANT CHANGE UP (ref 22–31)
CREAT SERPL-MCNC: 0.88 MG/DL — SIGNIFICANT CHANGE UP (ref 0.5–1.3)
GLUCOSE SERPL-MCNC: 96 MG/DL — SIGNIFICANT CHANGE UP (ref 70–99)
HCT VFR BLD CALC: 48.9 % — SIGNIFICANT CHANGE UP (ref 39–50)
HGB BLD-MCNC: 15.4 G/DL — SIGNIFICANT CHANGE UP (ref 13–17)
MCHC RBC-ENTMCNC: 27.3 PG — SIGNIFICANT CHANGE UP (ref 27–34)
MCHC RBC-ENTMCNC: 31.5 GM/DL — LOW (ref 32–36)
MCV RBC AUTO: 86.7 FL — SIGNIFICANT CHANGE UP (ref 80–100)
NRBC # BLD: 0 /100 WBCS — SIGNIFICANT CHANGE UP (ref 0–0)
PLATELET # BLD AUTO: 507 K/UL — HIGH (ref 150–400)
POTASSIUM SERPL-MCNC: 4.1 MMOL/L — SIGNIFICANT CHANGE UP (ref 3.5–5.3)
POTASSIUM SERPL-SCNC: 4.1 MMOL/L — SIGNIFICANT CHANGE UP (ref 3.5–5.3)
RBC # BLD: 5.64 M/UL — SIGNIFICANT CHANGE UP (ref 4.2–5.8)
RBC # FLD: 13.2 % — SIGNIFICANT CHANGE UP (ref 10.3–14.5)
SODIUM SERPL-SCNC: 143 MMOL/L — SIGNIFICANT CHANGE UP (ref 135–145)
WBC # BLD: 9.02 K/UL — SIGNIFICANT CHANGE UP (ref 3.8–10.5)
WBC # FLD AUTO: 9.02 K/UL — SIGNIFICANT CHANGE UP (ref 3.8–10.5)

## 2021-06-17 PROCEDURE — 99238 HOSP IP/OBS DSCHRG MGMT 30/<: CPT

## 2021-06-17 PROCEDURE — 97167 OT EVAL HIGH COMPLEX 60 MIN: CPT

## 2021-06-17 PROCEDURE — 92526 ORAL FUNCTION THERAPY: CPT

## 2021-06-17 PROCEDURE — 85025 COMPLETE CBC W/AUTO DIFF WBC: CPT

## 2021-06-17 PROCEDURE — U0003: CPT

## 2021-06-17 PROCEDURE — 97116 GAIT TRAINING THERAPY: CPT

## 2021-06-17 PROCEDURE — 74230 X-RAY XM SWLNG FUNCJ C+: CPT

## 2021-06-17 PROCEDURE — 97535 SELF CARE MNGMENT TRAINING: CPT

## 2021-06-17 PROCEDURE — 92507 TX SP LANG VOICE COMM INDIV: CPT

## 2021-06-17 PROCEDURE — 82962 GLUCOSE BLOOD TEST: CPT

## 2021-06-17 PROCEDURE — 92611 MOTION FLUOROSCOPY/SWALLOW: CPT

## 2021-06-17 PROCEDURE — 85027 COMPLETE CBC AUTOMATED: CPT

## 2021-06-17 PROCEDURE — U0005: CPT

## 2021-06-17 PROCEDURE — 97112 NEUROMUSCULAR REEDUCATION: CPT

## 2021-06-17 PROCEDURE — 97530 THERAPEUTIC ACTIVITIES: CPT

## 2021-06-17 PROCEDURE — 80048 BASIC METABOLIC PNL TOTAL CA: CPT

## 2021-06-17 PROCEDURE — 92523 SPEECH SOUND LANG COMPREHEN: CPT

## 2021-06-17 PROCEDURE — 97110 THERAPEUTIC EXERCISES: CPT

## 2021-06-17 PROCEDURE — 94640 AIRWAY INHALATION TREATMENT: CPT

## 2021-06-17 PROCEDURE — 97163 PT EVAL HIGH COMPLEX 45 MIN: CPT

## 2021-06-17 PROCEDURE — 80053 COMPREHEN METABOLIC PANEL: CPT

## 2021-06-17 PROCEDURE — 86769 SARS-COV-2 COVID-19 ANTIBODY: CPT

## 2021-06-17 PROCEDURE — 36415 COLL VENOUS BLD VENIPUNCTURE: CPT

## 2021-06-17 RX ORDER — ALBUTEROL 90 UG/1
2 AEROSOL, METERED ORAL
Qty: 0 | Refills: 0 | DISCHARGE

## 2021-06-17 RX ORDER — TIOTROPIUM BROMIDE 18 UG/1
1 CAPSULE ORAL; RESPIRATORY (INHALATION)
Qty: 1 | Refills: 0
Start: 2021-06-17 | End: 2021-07-16

## 2021-06-17 RX ORDER — ALBUTEROL 90 UG/1
2 AEROSOL, METERED ORAL
Qty: 1 | Refills: 0
Start: 2021-06-17 | End: 2021-07-16

## 2021-06-17 RX ADMIN — Medication 500000 UNIT(S): at 05:30

## 2021-06-17 RX ADMIN — CLOPIDOGREL BISULFATE 75 MILLIGRAM(S): 75 TABLET, FILM COATED ORAL at 11:17

## 2021-06-17 RX ADMIN — AMLODIPINE BESYLATE 2.5 MILLIGRAM(S): 2.5 TABLET ORAL at 05:31

## 2021-06-17 RX ADMIN — TIOTROPIUM BROMIDE 1 CAPSULE(S): 18 CAPSULE ORAL; RESPIRATORY (INHALATION) at 11:19

## 2021-06-17 RX ADMIN — Medication 1 APPLICATION(S): at 05:30

## 2021-06-17 RX ADMIN — Medication 1 PATCH: at 11:16

## 2021-06-17 RX ADMIN — Medication 1 PATCH: at 08:42

## 2021-06-17 RX ADMIN — Medication 81 MILLIGRAM(S): at 11:17

## 2021-06-17 NOTE — DISCHARGE NOTE NURSING/CASE MANAGEMENT/SOCIAL WORK - NSDCDMENUMBER_GEN_ALL_CORE_FT
CDC Testing and Quarantine Guidelines for Exposure:         A close exposure is defined as anyone who has had an exposure (masked or unmasked) to a known COVID -19 positive person within 6 ft for longer than 15 minutes. If your exposure meets this definition you are required by CDC guidelines to quarantine for at least 7-10 days from time of exposure. The CDC states that a test can be performed for an asymptomatic patient (someone who does not have any symptoms) after a close exposure, and that a test should be done if you develop symptoms after a close exposure as described above.  Specifically, you can test at day 5 or later if asymptomatic, in order to get released from quarantine on day 7 or later.  If you develop symptoms sooner, you should test when your symptoms start.  If you meet the definition of a close exposure, it will not matter whether you are experiencing symptoms- a quarantine for at least 7-10 days after a close exposure is required by CDC guidelines.  Please note, if you decide to test as an asymptomatic during your quarantine and you are positive, you will be restarting your quarantine and moving from a possible 10 day quarantine (if you do not test), to a 11 day or greater quarantine.  The CDC also suggests people still monitor for symptoms for a full 14 days and remember that the shorter quarantine options do not replace initial CDC guidance.  The CDC continues to recommend quarantining for 14 days as the best way to reduce risk for spreading COVID-19 - however, this is only a recommendation.  If your exposure does not meet the above definition, you can return to your normal daily activities to include social distancing, wearing a mask and frequent handwashing    
[phone]

## 2021-06-17 NOTE — DISCHARGE NOTE NURSING/CASE MANAGEMENT/SOCIAL WORK - NSDCFUADDAPPT_GEN_ALL_CORE_FT
The following was scheduled:  PCP: Dr. Neri Ybarra  Appointment Date:  Appointment Time:  Office number: [phone] The following was scheduled:  PCP: Dr. Neri Ybarar  Appointment Date: 6/22/21  Appointment Time: 10am  Office number: [phone]

## 2021-06-17 NOTE — PROGRESS NOTE ADULT - SUBJECTIVE AND OBJECTIVE BOX
HPI:  BRIANNE SCOTT is a 71M LH-dominant with PMHx of prediabetes, COPD ( 1/2ppd smoker), who presented to [facility] 05/25/2021 with left-sided weakness. He experienced a similar episode several years ago when he was evaluated for a stroke and was informed he had imbalance in his ears and given medication for 3 days with resolution of symptoms. CT head and CT angio head/neck on admission negative for acute pathology. MR brain with findings c/w acute right lacunar infarct. He was started on ASA/Plavix. He failed his initial dysphagia screen, but passed MBS on 5/27, cleared for soft with nectar-thick fluids.     Hospital course was otherwise unremarkable. Patient was evaluated by PM&R and therapy for functional deficits and gait/ ADL impairments and recommended acute rehabilitation. Patient was medically optimized for discharge to [facility] on 06/03/2021. (03 Jun 2021 13:29)      PAST MEDICAL & SURGICAL HISTORY:  Chronic obstructive pulmonary disease, unspecified COPD type    Current smoker    Borderline diabetic    No significant past surgical history        Subjective:  No new complaints or overnight issues.       VITALS  Vital Signs Last 24 Hrs  T(C): 37.2 (17 Jun 2021 08:40), Max: 37.2 (17 Jun 2021 08:40)  T(F): 98.9 (17 Jun 2021 08:40), Max: 98.9 (17 Jun 2021 08:40)  HR: 91 (17 Jun 2021 08:40) (65 - 91)  BP: 120/69 (17 Jun 2021 08:40) (111/67 - 120/69)  BP(mean): --  RR: 14 (17 Jun 2021 08:40) (14 - 14)  SpO2: 96% (17 Jun 2021 08:40) (96% - 97%)    REVIEW OF SYMPTOMS  Neurological deficits.       PHYSICAL EXAM  Constitutional - NAD, Comfortable  Chest - breathing comfortably on RA  Cardiovascular - warm and well perfused  Abdomen - BS+, Soft, NTND  Extremities - No LE edema  Neurologic Exam -                    Cognitive - Awake and alert     Communication - Fluent, No dysarthria     Motor -                     LEFT    UE - ShAB 4/5, EF 4/5, EE 4/5, WE 4/5,  4/5                    RIGHT UE - ShAB 5/5, EF 5/5, EE 5/5, WE 5/5,  5/5                    LEFT    LE - HF 5/5, KE 5/5, DF 5/5, PF 5/5                    RIGHT LE - HF 5/5, KE 5/5, DF 5/5, PF 5/5      Coordination-- impaired on left UE,  intact left LE     Sensory - Intact to LT  Psychiatric - Mood stable, Affect WNL      RECENT LABS                        15.4   9.02  )-----------( 507      ( 17 Jun 2021 07:15 )             48.9     06-17    143  |  105  |  12  ----------------------------<  96  4.1   |  27  |  0.88    Ca    9.6      17 Jun 2021 07:15              RADIOLOGY/OTHER RESULTS      MEDICATIONS  (STANDING):  amLODIPine   Tablet 2.5 milliGRAM(s) Oral daily  AQUAPHOR (petrolatum Ointment) 1 Application(s) Topical two times a day  aspirin enteric coated 81 milliGRAM(s) Oral daily  atorvastatin 80 milliGRAM(s) Oral at bedtime  budesonide  80 MICROgram(s)/formoterol 4.5 MICROgram(s) Inhaler 2 Puff(s) Inhalation two times a day  clopidogrel Tablet 75 milliGRAM(s) Oral daily  enoxaparin Injectable 40 milliGRAM(s) SubCutaneous daily  melatonin 3 milliGRAM(s) Oral at bedtime  nicotine -  14 mG/24Hr(s) Patch 1 Patch Transdermal daily  nystatin    Suspension 956550 Unit(s) Oral three times a day  senna 2 Tablet(s) Oral at bedtime  tiotropium 18 MICROgram(s) Capsule 1 Capsule(s) Inhalation daily    MEDICATIONS  (PRN):  acetaminophen   Tablet .. 650 milliGRAM(s) Oral every 6 hours PRN Temp greater or equal to 38C (100.4F), Mild Pain (1 - 3)  ALBUTerol    90 MICROgram(s) HFA Inhaler 2 Puff(s) Inhalation every 6 hours PRN Shortness of Breath and/or Wheezing  hydrocortisone 1% Ointment 1 Application(s) Topical every 8 hours PRN Rash and/or Itching  polyethylene glycol 3350 17 Gram(s) Oral two times a day PRN Constipation

## 2021-06-17 NOTE — PROGRESS NOTE ADULT - REASON FOR ADMISSION
right lacunar CVA

## 2021-06-17 NOTE — DISCHARGE NOTE NURSING/CASE MANAGEMENT/SOCIAL WORK - PATIENT PORTAL LINK FT
You can access the FollowMyHealth Patient Portal offered by [facility] by registering at the following website: [email or web address]. By joining [facility]’s FollowMyHealth portal, you will also be able to view your health information using other applications (apps) compatible with our system.

## 2021-06-17 NOTE — DISCHARGE NOTE NURSING/CASE MANAGEMENT/SOCIAL WORK - NSDCPEWEB_GEN_ALL_CORE
[facility] for Tobacco Control website --- [email or web address] website --- [email or web address]

## 2021-06-17 NOTE — DISCHARGE NOTE NURSING/CASE MANAGEMENT/SOCIAL WORK - NSDCPEEMAIL_GEN_ALL_CORE
[facility] for Tobacco Control email [email or web address]

## 2021-06-17 NOTE — PROGRESS NOTE ADULT - NSICDXPILOT_GEN_ALL_CORE
[place]

## 2021-06-17 NOTE — PROGRESS NOTE ADULT - ASSESSMENT
BRIANNE SCOTT is a 71M with PMHx of prediabetes, COPD, and 1/2ppd smoker, who presented to [facility] 05/25/2021 with lacute right lacunar infarct, left hemiparesis and dysphagia, dysarthria, ataxia. Admitted for multidisciplinary rehab program.    Patient medically stable for discharge to home today.  Discharge medications and instructions reviewed with patient and pt's son.  All questions answered.     #Acute right lacunar infarct  - ASA/Plavix for 90 days (through 8/25), then continue on ASA only  - atorvastatin 80mg qhs  - continue comprehensive rehab program PT/OT/ SLP with outpatient therapy--rx's provided      #COPD  - 1/2 ppd smoker; 25 pack years. Smoking cessation education reiterated with patient 6/9. Patient verbalized awareness, states he knows that he can control smoking and HTN to reduce risk, unlike age  - Nicotine patch 14mg/24hr x6 weeks, then 7mg/24hr x2 weeks  - symbicort and spiriva  - proventil HFA PRN    # HTN  - cont. norvasc 2.5 daily (started 6/3)  --BP controlled--goal -150     #Prediabetes A1C 6.4  - consistent carb diet  - FS controlled, --stopped monitoring      # Left-sided shoulder pain: improved  -+ Rotator cuff pathology  - has chronic left-sided shoulder pain,   --pain improved with therapy    #Abnormal CT Chest on 5/30  - patchy ground-glass and patchy opacities in left lower lobe  - COVID PCR: neg on 6/2  - Repeat follow up in 8 weeks        #Pain:  - Tylenol PRN  - cold compress shoulder PRN    #GI/Bowel:  - Senna 2 tabs qhs and Miralax PRN    #Diet:  - carb consistent; soft with nectar-consistency liquids  - MBS 6/10 - 2/7 attempts with trace aspiration, cleared by cough  - Cleared by SLP for regular solids and thin liquids 6/11, DASH/low sodium-tolerating well    #Skin:  - Aquaphor to dry skin heels bilaterally  - Aquaphor to abdomen, as it appears calamine cream is drying skin; hypoallergenic sheets  - hydrocortisone cream bid x 5 days 6/10  - improved        #Case discussed in IDT rounds 06/15/2021  - OT:  supervision-setup with eating, grooming, bathing and toileting;  Melissa shower, mod I dressing,   -PT: Supervision- transfers, ambulation 150 ft w/ RW, 12 steps  Speech-- regular diet--compensatory strategies after swallow, independent comprehension and expression, has good insight; Supervision PS and Memory.  close to baseline.    - Goals: Regan for ADLs, Supervision-- bathing and shower; independent for functional transfers and ambulation  - TDD: 6/17 with outpatient PT/OT/SLP    #LABS  CBC BMP 6/17

## 2021-06-18 RX ORDER — NICOTINE POLACRILEX 2 MG
1 GUM BUCCAL
Qty: 19 | Refills: 0
Start: 2021-06-18 | End: 2021-07-06

## 2021-06-22 PROBLEM — Z00.00 ENCOUNTER FOR PREVENTIVE HEALTH EXAMINATION: Status: ACTIVE | Noted: 2021-06-22

## 2021-07-07 RX ORDER — NICOTINE POLACRILEX 2 MG
1 GUM BUCCAL
Qty: 14 | Refills: 0
Start: 2021-07-07 | End: 2021-07-20

## 2021-08-02 ENCOUNTER — INPATIENT (INPATIENT)
Facility: HOSPITAL | Age: 71
LOS: 0 days | Discharge: HOME | DRG: 57 | End: 2021-08-03
Attending: HOSPITALIST | Admitting: INTERNAL MEDICINE
Payer: MEDICARE

## 2021-08-02 VITALS
OXYGEN SATURATION: 96 % | DIASTOLIC BLOOD PRESSURE: 64 MMHG | WEIGHT: 126.1 LBS | SYSTOLIC BLOOD PRESSURE: 184 MMHG | RESPIRATION RATE: 16 BRPM | HEIGHT: 64 IN | TEMPERATURE: 98 F | HEART RATE: 73 BPM

## 2021-08-02 DIAGNOSIS — H11.32 CONJUNCTIVAL HEMORRHAGE, LEFT EYE: ICD-10-CM

## 2021-08-02 DIAGNOSIS — R93.0 ABNORMAL FINDINGS ON DIAGNOSTIC IMAGING OF SKULL AND HEAD, NOT ELSEWHERE CLASSIFIED: ICD-10-CM

## 2021-08-02 DIAGNOSIS — I10 ESSENTIAL (PRIMARY) HYPERTENSION: ICD-10-CM
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
## 2021-08-02 DIAGNOSIS — S09.90XA UNSPECIFIED INJURY OF HEAD, INITIAL ENCOUNTER: ICD-10-CM

## 2021-08-02 DIAGNOSIS — F17.200 NICOTINE DEPENDENCE, UNSPECIFIED, UNCOMPLICATED: ICD-10-CM

## 2021-08-02 DIAGNOSIS — Z29.9 ENCOUNTER FOR PROPHYLACTIC MEASURES, UNSPECIFIED: ICD-10-CM

## 2021-08-02 DIAGNOSIS — J44.9 CHRONIC OBSTRUCTIVE PULMONARY DISEASE, UNSPECIFIED: ICD-10-CM

## 2021-08-02 DIAGNOSIS — Z86.73 PERSONAL HISTORY OF TRANSIENT ISCHEMIC ATTACK (TIA), AND CEREBRAL INFARCTION WITHOUT RESIDUAL DEFICITS: ICD-10-CM

## 2021-08-02 LAB
ALBUMIN SERPL ELPH-MCNC: 3.9 G/DL — SIGNIFICANT CHANGE UP (ref 3.3–5)
ALP SERPL-CCNC: 79 U/L — SIGNIFICANT CHANGE UP (ref 40–120)
ALT FLD-CCNC: 46 U/L — SIGNIFICANT CHANGE UP (ref 12–78)
ANION GAP SERPL CALC-SCNC: 5 MMOL/L — SIGNIFICANT CHANGE UP (ref 5–17)
APTT BLD: 35.7 SEC — HIGH (ref 27.5–35.5)
AST SERPL-CCNC: 25 U/L — SIGNIFICANT CHANGE UP (ref 15–37)
BASOPHILS # BLD AUTO: 0.05 K/UL — SIGNIFICANT CHANGE UP (ref 0–0.2)
BASOPHILS NFR BLD AUTO: 0.6 % — SIGNIFICANT CHANGE UP (ref 0–2)
BILIRUB SERPL-MCNC: 0.6 MG/DL — SIGNIFICANT CHANGE UP (ref 0.2–1.2)
BUN SERPL-MCNC: 12 MG/DL — SIGNIFICANT CHANGE UP (ref 7–23)
CALCIUM SERPL-MCNC: 8.9 MG/DL — SIGNIFICANT CHANGE UP (ref 8.5–10.1)
CHLORIDE SERPL-SCNC: 109 MMOL/L — HIGH (ref 96–108)
CO2 SERPL-SCNC: 28 MMOL/L — SIGNIFICANT CHANGE UP (ref 22–31)
CREAT SERPL-MCNC: 0.74 MG/DL — SIGNIFICANT CHANGE UP (ref 0.5–1.3)
EOSINOPHIL # BLD AUTO: 0.1 K/UL — SIGNIFICANT CHANGE UP (ref 0–0.5)
EOSINOPHIL NFR BLD AUTO: 1.1 % — SIGNIFICANT CHANGE UP (ref 0–6)
GLUCOSE SERPL-MCNC: 104 MG/DL — HIGH (ref 70–99)
HCT VFR BLD CALC: 48.3 % — SIGNIFICANT CHANGE UP (ref 39–50)
HGB BLD-MCNC: 15.2 G/DL — SIGNIFICANT CHANGE UP (ref 13–17)
IMM GRANULOCYTES NFR BLD AUTO: 0.3 % — SIGNIFICANT CHANGE UP (ref 0–1.5)
INR BLD: 0.9 RATIO — SIGNIFICANT CHANGE UP (ref 0.88–1.16)
LYMPHOCYTES # BLD AUTO: 2.59 K/UL — SIGNIFICANT CHANGE UP (ref 1–3.3)
LYMPHOCYTES # BLD AUTO: 29.7 % — SIGNIFICANT CHANGE UP (ref 13–44)
MCHC RBC-ENTMCNC: 26.5 PG — LOW (ref 27–34)
MCHC RBC-ENTMCNC: 31.5 GM/DL — LOW (ref 32–36)
MCV RBC AUTO: 84.1 FL — SIGNIFICANT CHANGE UP (ref 80–100)
MONOCYTES # BLD AUTO: 1.05 K/UL — HIGH (ref 0–0.9)
MONOCYTES NFR BLD AUTO: 12.1 % — SIGNIFICANT CHANGE UP (ref 2–14)
NEUTROPHILS # BLD AUTO: 4.89 K/UL — SIGNIFICANT CHANGE UP (ref 1.8–7.4)
NEUTROPHILS NFR BLD AUTO: 56.2 % — SIGNIFICANT CHANGE UP (ref 43–77)
NRBC # BLD: 0 /100 WBCS — SIGNIFICANT CHANGE UP (ref 0–0)
PLATELET # BLD AUTO: 380 K/UL — SIGNIFICANT CHANGE UP (ref 150–400)
POTASSIUM SERPL-MCNC: 3.8 MMOL/L — SIGNIFICANT CHANGE UP (ref 3.5–5.3)
POTASSIUM SERPL-SCNC: 3.8 MMOL/L — SIGNIFICANT CHANGE UP (ref 3.5–5.3)
PROT SERPL-MCNC: 7.5 G/DL — SIGNIFICANT CHANGE UP (ref 6–8.3)
PROTHROM AB SERPL-ACNC: 10.6 SEC — SIGNIFICANT CHANGE UP (ref 10.6–13.6)
RBC # BLD: 5.74 M/UL — SIGNIFICANT CHANGE UP (ref 4.2–5.8)
RBC # FLD: 14.2 % — SIGNIFICANT CHANGE UP (ref 10.3–14.5)
SARS-COV-2 RNA SPEC QL NAA+PROBE: SIGNIFICANT CHANGE UP
SODIUM SERPL-SCNC: 142 MMOL/L — SIGNIFICANT CHANGE UP (ref 135–145)
WBC # BLD: 8.71 K/UL — SIGNIFICANT CHANGE UP (ref 3.8–10.5)
WBC # FLD AUTO: 8.71 K/UL — SIGNIFICANT CHANGE UP (ref 3.8–10.5)

## 2021-08-02 PROCEDURE — 70450 CT HEAD/BRAIN W/O DYE: CPT | Mod: 26

## 2021-08-02 PROCEDURE — 99285 EMERGENCY DEPT VISIT HI MDM: CPT

## 2021-08-02 PROCEDURE — 99497 ADVNCD CARE PLAN 30 MIN: CPT | Mod: 25

## 2021-08-02 PROCEDURE — 93010 ELECTROCARDIOGRAM REPORT: CPT

## 2021-08-02 PROCEDURE — 99223 1ST HOSP IP/OBS HIGH 75: CPT | Mod: 25,GC

## 2021-08-02 PROCEDURE — 72125 CT NECK SPINE W/O DYE: CPT | Mod: 26

## 2021-08-02 PROCEDURE — 71045 X-RAY EXAM CHEST 1 VIEW: CPT | Mod: 26

## 2021-08-02 RX ORDER — LANOLIN ALCOHOL/MO/W.PET/CERES
3 CREAM (GRAM) TOPICAL AT BEDTIME
Refills: 0 | Status: DISCONTINUED | OUTPATIENT
Start: 2021-08-02 | End: 2021-08-03

## 2021-08-02 RX ORDER — ATORVASTATIN CALCIUM 80 MG/1
80 TABLET, FILM COATED ORAL AT BEDTIME
Refills: 0 | Status: DISCONTINUED | OUTPATIENT
Start: 2021-08-02 | End: 2021-08-03

## 2021-08-02 RX ORDER — ACETAMINOPHEN 500 MG
650 TABLET ORAL EVERY 6 HOURS
Refills: 0 | Status: DISCONTINUED | OUTPATIENT
Start: 2021-08-02 | End: 2021-08-03

## 2021-08-02 RX ORDER — AMLODIPINE BESYLATE 2.5 MG/1
2.5 TABLET ORAL DAILY
Refills: 0 | Status: DISCONTINUED | OUTPATIENT
Start: 2021-08-02 | End: 2021-08-02

## 2021-08-02 RX ORDER — NICOTINE POLACRILEX 2 MG
1 GUM BUCCAL DAILY
Refills: 0 | Status: DISCONTINUED | OUTPATIENT
Start: 2021-08-02 | End: 2021-08-03

## 2021-08-02 RX ORDER — ASPIRIN/CALCIUM CARB/MAGNESIUM 324 MG
81 TABLET ORAL DAILY
Refills: 0 | Status: DISCONTINUED | OUTPATIENT
Start: 2021-08-02 | End: 2021-08-03

## 2021-08-02 RX ORDER — CLOPIDOGREL BISULFATE 75 MG/1
75 TABLET, FILM COATED ORAL DAILY
Refills: 0 | Status: DISCONTINUED | OUTPATIENT
Start: 2021-08-02 | End: 2021-08-03

## 2021-08-02 RX ORDER — CHOLECALCIFEROL (VITAMIN D3) 125 MCG
1 CAPSULE ORAL
Qty: 0 | Refills: 0 | DISCHARGE

## 2021-08-02 RX ORDER — ALBUTEROL 90 UG/1
2 AEROSOL, METERED ORAL EVERY 6 HOURS
Refills: 0 | Status: DISCONTINUED | OUTPATIENT
Start: 2021-08-02 | End: 2021-08-03

## 2021-08-02 RX ORDER — TIOTROPIUM BROMIDE AND OLODATEROL 3.124; 2.736 UG/1; UG/1
2 SPRAY, METERED RESPIRATORY (INHALATION)
Qty: 0 | Refills: 0 | DISCHARGE

## 2021-08-02 RX ORDER — TIOTROPIUM BROMIDE 18 UG/1
1 CAPSULE ORAL; RESPIRATORY (INHALATION) DAILY
Refills: 0 | Status: DISCONTINUED | OUTPATIENT
Start: 2021-08-02 | End: 2021-08-03

## 2021-08-02 RX ORDER — ENOXAPARIN SODIUM 100 MG/ML
40 INJECTION SUBCUTANEOUS DAILY
Refills: 0 | Status: DISCONTINUED | OUTPATIENT
Start: 2021-08-02 | End: 2021-08-03

## 2021-08-02 NOTE — ED PROVIDER NOTE - PROGRESS NOTE DETAILS
spoke with radiologist regarding CT results. discussed patient had CVA 2 months ago. compared CT to previous MRI. states appears to be in same location but possibly a little larger. recommends repeat MRI for comparison. spoke with Dr. Ly who also reviewed CT and MRI. also agrees symptoms are likely due to previous CVA, but would need repeat MRI for comparison. spoke with MRI tech, unable to order MRI from ED at this time due to too many cases at this time. will need admission for MRI. spoke with Dr. Austin Santiago, kindly accepts patient for admission

## 2021-08-02 NOTE — H&P ADULT - PROBLEM SELECTOR PLAN 5
Chronic, stable   - Hypertensive on admission in ER  - Continue amlodipine 2.5 mg daily, increase if needed Chronic, stable   - Hypertensive on admission in ER  - Continue amlodipine 2.5 mg daily, increase if needed  - Allow permissive Hypertension Chronic, stable   - Hypertensive on admission in ER  - Allow permissive Hypertension

## 2021-08-02 NOTE — H&P ADULT - ATTENDING COMMENTS
70 yo M pmhx prediabetes, current smoker, COPD, HTN, CVA in May 2021 who presents to the ER s/p fall, r/o subacute CVA.       T(C): 36.4 (08-02-21 @ 11:25), Max: 36.4 (08-02-21 @ 11:25)  HR: 73 (08-02-21 @ 11:25) (73 - 73)  BP: 184/64 (08-02-21 @ 11:25) (184/64 - 184/64)  RR: 16 (08-02-21 @ 11:25) (16 - 16)  SpO2: 96% (08-02-21 @ 11:25) (96% - 96%)  Wt(kg): --    Physical Exam:   GENERAL: well-groomed, well-developed, NAD  HEENT: head NC/AT; EOM intact, PERRLA, L eye subconjuntival hemorrhage,  hearing grossly intact, moist mucous membranes  NECK: supple, no JVD  RESPIRATORY: CTA B/L, no wheezing, rales, rhonchi or rubs  CARDIOVASCULAR: S1&S2, RRR, no murmurs or gallops  ABDOMEN: soft, non-tender, non-distended, + Bowel sounds x4 quadrants, no guarding, rebound or rigidity  MUSCULOSKELETAL:  no clubbing, cyanosis or edema of all 4 extremities  LYMPH: no cervical lymphadenopathy  VASCULAR: Radial pulses 2+ bilaterally, no varicose veins   SKIN: warm and dry, color normal  NEUROLOGIC: AA&O X3, CN2-12 intact w/ no focal deficits, no sensory loss, motor Strength 5/5 in UE & LE B/L [do not appreciate any weakness or asymetric muscle strength on exam]  Psych: Normal mood and affect, normal behavior    -admit to eval for CVA  -f/u MRI results  -neuro consulted  -neuro checks q4h and permissive HTN  -remainder as above

## 2021-08-02 NOTE — H&P ADULT - PROBLEM SELECTOR PLAN 2
CT head s/p fall shows no acute hemorrhage, contusion, or collections but shows right periventricular basilar infarct which could be recent and/or subacute  - Dr Ly made aware and reviewed scans, recommending MRI to r/o subacute stroke   - Will continue patient on ASA, Plavix (until 8/25), and statin.   - Neuro (Dr. Ly) consulted; f/u recs CT head s/p fall shows no acute hemorrhage, contusion, or collections but shows right periventricular basilar infarct which could be recent and/or subacute  - Dr Ly made aware and reviewed scans, recommending MRI to r/o subacute stroke   - Will continue patient on ASA, Plavix (until 8/25), and statin.   - Neuro (Dr. Ly) consulted; f/u recommendations  - Allow permissive Hypertension CT head s/p fall shows no acute hemorrhage, contusion, or collections but shows right periventricular basilar infarct which could be recent and/or subacute  - Dr Ly made aware and reviewed scans, recommending MRI to r/o subacute stroke   - Will continue patient on ASA, Plavix (until 8/25), and statin.   - Neuro (Dr. Ly) consulted; f/u recommendations  - Allow permissive Hypertension  -passed bedside dysphagia screen by RN

## 2021-08-02 NOTE — ED PROVIDER NOTE - CLINICAL SUMMARY MEDICAL DECISION MAKING FREE TEXT BOX
presents for eval of head injury after fall at PT [facility]. lost balance and fell. hit back of head. no blood thinner. since on blood thinner, will CT head and neck. no ext tenderness to suggest ext fx Jack: presents for eval of head injury after fall at PT [facility]. lost balance and fell. hit back of head. no blood thinner. since on blood thinner, will CT head and neck. no ext tenderness to suggest ext fx. CT head shows questionable  acute v subacute stroke but may be evolution of his recent stroke, will need MRI brain as per neurology consult

## 2021-08-02 NOTE — ED ADULT NURSE NOTE - NSIMPLEMENTINTERV_GEN_ALL_ED
Implemented All Fall with Harm Risk Interventions:  [place] to call system. Call bell, personal items and telephone within reach. Instruct patient to call for assistance. Room bathroom lighting operational. Non-slip footwear when patient is off stretcher. Physically safe environment: no spills, clutter or unnecessary equipment. Stretcher in lowest position, wheels locked, appropriate side rails in place. Provide visual cue, wrist band, yellow gown, etc. Monitor gait and stability. Monitor for mental status changes and reorient to person, place, and time. Review medications for side effects contributing to fall risk. Reinforce activity limits and safety measures with patient and family. Provide visual clues: red socks.

## 2021-08-02 NOTE — H&P ADULT - HISTORY OF PRESENT ILLNESS
72 yo M pmhx prediabetes, current smoker, COPD, CVA in May 2021 who presents to the ER s/p fall.         In the ED:  70 yo M pmhx prediabetes, current smoker, COPD, HTN, CVA in May 2021 who presents to the ER s/p fall.         In the ED:   VS: T: 97.6, HR: 73, BP: 184/64, RR: 16, SpO2: 96% on room air   Labs were within normal limits   EKG:   CT head: 1)  right periventricular basilar infarct which could be recent and/or subacute. Follow-up MR imaging recommended..2)  no intracerebral hemorrhage, contusion, or extracerebral collections are identified.  CT Cervical spine: No acute fracture identified. 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 72 yo M pmhx prediabetes, former smoker (quit 2 months ago), COPD, HTN, CVA in May 2021 who presents to the ER s/p fall.         In the ED:   VS: T: 97.6, HR: 73, BP: 184/64, RR: 16, SpO2: 96% on room air   Labs were within normal limits   EKG:   CT head: 1)  right periventricular basilar infarct which could be recent and/or subacute. Follow-up MR imaging recommended..2)  no intracerebral hemorrhage, contusion, or extracerebral collections are identified.  CT Cervical spine: No acute fracture identified. 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 72 yo M pmhx prediabetes, former smoker (quit 2 months ago, using nicotine patch), COPD, HTN, CVA in May 2021 who presents to the ER s/p fall. Patient was at his physical therapist's office this morning when he fell spontaneously and felt weakness in his L leg afterwards. He was brought to [facility] by ambulance. Patient denies loss of consciousness, urinary/stool incontinence, h/a, dizziness, numbness/tingling, n/v. At present, his leg weakness has partially resolved from this morning, but feels weaker than it did yesterday. Patient was admitted to [facility] for stroke 2 months ago and experienced L leg weakness at that time.           In the ED:   VS: T: 97.6, HR: 73, BP: 184/64, RR: 16, SpO2: 96% on room air   Labs were within normal limits   EKG:   CT head: 1)  right periventricular basilar infarct which could be recent and/or subacute. Follow-up MR imaging recommended..2)  no intracerebral hemorrhage, contusion, or extracerebral collections are identified.  CT Cervical spine: No acute fracture identified. 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 70 yo M pmhx prediabetes, former smoker (quit 2 months ago, using nicotine patch), COPD, HTN, CVA in May 2021 who presents to the ER s/p fall. Patient was at his physical therapist's office this morning when he fell spontaneously and felt weakness in his L leg afterwards. He was brought to [facility] by ambulance. Patient denies loss of consciousness, urinary/stool incontinence, h/a, dizziness, numbness/tingling, n/v. At present, his leg weakness has partially resolved from this morning, but feels weaker than it did yesterday. Patient was admitted to [facility] for stroke 2 months ago and experienced L leg weakness at that time.     In the ED:   VS: T: 97.6, HR: 73, BP: 184/64, RR: 16, SpO2: 96% on room air   Labs were within normal limits   CT head: 1)  right periventricular basilar infarct which could be recent and/or subacute. Follow-up MR imaging recommended..2)  no intracerebral hemorrhage, contusion, or extracerebral collections are identified.  CT Cervical spine: No acute fracture identified. 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

## 2021-08-02 NOTE — ED PROVIDER NOTE - OBJECTIVE STATEMENT
71 year old male with history of DM, COPD, and CVA (2 months ago) BIBA for evaluation after fall at PT PTA. was at PT this am due to mild CVA 2 months ago and continued weakness. patient states lost balance and hit "back of head lightly". no LOC. denies HA, dizziness, confusion, neck pain, back pain, ext pain, chest pain, or abd pain. no pain or complaints. since on blood thinner (plavix and ASA), sent to ED for CT head   PCP Neri Ybarra

## 2021-08-02 NOTE — H&P ADULT - PROBLEM SELECTOR PLAN 3
Patient recently admitted for CVA in May 2021, CT and CTA were negative at the time however MRI showed small focus of restricted diffusion within the right posterior corona radiata measuring approximately 1.5 x 0.9 cm, compatible with an acute lacunar infarct.   - F/u repeat MRI to r/o subacute infarct   - Continue aspirin, Plavix (until 8/25) and statin

## 2021-08-02 NOTE — ED PROVIDER NOTE - CARE PLAN
Principal Discharge DX:	Closed head injury, initial encounter  Secondary Diagnosis:	Fall, initial encounter  Secondary Diagnosis:	Abnormal CT scan of head

## 2021-08-02 NOTE — ED PROVIDER NOTE - MUSCULOSKELETAL, MLM
Spine appears normal, range of motion is not limited, no vert tenderness or step off deformities. no ext tenderness or swelling

## 2021-08-02 NOTE — ED ADULT NURSE NOTE - PMH
Borderline diabetic    Chronic obstructive pulmonary disease, unspecified COPD type    Current smoker

## 2021-08-02 NOTE — H&P ADULT - NSICDXPASTMEDICALHX_GEN_ALL_CORE_FT
PAST MEDICAL HISTORY:  Borderline diabetic     Chronic obstructive pulmonary disease, unspecified COPD type     Current smoker     CVA (cerebrovascular accident)      PAST MEDICAL HISTORY:  Borderline diabetic     Chronic obstructive pulmonary disease, unspecified COPD type     CVA (cerebrovascular accident)     Former smoker

## 2021-08-02 NOTE — GOALS OF CARE CONVERSATION - ADVANCED CARE PLANNING - CONVERSATION DETAILS
Patients code status was discussed with patient at bedside. Goals of care were discussed with patient including the importance of having an advance directive and Health Care agent. Goals of care discussed included need for possible intubation and CPR if patient clinically deteriorates. Patient was informed about the role of a MOLST form.   17 minutes were spent discussing advanced care planning and this was separate from management of patients medical problems.

## 2021-08-02 NOTE — H&P ADULT - ASSESSMENT
70 yo M pmhx prediabetes, current smoker, COPD, HTN, CVA in May 2021 who presents to the ER s/p fall, r/o subacute CVA.

## 2021-08-02 NOTE — H&P ADULT - PROBLEM SELECTOR PLAN 1
Patient presenting s/p fall at PT session today, likely mechanical during PT session   - CT head significant for right periventricular basilar infarct which could be recent and/or subacute. Follow-up MR imaging recommended..2)  no intracerebral hemorrhage, contusion, or extracerebral collections are identified.  - Given abnormal CT head findings, obtain MRI to r/o subacute infarct   - Fall precautions, bed alarm, and chair alarm ordered   - OOB with assistance   - PT and OT consulted; f/u recs  - Social work consulted; f/u recs Patient presenting s/p fall at PT session today, likely mechanical during PT session 2/2 to residual L leg weakness from prior stroke 2 months ago  - CT head significant for right periventricular basilar infarct which could be recent and/or subacute. Follow-up MR imaging recommended..2)  no intracerebral hemorrhage, contusion, or extracerebral collections are identified.  - Given abnormal CT head findings, obtain MRI to r/o subacute infarct   - Fall precautions, bed alarm, and chair alarm ordered   - OOB with assistance   - Ambulate with assistance  - PT and OT consulted; f/u recs  - Social work consulted; f/u recs

## 2021-08-02 NOTE — ED ADULT NURSE NOTE - CHPI ED NUR SYMPTOMS NEG
no pain/no abrasion/no bleeding/no confusion/no deformity/no loss of consciousness/no numbness/no tingling/no weakness

## 2021-08-02 NOTE — H&P ADULT - NSHPSOCIALHISTORY_GEN_ALL_CORE
Smokin/2 pack/day since teens; 25 pack year history  ETOH: denies  ADLS: independently  Lives with son  CODE: Full Smoking: Smoked 1ppd for 50 years, quit 2 months ago, uses nicotine patch  ETOH: denies  Drugs: denies  ADLS: independently  Lives with son and wife  Ambulates with cane  Diet normal  CODE: DNR/DNI  Covid vaccine: Moderna, 2nd dose in May Smoking: Smoked 1ppd for 50 years, quit 2 months ago, uses nicotine patch  ETOH: denies  Drugs: denies  ADLS: independently  Lives with son and wife  Ambulates with cane  Diet normal  CODE: DNR/DNI  Covid vaccine: Moderna, 2nd dose in May  Health Care Proxy: John Stapleton [Son] [phone]

## 2021-08-02 NOTE — H&P ADULT - NSHPREVIEWOFSYSTEMS_GEN_ALL_CORE
REVIEW OF SYSTEMS:    CONSTITUTIONAL: No fevers or chills  EYES: No visual changes; L eye subconjunctival hemorrhage, no pain or drainage  ENT:  No throat pain   NECK: No pain or stiffness  RESPIRATORY: No cough, wheezing, hemoptysis; No shortness of breath  CARDIOVASCULAR: No chest pain or palpitations  GASTROINTESTINAL: No abdominal or epigastric pain. No nausea, vomiting, or hematemesis; No diarrhea or constipation. No melena or hematochezia.  GENITOURINARY: No dysuria, frequency or hematuria  NEUROLOGICAL: No numbness or weakness  SKIN: No itching, rashes  Psych: No anxiety or depression

## 2021-08-03 ENCOUNTER — TRANSCRIPTION ENCOUNTER (OUTPATIENT)
Age: 71
End: 2021-08-03

## 2021-08-03 VITALS — HEART RATE: 71 BPM | SYSTOLIC BLOOD PRESSURE: 157 MMHG | DIASTOLIC BLOOD PRESSURE: 83 MMHG

## 2021-08-03 LAB
ALBUMIN SERPL ELPH-MCNC: 3.4 G/DL — SIGNIFICANT CHANGE UP (ref 3.3–5)
ALP SERPL-CCNC: 75 U/L — SIGNIFICANT CHANGE UP (ref 40–120)
ALT FLD-CCNC: 42 U/L — SIGNIFICANT CHANGE UP (ref 12–78)
ANION GAP SERPL CALC-SCNC: 7 MMOL/L — SIGNIFICANT CHANGE UP (ref 5–17)
AST SERPL-CCNC: 21 U/L — SIGNIFICANT CHANGE UP (ref 15–37)
BILIRUB SERPL-MCNC: 0.7 MG/DL — SIGNIFICANT CHANGE UP (ref 0.2–1.2)
BUN SERPL-MCNC: 15 MG/DL — SIGNIFICANT CHANGE UP (ref 7–23)
CALCIUM SERPL-MCNC: 9 MG/DL — SIGNIFICANT CHANGE UP (ref 8.5–10.1)
CHLORIDE SERPL-SCNC: 110 MMOL/L — HIGH (ref 96–108)
CO2 SERPL-SCNC: 25 MMOL/L — SIGNIFICANT CHANGE UP (ref 22–31)
COVID-19 SPIKE DOMAIN AB INTERP: POSITIVE
COVID-19 SPIKE DOMAIN ANTIBODY RESULT: >250 U/ML — HIGH
CREAT SERPL-MCNC: 0.57 MG/DL — SIGNIFICANT CHANGE UP (ref 0.5–1.3)
GLUCOSE SERPL-MCNC: 112 MG/DL — HIGH (ref 70–99)
HCT VFR BLD CALC: 48.2 % — SIGNIFICANT CHANGE UP (ref 39–50)
HGB BLD-MCNC: 15 G/DL — SIGNIFICANT CHANGE UP (ref 13–17)
MCHC RBC-ENTMCNC: 26 PG — LOW (ref 27–34)
MCHC RBC-ENTMCNC: 31.1 GM/DL — LOW (ref 32–36)
MCV RBC AUTO: 83.7 FL — SIGNIFICANT CHANGE UP (ref 80–100)
NRBC # BLD: 0 /100 WBCS — SIGNIFICANT CHANGE UP (ref 0–0)
PLATELET # BLD AUTO: 395 K/UL — SIGNIFICANT CHANGE UP (ref 150–400)
POTASSIUM SERPL-MCNC: 4.1 MMOL/L — SIGNIFICANT CHANGE UP (ref 3.5–5.3)
POTASSIUM SERPL-SCNC: 4.1 MMOL/L — SIGNIFICANT CHANGE UP (ref 3.5–5.3)
PROT SERPL-MCNC: 7.1 G/DL — SIGNIFICANT CHANGE UP (ref 6–8.3)
RBC # BLD: 5.76 M/UL — SIGNIFICANT CHANGE UP (ref 4.2–5.8)
RBC # FLD: 14 % — SIGNIFICANT CHANGE UP (ref 10.3–14.5)
SARS-COV-2 IGG+IGM SERPL QL IA: >250 U/ML — HIGH
SARS-COV-2 IGG+IGM SERPL QL IA: POSITIVE
SODIUM SERPL-SCNC: 142 MMOL/L — SIGNIFICANT CHANGE UP (ref 135–145)
WBC # BLD: 9.36 K/UL — SIGNIFICANT CHANGE UP (ref 3.8–10.5)
WBC # FLD AUTO: 9.36 K/UL — SIGNIFICANT CHANGE UP (ref 3.8–10.5)

## 2021-08-03 PROCEDURE — 85610 PROTHROMBIN TIME: CPT

## 2021-08-03 PROCEDURE — U0003: CPT

## 2021-08-03 PROCEDURE — 97162 PT EVAL MOD COMPLEX 30 MIN: CPT

## 2021-08-03 PROCEDURE — 94640 AIRWAY INHALATION TREATMENT: CPT

## 2021-08-03 PROCEDURE — 86769 SARS-COV-2 COVID-19 ANTIBODY: CPT

## 2021-08-03 PROCEDURE — 70551 MRI BRAIN STEM W/O DYE: CPT | Mod: 26

## 2021-08-03 PROCEDURE — 80053 COMPREHEN METABOLIC PANEL: CPT

## 2021-08-03 PROCEDURE — 85730 THROMBOPLASTIN TIME PARTIAL: CPT

## 2021-08-03 PROCEDURE — 36415 COLL VENOUS BLD VENIPUNCTURE: CPT

## 2021-08-03 PROCEDURE — 72125 CT NECK SPINE W/O DYE: CPT

## 2021-08-03 PROCEDURE — 99239 HOSP IP/OBS DSCHRG MGMT >30: CPT | Mod: GC

## 2021-08-03 PROCEDURE — 85027 COMPLETE CBC AUTOMATED: CPT

## 2021-08-03 PROCEDURE — 97165 OT EVAL LOW COMPLEX 30 MIN: CPT

## 2021-08-03 PROCEDURE — G0378: CPT

## 2021-08-03 PROCEDURE — 99285 EMERGENCY DEPT VISIT HI MDM: CPT

## 2021-08-03 PROCEDURE — U0005: CPT

## 2021-08-03 PROCEDURE — 70551 MRI BRAIN STEM W/O DYE: CPT

## 2021-08-03 PROCEDURE — 93005 ELECTROCARDIOGRAM TRACING: CPT

## 2021-08-03 PROCEDURE — 70450 CT HEAD/BRAIN W/O DYE: CPT

## 2021-08-03 PROCEDURE — 85025 COMPLETE CBC W/AUTO DIFF WBC: CPT

## 2021-08-03 PROCEDURE — 71045 X-RAY EXAM CHEST 1 VIEW: CPT

## 2021-08-03 RX ORDER — TIOTROPIUM BROMIDE 18 UG/1
1 CAPSULE ORAL; RESPIRATORY (INHALATION)
Qty: 0 | Refills: 0 | DISCHARGE
Start: 2021-08-03

## 2021-08-03 RX ORDER — ALBUTEROL 90 UG/1
2 AEROSOL, METERED ORAL
Qty: 0 | Refills: 0 | DISCHARGE

## 2021-08-03 RX ORDER — TIOTROPIUM BROMIDE 18 UG/1
1 CAPSULE ORAL; RESPIRATORY (INHALATION)
Qty: 30 | Refills: 0
Start: 2021-08-03

## 2021-08-03 RX ORDER — NICOTINE POLACRILEX 2 MG
1 GUM BUCCAL
Qty: 14 | Refills: 0
Start: 2021-08-03

## 2021-08-03 RX ORDER — ALBUTEROL 90 UG/1
2 AEROSOL, METERED ORAL
Qty: 0 | Refills: 0 | DISCHARGE
Start: 2021-08-03

## 2021-08-03 RX ORDER — NICOTINE POLACRILEX 2 MG
1 GUM BUCCAL
Qty: 0 | Refills: 0 | DISCHARGE
Start: 2021-08-03

## 2021-08-03 RX ADMIN — ATORVASTATIN CALCIUM 80 MILLIGRAM(S): 80 TABLET, FILM COATED ORAL at 00:04

## 2021-08-03 RX ADMIN — CLOPIDOGREL BISULFATE 75 MILLIGRAM(S): 75 TABLET, FILM COATED ORAL at 13:18

## 2021-08-03 RX ADMIN — ENOXAPARIN SODIUM 40 MILLIGRAM(S): 100 INJECTION SUBCUTANEOUS at 13:14

## 2021-08-03 RX ADMIN — TIOTROPIUM BROMIDE 1 CAPSULE(S): 18 CAPSULE ORAL; RESPIRATORY (INHALATION) at 13:15

## 2021-08-03 RX ADMIN — Medication 81 MILLIGRAM(S): at 13:18

## 2021-08-03 NOTE — PHYSICAL THERAPY INITIAL EVALUATION ADULT - PERTINENT HX OF CURRENT PROBLEM, REHAB EVAL
72 yo M presents to ER s/p fall. Pt was at PT's office when he fell due to weakness in his L leg. Pt denies loss of consciousness, urinary/stool incontinence, h/a, dizziness, numbness/tingling, n/v. Admitted w/ stroke 2 months ago. CT head: right periventricular basilar infarct which could be recent and/or subacute, no intracerebral hemorrhage, contusion, or extracerebral collections are identified. CT C-spine: No acute fracture identified.

## 2021-08-03 NOTE — OCCUPATIONAL THERAPY INITIAL EVALUATION ADULT - LEVEL OF INDEPENDENCE: BED TO CHAIR, REHAB EVAL
pt amb to/from bathroom independent with cane, supervision for safety and vc's for pacing with longer distances/independent

## 2021-08-03 NOTE — PROGRESS NOTE ADULT - ATTENDING COMMENTS
Pt. seen and evaluated for left LE weakness.  Pt. is in no distress.  Weakness of left LE improved.  Physical examination and plan as above.  Awaiting MRI head.  PT evaluation recommending outpatient PT.

## 2021-08-03 NOTE — PROGRESS NOTE ADULT - PROBLEM SELECTOR PLAN 1
Patient presented s/p fall at PT session, likely mechanical during PT session 2/2 to residual L leg weakness from prior stroke 2 months ago  - CT head significant for right periventricular basilar infarct which could be recent and/or subacute. 2)  no intracerebral hemorrhage, contusion, or extracerebral collections are identified.  - Given abnormal CT head findings, MRI ordered to r/o subacute infarct   - Fall precautions, bed alarm, and chair alarm ordered   - OOB with assistance   - Ambulate with assistance  - F/u MRI results  - PT and OT consulted, pt to be dc'd home  - Social work consulted; f/u recs

## 2021-08-03 NOTE — OCCUPATIONAL THERAPY INITIAL EVALUATION ADULT - ADDITIONAL COMMENTS
Pt lives with his family in a private home, 1 step to enter then stays on 1 level. Bathroom has a tub. Pt was recently at [facility] s/p CVA ~2 months ago. Pt reports since he was d/c from rehab he has been independent with ADLs and functional mobility with cane. He was receiving outpatient PT.

## 2021-08-03 NOTE — OCCUPATIONAL THERAPY INITIAL EVALUATION ADULT - PERTINENT HX OF CURRENT PROBLEM, REHAB EVAL
72 yo M pmhx prediabetes, former smoker (quit 2 months ago, using nicotine patch), COPD, HTN, CVA in May 2021 who presents to the ER s/p fall. Patient was at his physical therapist's office this morning when he fell spontaneously and felt weakness in his L leg afterwards. CT head: right periventricular basilar infarct which could be recent and/or subacute.

## 2021-08-03 NOTE — PROGRESS NOTE ADULT - SUBJECTIVE AND OBJECTIVE BOX
Neurology Follow up note    FUK [id number]    HPI:  72 yo M pmhx prediabetes, former smoker (quit 2 months ago, using nicotine patch), COPD, HTN, CVA in May 2021 who presents to the ER s/p fall. Patient was at his physical therapist's office this morning when he fell spontaneously and felt weakness in his L leg afterwards. He was brought to [facility] by ambulance. Patient denies loss of consciousness, urinary/stool incontinence, h/a, dizziness, numbness/tingling, n/v. At present, his leg weakness has partially resolved from this morning, but feels weaker than it did yesterday. Patient was admitted to [facility] for stroke 2 months ago and experienced L leg weakness at that time.     In the ED:   VS: T: 97.6, HR: 73, BP: 184/64, RR: 16, SpO2: 96% on room air   Labs were within normal limits   CT head: 1)  right periventricular basilar infarct which could be recent and/or subacute. Follow-up MR imaging recommended..2)  no intracerebral hemorrhage, contusion, or extracerebral collections are identified.  CT Cervical spine: No acute fracture identified. 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 (02 Aug 2021 14:07)      Interval History -no dizziness/ha    Patient is seen, chart was reviewed and case was discussed with the treatment team.  Pt is not in any distress.   Lying on bed comfortably.   No events reported overnight.       Vital Signs Last 24 Hrs  T(C): 36.7 (03 Aug 2021 12:28), Max: 37 (02 Aug 2021 17:26)  T(F): 98.1 (03 Aug 2021 12:28), Max: 98.6 (02 Aug 2021 17:26)  HR: 66 (03 Aug 2021 12:28) (60 - 80)  BP: 142/85 (03 Aug 2021 12:28) (137/76 - 164/74)  BP(mean): --  RR: 17 (03 Aug 2021 12:28) (16 - 17)  SpO2: 96% (03 Aug 2021 12:28) (94% - 98%)        REVIEW OF SYSTEMS:    Constitutional: No fever,   Eyes: No eye pain, visual disturbances, or discharge  ENT:  No difficulty hearing, tinnitus, vertigo; No sinus or throat pain  Neck: No pain or stiffness  Respiratory: No cough, wheezing, chills or hemoptysis  Cardiovascular: No chest pain, palpitations, shortness of breath, dizziness or leg swelling  Gastrointestinal: No abdominal or epigastric pain. No nausea, vomiting or hematemesis;  Genitourinary: No dysuria, frequency, hematuria or incontinence  Neurological: No headaches, m  Psychiatric: No depression, anxiety, mood swings or difficulty sleeping  Musculoskeletal: No joint pain or swelling;  Skin: No itching, burning, rashes or lesions   Lymph Nodes: No enlarged glands  Endocrine: No heat or cold intolerance; No hair loss  Allergy and Immunologic: No hives or eczema    On Neurological Examination:    Mental Status - Pt is alert, awake, oriented X3. Follows commands well and able to answer questions appropriately.Mood and affect  normal    Speech -  Normal    Cranial Nerves - Pupils 3 mm equal and reactive to light, extraocular eye movements intact. Pt has no visual field deficit.  Pt has no facial asymmetry. Facial sensation is intact.Tongue - is in midline.    Muscle tone - is normal       Motor Exam - mild old left hemiparesis    Sensory Exam - Pin prick, temperature, joint position and vibration are intact on either side. Pt withdraws all extremities equally on stimulation. No asymmetry seen. No complaints of tingling, numbness.    Gait - left hemiataxia    Hemiataxia right/left.    coordination:    Finger to nose: normal    Deep tendon Reflexes - 2 plus all over.       Neck Supple -  Yes.     MEDICATIONS    acetaminophen   Tablet .. 650 milliGRAM(s) Oral every 6 hours PRN  ALBUTerol    90 MICROgram(s) HFA Inhaler 2 Puff(s) Inhalation every 6 hours PRN  aspirin enteric coated 81 milliGRAM(s) Oral daily  atorvastatin 80 milliGRAM(s) Oral at bedtime  clopidogrel Tablet 75 milliGRAM(s) Oral daily  enoxaparin Injectable 40 milliGRAM(s) SubCutaneous daily  melatonin 3 milliGRAM(s) Oral at bedtime PRN  nicotine -   7 mG/24Hr(s) Patch 1 patch Transdermal daily  tiotropium 18 MICROgram(s) Capsule 1 Capsule(s) Inhalation daily      Allergies    eggs (Pruritus)  Milk (Pruritus)  No Known Drug Allergies    Intolerances        LABS:  CBC Full  -  ( 03 Aug 2021 05:27 )  WBC Count : 9.36 K/uL  RBC Count : 5.76 M/uL  Hemoglobin : 15.0 g/dL  Hematocrit : 48.2 %  Platelet Count - Automated : 395 K/uL          08-03    142  |  110<H>  |  15  ----------------------------<  112<H>  4.1   |  25  |  0.57    Ca    9.0      03 Aug 2021 05:27    TPro  7.1  /  Alb  3.4  /  TBili  0.7  /  DBili  x   /  AST  21  /  ALT  42  /  AlkPhos  75  08-03    Hemoglobin A1C:     Vitamin B12     RADIOLOGY  `< from: MR Head No Cont (08.03.21 @ 15:42) >    EXAM:  MR BRAIN                            PROCEDURE DATE:  08/03/2021          INTERPRETATION:  CLINICAL STATEMENT: Recent CVA.. Fall    TECHNIQUE: MRI of the brain was performed without gadolinium.    COMPARISON: MRI brain 5/26/2021. CT head 8/2/2021    FINDINGS:  There is mild diffuse parenchymal volume loss. There are T2 prolongation signal abnormalities in the periventricular subcortical white matter likely related to mild chronic microvascular ischemic changes.    Evolving infarct right corona radiata mostly chronic in age with small subcentimeter subacute component present.    There is no acute parenchymal hemorrhage, parenchymal mass, or midline shift. There is no extra-axial fluid collection.  There is no hydrocephalus.    Mucosal thickening paranasal sinuses    IMPRESSION:  Evolving right corona radiata infarct mostly chronic in age with small subcentimeter subacute component.    No acute intracranial hemorr            GREGORY LOWERY MD; Attending Radiologist  This document has been electronically signed. Aug  3 2021  4:08PM      ASSESSMENT AND PLAN:      presented with fall  subacute on chronic right corona radiata infarct        continue asa/plavix  on high dose of statin                     Physical therapy evaluation  Advanced care planning was discussed with family.  Pain is accessed and addressed.  Would continue to follow.              
Patient is a 71y old  Male who presents with a chief complaint of fall, r/o repeat CVA (03 Aug 2021 09:01)      INTERVAL HPI/OVERNIGHT EVENTS: Patient seen and examined at bedside. No overnight events occurred. Patient has no complaints at this time. Denies any focal weaknesses. States left leg weakness has resolved. Denies fevers, chills, headache, lightheadedness, chest pain, dyspnea, abdominal pain, n/v/d/c.    MEDICATIONS  (STANDING):  aspirin enteric coated 81 milliGRAM(s) Oral daily  atorvastatin 80 milliGRAM(s) Oral at bedtime  clopidogrel Tablet 75 milliGRAM(s) Oral daily  enoxaparin Injectable 40 milliGRAM(s) SubCutaneous daily  nicotine -   7 mG/24Hr(s) Patch 1 patch Transdermal daily  tiotropium 18 MICROgram(s) Capsule 1 Capsule(s) Inhalation daily    MEDICATIONS  (PRN):  acetaminophen   Tablet .. 650 milliGRAM(s) Oral every 6 hours PRN Temp greater or equal to 38.5C (101.3F), Mild Pain (1 - 3)  ALBUTerol    90 MICROgram(s) HFA Inhaler 2 Puff(s) Inhalation every 6 hours PRN Shortness of Breath and/or Wheezing  melatonin 3 milliGRAM(s) Oral at bedtime PRN Insomnia      Allergies    eggs (Pruritus)  Milk (Pruritus)  No Known Drug Allergies    Intolerances        REVIEW OF SYSTEMS:  CONSTITUTIONAL: No fever or chills  HEENT:  No headache, no sore throat  RESPIRATORY: No cough, wheezing, or shortness of breath  CARDIOVASCULAR: No chest pain, palpitations  GASTROINTESTINAL: No abd pain, nausea, vomiting, or diarrhea  GENITOURINARY: No dysuria, frequency, or hematuria  NEUROLOGICAL: no focal weakness or dizziness  MUSCULOSKELETAL: no myalgias     Vital Signs Last 24 Hrs  T(C): 36.7 (03 Aug 2021 12:28), Max: 37 (02 Aug 2021 17:26)  T(F): 98.1 (03 Aug 2021 12:28), Max: 98.6 (02 Aug 2021 17:26)  HR: 66 (03 Aug 2021 12:28) (60 - 80)  BP: 142/85 (03 Aug 2021 12:28) (137/76 - 164/74)  BP(mean): --  RR: 17 (03 Aug 2021 12:28) (16 - 17)  SpO2: 96% (03 Aug 2021 12:28) (94% - 98%)    PHYSICAL EXAM:  GENERAL: NAD  NEURO: AA&Ox3, CNII-XII intact. 5/5 strength of upper and lower extremities. No focal weaknesses. Sensation intact in extremities and face, symmetrically. No dysdiadochokinesia.  HEENT:  anicteric, moist mucous membranes  CHEST/LUNG:  CTA b/l, no rales, wheezes, or rhonchi  HEART:  RRR, S1, S2  ABDOMEN:  BS+, soft, nontender, nondistended  EXTREMITIES: no edema, cyanosis, or calf tenderness  NERVOUS SYSTEM: answers questions and follows commands appropriately    LABS:                        15.0   9.36  )-----------( 395      ( 03 Aug 2021 05:27 )             48.2     CBC Full  -  ( 03 Aug 2021 05:27 )  WBC Count : 9.36 K/uL  Hemoglobin : 15.0 g/dL  Hematocrit : 48.2 %  Platelet Count - Automated : 395 K/uL  Mean Cell Volume : 83.7 fl  Mean Cell Hemoglobin : 26.0 pg  Mean Cell Hemoglobin Concentration : 31.1 gm/dL  Auto Neutrophil # : x  Auto Lymphocyte # : x  Auto Monocyte # : x  Auto Eosinophil # : x  Auto Basophil # : x  Auto Neutrophil % : x  Auto Lymphocyte % : x  Auto Monocyte % : x  Auto Eosinophil % : x  Auto Basophil % : x    03 Aug 2021 05:27    142    |  110    |  15     ----------------------------<  112    4.1     |  25     |  0.57     Ca    9.0        03 Aug 2021 05:27    TPro  7.1    /  Alb  3.4    /  TBili  0.7    /  DBili  x      /  AST  21     /  ALT  42     /  AlkPhos  75     03 Aug 2021 05:27    PT/INR - ( 02 Aug 2021 14:37 )   PT: 10.6 sec;   INR: 0.90 ratio         PTT - ( 02 Aug 2021 14:37 )  PTT:35.7 sec    CAPILLARY BLOOD GLUCOSE              RADIOLOGY & ADDITIONAL TESTS:    Personally reviewed.     Consultant(s) Notes Reviewed:  [x] YES  [ ] NO

## 2021-08-03 NOTE — DISCHARGE NOTE PROVIDER - NSDCCPCAREPLAN_GEN_ALL_CORE_FT
PRINCIPAL DISCHARGE DIAGNOSIS  Diagnosis: Fall, initial encounter  Assessment and Plan of Treatment: Secondary to residual weakness of left lower extremity.  Improved during the hospitalization.  MRI head showed evelving right corona radiata infarct mostly chronic in age with small subcentimeter subacute component.  Please continue to take aspirin, plavix, and cholesterol lowering medication.  Resume outpatient physical therapy.      SECONDARY DISCHARGE DIAGNOSES  Diagnosis: Benign essential HTN  Assessment and Plan of Treatment: Continue your antihypertensive medication.    Diagnosis: Nicotine dependence  Assessment and Plan of Treatment: Continue using nicotine patch and abstain from smoking.

## 2021-08-03 NOTE — ED ADULT NURSE REASSESSMENT NOTE - NSIMPLEMENTINTERV_GEN_ALL_ED
Implemented All Universal Safety Interventions:  Alice to call system. Call bell, personal items and telephone within reach. Instruct patient to call for assistance. Room bathroom lighting operational. Non-slip footwear when patient is off stretcher. Physically safe environment: no spills, clutter or unnecessary equipment. Stretcher in lowest position, wheels locked, appropriate side rails in place.

## 2021-08-03 NOTE — PROGRESS NOTE ADULT - PROBLEM SELECTOR PLAN 2
CT head s/p fall shows no acute hemorrhage, contusion, or collections but shows right periventricular basilar infarct which could be recent and/or subacute  - Dr Ly made aware and reviewed scans, MRI ordered to r/o subacute stroke   - Will continue patient on ASA, Plavix (until 8/25), and statin.   - Neuro (Dr. Ly) consulted; f/u recommendations  - Allow permissive Hypertension  -passed bedside dysphagia screen by RN

## 2021-08-03 NOTE — DISCHARGE NOTE NURSING/CASE MANAGEMENT/SOCIAL WORK - PATIENT PORTAL LINK FT
You can access the FollowMyHealth Patient Portal offered by [facility] by registering at the following website: [email or web address]. By joining [employer]’s FollowMyHealth portal, you will also be able to view your health information using other applications (apps) compatible with our system.

## 2021-08-03 NOTE — DISCHARGE NOTE PROVIDER - HOSPITAL COURSE
FROM ADMISSION HPI: 72 yo M pmhx prediabetes, former smoker (quit 2 months ago, using nicotine patch), COPD, HTN, CVA in May 2021 who presents to the ER s/p fall. Patient was at his physical therapist's office this morning when he fell spontaneously and felt weakness in his L leg afterwards. He was brought [facility]. Patient denies loss of consciousness, urinary/stool incontinence, h/a, dizziness, numbness/tingling, n/v. At present, his leg weakness has partially resolved from this morning, but feels weaker than it did yesterday. Patient was admitted to [facility] for stroke 2 months ago and experienced L leg weakness at that time.     In the ED:   VS: T: 97.6, HR: 73, BP: 184/64, RR: 16, SpO2: 96% on room air   Labs were within normal limits   CT head: 1)  right periventricular basilar infarct which could be recent and/or subacute. Follow-up MR imaging recommended..2)  no intracerebral hemorrhage, contusion, or extracerebral collections are identified.  CT Cervical spine: No acute fracture identified. 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     ---  HOSPITAL COURSE: Patient was admitted for       Patient is stable for discharge as per primary medical team and consultants.    PT consulted, recommends discharge ______    Patient showed improvement throughout hospitalization. Patient was seen and examined on day of discharge:    VITALS:       PHYSICAL EXAM:    Patient was medically optimized for discharge with close outpatient follow up.    ---  CONSULTANTS:     ---  TIME SPENT:  I, the attending physician, was physically present for the key portions of the evaluation and management (E/M) service provided. The total amount of time spent reviewing the hospital notes, laboratory values, imaging findings, assessing/counseling the patient, discussing with consultant physicians, social work, nursing staff was -- minutes    ---  FINAL DISCHARGE DIAGNOSIS LIST:  Please see last daily progress note for final discharge diagnoses    ---  Primary care provider was made aware of plan for discharge:      [  ] NO     [ x ] YES   FROM ADMISSION HPI: 70 yo M pmhx prediabetes, former smoker (quit 2 months ago, using nicotine patch), COPD, HTN, CVA in May 2021 who presents to the ER s/p fall. Patient was at his physical therapist's office this morning when he fell spontaneously and felt weakness in his L leg afterwards. He was brought to [facility] by ambulance. Patient denies loss of consciousness, urinary/stool incontinence, h/a, dizziness, numbness/tingling, n/v. At present, his leg weakness has partially resolved from this morning, but feels weaker than it did yesterday. Patient was admitted to [facility] for stroke 2 months ago and experienced L leg weakness at that time.     In the ED:   VS: T: 97.6, HR: 73, BP: 184/64, RR: 16, SpO2: 96% on room air   Labs were within normal limits   CT head: 1)  right periventricular basilar infarct which could be recent and/or subacute. Follow-up MR imaging recommended..2)  no intracerebral hemorrhage, contusion, or extracerebral collections are identified.  CT Cervical spine: No acute fracture identified. 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     ---  HOSPITAL COURSE: Patient was admitted for left lower extremity weakness.  He had neurology consultation.  Pt. was continued on aspirin, plavix, and statin medications.  He had an MRI of the head which showed evolving right corona radiata infarct mostly chronic in age with small subcentimeter subacute component.  Pt.'s left lower extremity weakness resolved.  Physical therapy evaluated patient and recommended to resume outpatient physical therapy.    Patient is stable for discharge as per primary medical team and consultants.    Patient showed improvement throughout hospitalization. Patient was seen and examined on day of discharge.  Afebrile and hemodynamically stable.     ---  CONSULTANTS:  Neurology Aliyah    ---  TIME SPENT:  I, the attending physician, was physically present for the key portions of the evaluation and management (E/M) service provided. The total amount of time spent reviewing the hospital notes, laboratory values, imaging findings, assessing/counseling the patient, discussing with consultant physicians, social work, nursing staff was 33 minutes    ---  FINAL DISCHARGE DIAGNOSIS LIST:  Please see last daily progress note for final discharge diagnoses    ---

## 2021-08-03 NOTE — DISCHARGE NOTE PROVIDER - NSDCMRMEDTOKEN_GEN_ALL_CORE_FT
Albuterol (Eqv-Proventil HFA) 90 mcg/inh inhalation aerosol: 2 puff(s) inhaled 2 times a day    *Discontinued Stiolto  amLODIPine 2.5 mg oral tablet: 1 tab(s) orally once a day  aspirin 81 mg oral delayed release tablet: 1 tab(s) orally once a day  atorvastatin 80 mg oral tablet: 1 tab(s) orally once a day (at bedtime)  clopidogrel 75 mg oral tablet: 1 tab(s) orally once a day  Vitamin D3: 1 tab(s) orally once a day   albuterol 90 mcg/inh inhalation aerosol: 2 puff(s) inhaled every 6 hours, As needed, Shortness of Breath and/or Wheezing  amLODIPine 2.5 mg oral tablet: 1 tab(s) orally once a day  aspirin 81 mg oral delayed release tablet: 1 tab(s) orally once a day  atorvastatin 80 mg oral tablet: 1 tab(s) orally once a day (at bedtime)  clopidogrel 75 mg oral tablet: 1 tab(s) orally once a day  nicotine 7 mg/24 hr transdermal film, extended release: 1 patch transdermal once a day  tiotropium 18 mcg inhalation capsule: 1 cap(s) inhaled once a day  Vitamin D3: 1 tab(s) orally once a day

## 2021-08-03 NOTE — ED ADULT NURSE REASSESSMENT NOTE - NS ED NURSE REASSESS COMMENT FT1
Pt received in bed alert and oriented and resting in bed. Pt denies any pain or discomfort as of this time. Pt admitted and very report given to JOHN Barbosa.

## 2021-08-03 NOTE — PROGRESS NOTE ADULT - PROBLEM SELECTOR PLAN 4
Chronic, stable   - Continue albuterol   - Continue tiotroprium Chronic, stable   - Continue albuterol PRN  - Continue tiotroprium

## 2021-08-03 NOTE — DISCHARGE NOTE PROVIDER - CARE PROVIDER_API CALL
Juan Ly  NEUROLOGY  [address] [place] [address]  Phone: [phone]  Fax: [phone]  Follow Up Time: 2 weeks

## 2021-08-05 ENCOUNTER — NON-APPOINTMENT (OUTPATIENT)
Age: 71
End: 2021-08-05

## 2021-08-05 ENCOUNTER — APPOINTMENT (OUTPATIENT)
Dept: PHYSICAL MEDICINE AND REHAB | Facility: CLINIC | Age: 71
End: 2021-08-05
Payer: MEDICARE

## 2021-08-05 VITALS
HEART RATE: 73 BPM | TEMPERATURE: 98.4 F | HEIGHT: 64 IN | OXYGEN SATURATION: 97 % | SYSTOLIC BLOOD PRESSURE: 130 MMHG | DIASTOLIC BLOOD PRESSURE: 76 MMHG | BODY MASS INDEX: 21.51 KG/M2 | WEIGHT: 126 LBS

## 2021-08-05 DIAGNOSIS — G81.94 HEMIPLEGIA, UNSPECIFIED AFFECTING LEFT NONDOMINANT SIDE: ICD-10-CM

## 2021-08-05 PROBLEM — Z87.891 PERSONAL HISTORY OF NICOTINE DEPENDENCE: Chronic | Status: ACTIVE | Noted: 2021-08-02

## 2021-08-05 PROBLEM — I63.9 CEREBRAL INFARCTION, UNSPECIFIED: Chronic | Status: ACTIVE | Noted: 2021-08-02

## 2021-08-05 PROCEDURE — 99213 OFFICE O/P EST LOW 20 MIN: CPT

## 2021-08-07 NOTE — HISTORY OF PRESENT ILLNESS
[FreeTextEntry1] : [facility] admission 6/3 to 6/17/21.  Presents today with son\par \par 71M LH-dominant with PMHx of prediabetes, COPD ( 1/2ppd smoker), who presented to  05/25/2021 with left-sided weakness. He experienced a similar episode several years ago when he was evaluated for a stroke and was informed he had imbalance in his ears and given medication for 3 days with resolution of symptoms. CT head and CT angio head/neck on admission negative for acute pathology. MR brain with findings c/w acute right lacunar infarct. He was started on ASA/Plavix. He failed his initial dysphagia screen, \par but passed MBS on 5/27, cleared for soft with nectar-thick fluids. \par \par Hospital course was otherwise unremarkable. Patient admitted  to [facility] on 06/03/2021. Uncomplicated hospital course.  Patient tolerated course of inpatient PT/OT/SLP rehab with significant functional improvements and met rehab goals prior to discharge. Patient was medically cleared on 06/17/2021 for discharged to home. \par \par Discharge Level of Function: \par - OT:  supervision-setup with eating, grooming, bathing and toileting;  Melissa shower, mod I dressing, \par - PT: Supervision- transfers, ambulation 150 ft w/ RW, 12 steps \par - Speech-- regular diet--compensatory strategies after swallow, independent comprehension and expression, has good insight; Supervision PS and Memory. close to baseline. \par \par Attending Outpatient PT and OT-- Notes reviewed-- working on ambulation and balance and coordination.  Attending Twice weekly-- almost 1 month. \par \par Pt. was evaluated for Speech therapy, but was not continued as speech therapy notes there would be limitation as pt's primary language is Cantonese. Son notes pt. has some delayed processing speed but improving. \par \par Lives [place], and [place] in [place]-- [place], does use stairs inside.  Independent PTA\par \par he is ambulating independently  without Assistive device and with quad cane and outside home. Able to negotiate stairs to get into home.\par he is independent in ADLs --starting to be less dependent on shower chair.  \par \par \par Hospitalized in [place] 8/2-8/3 for Left LE weakness which resolved.  Had fall at PT.  CT and MRI head showed no new acute findings. W/u negative.  \par

## 2021-08-07 NOTE — ASSESSMENT
[FreeTextEntry1] : Making good recovery--cont. PT and OT\par \par Rx's renewed.  \par \par Pt. asking about driving--clearance not provided at this time-- will reassess next visit once cooridination improves. \par \par All questions answered.\par

## 2021-08-07 NOTE — PHYSICAL EXAM
[FreeTextEntry1] : Constitutional: Alert, awake, no acute distress\par HEENT: EOMI, NC/AT, neck supple\par Cardiovascular: All extremities warm and well perfused\par Pulmonary: No respiratory distress, normal chest wall expansion\par Gastrointestinal: No abdominal distention\par \par Neuro:\par AAOx3, \par Recall 3/3 spontaneously. \par Attention--able to complete Dof WB, and serial 7's\par  \par \par CN: intact no nystagmus, visual fields intact, PERRLA, EOMI, no facial weakness or sensory deficit, shoulder johann intact, tongue midline\par Motor 5/5 bilat UE and LE, except left hip and left . \par Sens intact bilat UE and LE\par Coordination intact--FNF slightly impaired on left and HTS intact\par Proprioception: intact\par \par gait--ambulated without cane-- decreased left foot clearance due to hip flexion weakness.  magnetic on left\par

## 2021-08-07 NOTE — HISTORY OF PRESENT ILLNESS
[FreeTextEntry1] : [facility] admission 6/3 to 6/17/21.  Presents today with son\par \par 71M LH-dominant with PMHx of prediabetes, COPD ( 1/2ppd smoker), who presented to [facility] 05/25/2021 with left-sided weakness. He experienced a similar episode several years ago when he was evaluated for a stroke and was informed he had imbalance in his ears and given medication for 3 days with resolution of symptoms. CT head and CT angio head/neck on admission negative for acute pathology. MR brain with findings c/w acute right lacunar infarct. He was started on ASA/Plavix. He failed his initial dysphagia screen, \par but passed MBS on 5/27, cleared for soft with nectar-thick fluids. \par \par Hospital course was otherwise unremarkable. Patient admitted  to [facility] on 06/03/2021. Uncomplicated hospital course.  Patient tolerated course of inpatient PT/OT/SLP rehab with significant functional improvements and met rehab goals prior to discharge. Patient was medically cleared on 06/17/2021 for discharged to home. \par \par Discharge Level of Function: \par - OT:  supervision-setup with eating, grooming, bathing and toileting;  Melissa shower, mod I dressing, \par - PT: Supervision- transfers, ambulation 150 ft w/ RW, 12 steps \par - Speech-- regular diet--compensatory strategies after swallow, independent comprehension and expression, has good insight; Supervision PS and Memory. close to baseline. \par \par Attending Outpatient PT and OT-- Notes reviewed-- working on ambulation and balance and coordination.  Attending Twice weekly-- almost 1 month. \par \par Pt. was evaluated for Speech therapy, but was not continued as speech therapy notes there would be limitation as pt's primary language is Cantonese. Son notes pt. has some delayed processing speed but improving. \par \par [place], [place] in [place]-- [place], does use stairs inside.  Independent PTA\par \par he is ambulating independently  without Assistive device and with quad cane and outside home. Able to negotiate stairs to get into home.\par he is independent in ADLs --starting to be less dependent on shower chair.  \par \par \par Hospitalized in [place] 8/2-8/3 for Left LE weakness which resolved.  Had fall at PT.  CT and MRI head showed no new acute findings. W/u negative.  \par

## 2021-08-07 NOTE — REASON FOR VISIT
[Follow-Up] : a follow-up visit [Family Member] : family member [FreeTextEntry1] : right corona radiata infarct

## 2021-09-07 NOTE — DISCHARGE NOTE NURSING/CASE MANAGEMENT/SOCIAL WORK - NSDCPEPTSTRK_GEN_ALL_CORE
Pt had postpartum depression and a \"cystocele\".  She can have note to return to work with lifting restrictions of 20#s.  She needs to f/u with pelvic floor PT.    Call 911 for stroke/Need for follow up after discharge/Prescribed medications/Risk factors for stroke/Stroke education booklet/Stroke support groups for patients, families, and friends/Stroke warning signs and symptoms/Signs and symptoms of stroke

## 2021-09-21 ENCOUNTER — APPOINTMENT (OUTPATIENT)
Dept: PHYSICAL MEDICINE AND REHAB | Facility: CLINIC | Age: 71
End: 2021-09-21
Payer: MEDICARE

## 2021-09-21 VITALS
SYSTOLIC BLOOD PRESSURE: 154 MMHG | TEMPERATURE: 97.9 F | HEART RATE: 70 BPM | RESPIRATION RATE: 16 BRPM | BODY MASS INDEX: 21.51 KG/M2 | OXYGEN SATURATION: 97 % | DIASTOLIC BLOOD PRESSURE: 74 MMHG | HEIGHT: 64 IN | WEIGHT: 126 LBS

## 2021-09-21 DIAGNOSIS — R26.9 UNSPECIFIED ABNORMALITIES OF GAIT AND MOBILITY: ICD-10-CM

## 2021-09-21 DIAGNOSIS — Z82.3 FAMILY HISTORY OF STROKE: ICD-10-CM

## 2021-09-21 DIAGNOSIS — Z82.49 FAMILY HISTORY OF ISCHEMIC HEART DISEASE AND OTHER DISEASES OF THE CIRCULATORY SYSTEM: ICD-10-CM

## 2021-09-21 DIAGNOSIS — I63.9 CEREBRAL INFARCTION, UNSPECIFIED: ICD-10-CM

## 2021-09-21 DIAGNOSIS — Z87.891 PERSONAL HISTORY OF NICOTINE DEPENDENCE: ICD-10-CM

## 2021-09-21 DIAGNOSIS — R27.8 OTHER LACK OF COORDINATION: ICD-10-CM

## 2021-09-21 PROCEDURE — 99213 OFFICE O/P EST LOW 20 MIN: CPT

## 2021-09-25 PROBLEM — R26.9 GAIT ABNORMALITY: Status: ACTIVE | Noted: 2021-08-05

## 2021-09-25 PROBLEM — R27.8 DYSMETRIA: Status: ACTIVE | Noted: 2021-08-05

## 2021-09-26 NOTE — PHYSICAL EXAM
[FreeTextEntry1] : Constitutional: Alert, awake, no acute distress\par HEENT: EOMI, NC/AT, neck supple\par Cardiovascular: All extremities warm and well perfused\par Pulmonary: No respiratory distress, normal chest wall expansion\par Gastrointestinal: No abdominal distention\par \par Neuro:\par AAOx3, \par Recall 3/3 spontaneously. \par Attention--able to complete Dof WB, and serial 7's\par  \par \par CN: intact no nystagmus, visual fields intact, PERRLA, EOMI, no facial weakness or sensory deficit, shoulder johann intact, tongue midline\par Motor 5/5 bilat UE and LE, except left hip and left . \par Sens intact bilat UE and LE\par Coordination intact--FNF  and HTS intact\par Proprioception: intact\par \par gait--ambulated without cane--reciprocal gait pattern-- good foot clearance.  \par balance-- mildly impaired tandem gait\par

## 2021-09-26 NOTE — ASSESSMENT
[FreeTextEntry1] : Making great recovery from stroke.  close to meeting outpatient therapy goals.   Continue current course of PT and OT until completion.  \par \par Cont. HEP\par \par Referred for behind the wheel driving assessment\par \par All questions answered.\par

## 2021-09-26 NOTE — REASON FOR VISIT
[Family Member] : family member [Follow-Up] : a follow-up visit [FreeTextEntry1] : right lacunar infarct

## 2021-09-26 NOTE — HISTORY OF PRESENT ILLNESS
[FreeTextEntry1] : [facility] admission 6/3 to 6/17/21. Presents today with son \par \par 71M LH-dominant with PMHx of prediabetes, COPD ( 1/2ppd smoker), who presented [facility] 05/25/2021 with left-sided weakness. He experienced a similar episode several years ago when he was evaluated for a stroke and was informed he had imbalance in his ears and given medication for 3 days with resolution of symptoms. CT head and CT angio head/neck on admission negative for acute pathology. MR brain with findings c/w acute right lacunar infarct.\par \par Last visit 8/5/21\par \par Attending PT and OT at [facility] twice weekly-\par PT & OT notes reviewed.  \par \par he is ambulating independently without Assistive device and with quad cane and outside home. Able to negotiate stairs to get into home.\par he is independent in ADLs --uses  shower chair.   Doing some light chores at home. \par \par States he is 70-80% himself.  Happy with his progress.  Strength and coordination is good\par Pt. asking if he can return to driving.  Has done a little bit of driving on small roads with CS of his son-- Son states he did pretty well.  \par no falls. \par cognitively at his baseline so did not need speech therapy.  \par \par Pt's son with no concerns.\par

## 2024-02-07 NOTE — SWALLOW BEDSIDE ASSESSMENT ADULT - SWALLOW EVAL: BUCCAL STRENGTH AND MOBILITY
Medicine list updated and clarified    Meloxicam 15 mg daily with food    Prednisone 20 mg daily for 4 days    Tramadol 50 mg at bed and every 8 hours as needed    Get labs done ordered by Dr. Driscoll    Inflammatory markers and uric acid    Make appointment to see PCP 1 week after orthopedic March 7 appointment    MRI reviewed   intact

## 2024-03-18 ENCOUNTER — NON-APPOINTMENT (OUTPATIENT)
Age: 74
End: 2024-03-18

## 2024-10-28 NOTE — DISCHARGE NOTE NURSING/CASE MANAGEMENT/SOCIAL WORK - NSDPACMPNY_GEN_ALL_CORE
Caller: Sheryl Bryan    Relationship: Self    Best call back number: [phone]    What is the best time to reach you: ANY    What was the call regarding: PATIENT JUST SAW SHE HAD A MISSED CALL FROM THIS OFFICE LAST WEEK, NOTHING NOTED IN THE CHART UNSURE WHO CALLED; PLEASE CALL BACK.     Is it okay if the provider responds through MyChart: NO     Family
